# Patient Record
Sex: FEMALE | Race: WHITE | NOT HISPANIC OR LATINO | Employment: UNEMPLOYED | URBAN - METROPOLITAN AREA
[De-identification: names, ages, dates, MRNs, and addresses within clinical notes are randomized per-mention and may not be internally consistent; named-entity substitution may affect disease eponyms.]

---

## 2017-01-24 ENCOUNTER — OFFICE VISIT (OUTPATIENT)
Dept: ORTHOPEDIC SURGERY | Facility: CLINIC | Age: 42
End: 2017-01-24

## 2017-01-24 VITALS — HEIGHT: 64 IN | BODY MASS INDEX: 19.46 KG/M2 | WEIGHT: 114 LBS | TEMPERATURE: 97.6 F

## 2017-01-24 DIAGNOSIS — Z98.1 HISTORY OF LUMBAR FUSION: ICD-10-CM

## 2017-01-24 DIAGNOSIS — Z09 SURGERY FOLLOW-UP EXAMINATION: Primary | ICD-10-CM

## 2017-01-24 PROCEDURE — 99213 OFFICE O/P EST LOW 20 MIN: CPT | Performed by: ORTHOPAEDIC SURGERY

## 2017-01-24 PROCEDURE — 72100 X-RAY EXAM L-S SPINE 2/3 VWS: CPT | Performed by: ORTHOPAEDIC SURGERY

## 2017-01-24 NOTE — PROGRESS NOTES
She is 4 months out and has only occasional discomfort.  The pain is markedly improved from preoperatively.  No leg pain whatsoever no balance difficulties bowel or bladder complaints.  On exam she has good strength in lower extremities in all tested groups.  Straight leg raise is negative.  Plain film x-rays 2 views of lumbar spine demonstrate what would appear to be a solid posterolateral fusion which has shown further remodeling compared to prior films.  Well I will see her back needed

## 2017-07-21 ENCOUNTER — ANESTHESIA (OUTPATIENT)
Dept: PERIOP | Facility: HOSPITAL | Age: 42
End: 2017-07-21

## 2017-07-21 ENCOUNTER — HOSPITAL ENCOUNTER (OUTPATIENT)
Facility: HOSPITAL | Age: 42
Setting detail: OBSERVATION
Discharge: HOME | End: 2017-07-22
Attending: EMERGENCY MEDICINE | Admitting: SURGERY

## 2017-07-21 ENCOUNTER — ANESTHESIA EVENT (OUTPATIENT)
Dept: PERIOP | Facility: HOSPITAL | Age: 42
End: 2017-07-21

## 2017-07-21 ENCOUNTER — APPOINTMENT (OUTPATIENT)
Dept: CT IMAGING | Facility: HOSPITAL | Age: 42
End: 2017-07-21

## 2017-07-21 DIAGNOSIS — K35.30 ACUTE APPENDICITIS WITH LOCALIZED PERITONITIS: Primary | ICD-10-CM

## 2017-07-21 LAB
ALBUMIN SERPL-MCNC: 4.5 G/DL (ref 3.5–5.2)
ALBUMIN/GLOB SERPL: 1.4 G/DL
ALP SERPL-CCNC: 57 U/L (ref 39–117)
ALT SERPL W P-5'-P-CCNC: 11 U/L (ref 1–33)
ANION GAP SERPL CALCULATED.3IONS-SCNC: 18.7 MMOL/L
AST SERPL-CCNC: 10 U/L (ref 1–32)
BACTERIA UR QL AUTO: NORMAL /HPF
BASOPHILS # BLD AUTO: 0.02 10*3/MM3 (ref 0–0.2)
BASOPHILS NFR BLD AUTO: 0.2 % (ref 0–1.5)
BILIRUB SERPL-MCNC: 1 MG/DL (ref 0.1–1.2)
BILIRUB UR QL STRIP: NEGATIVE
BUN BLD-MCNC: 10 MG/DL (ref 6–20)
BUN/CREAT SERPL: 15.2 (ref 7–25)
CALCIUM SPEC-SCNC: 9 MG/DL (ref 8.6–10.5)
CHLORIDE SERPL-SCNC: 98 MMOL/L (ref 98–107)
CLARITY UR: CLEAR
CO2 SERPL-SCNC: 21.3 MMOL/L (ref 22–29)
COLOR UR: YELLOW
CREAT BLD-MCNC: 0.66 MG/DL (ref 0.57–1)
DEPRECATED RDW RBC AUTO: 46 FL (ref 37–54)
EOSINOPHIL # BLD AUTO: 0.02 10*3/MM3 (ref 0–0.7)
EOSINOPHIL NFR BLD AUTO: 0.2 % (ref 0.3–6.2)
ERYTHROCYTE [DISTWIDTH] IN BLOOD BY AUTOMATED COUNT: 12.8 % (ref 11.7–13)
GFR SERPL CREATININE-BSD FRML MDRD: 99 ML/MIN/1.73
GLOBULIN UR ELPH-MCNC: 3.2 GM/DL
GLUCOSE BLD-MCNC: 101 MG/DL (ref 65–99)
GLUCOSE UR STRIP-MCNC: NEGATIVE MG/DL
HCG SERPL QL: NEGATIVE
HCT VFR BLD AUTO: 45.2 % (ref 35.6–45.5)
HGB BLD-MCNC: 14.7 G/DL (ref 11.9–15.5)
HGB UR QL STRIP.AUTO: NEGATIVE
HYALINE CASTS UR QL AUTO: NORMAL /LPF
IMM GRANULOCYTES # BLD: 0.02 10*3/MM3 (ref 0–0.03)
IMM GRANULOCYTES NFR BLD: 0.2 % (ref 0–0.5)
KETONES UR QL STRIP: ABNORMAL
LEUKOCYTE ESTERASE UR QL STRIP.AUTO: ABNORMAL
LIPASE SERPL-CCNC: 28 U/L (ref 13–60)
LYMPHOCYTES # BLD AUTO: 1.5 10*3/MM3 (ref 0.9–4.8)
LYMPHOCYTES NFR BLD AUTO: 12.5 % (ref 19.6–45.3)
MCH RBC QN AUTO: 32 PG (ref 26.9–32)
MCHC RBC AUTO-ENTMCNC: 32.5 G/DL (ref 32.4–36.3)
MCV RBC AUTO: 98.5 FL (ref 80.5–98.2)
MONOCYTES # BLD AUTO: 0.88 10*3/MM3 (ref 0.2–1.2)
MONOCYTES NFR BLD AUTO: 7.3 % (ref 5–12)
NEUTROPHILS # BLD AUTO: 9.6 10*3/MM3 (ref 1.9–8.1)
NEUTROPHILS NFR BLD AUTO: 79.6 % (ref 42.7–76)
NITRITE UR QL STRIP: NEGATIVE
PH UR STRIP.AUTO: 8.5 [PH] (ref 5–8)
PLATELET # BLD AUTO: 191 10*3/MM3 (ref 140–500)
PMV BLD AUTO: 11.6 FL (ref 6–12)
POTASSIUM BLD-SCNC: 4.1 MMOL/L (ref 3.5–5.2)
PROT SERPL-MCNC: 7.7 G/DL (ref 6–8.5)
PROT UR QL STRIP: NEGATIVE
RBC # BLD AUTO: 4.59 10*6/MM3 (ref 3.9–5.2)
RBC # UR: NORMAL /HPF
REF LAB TEST METHOD: NORMAL
SODIUM BLD-SCNC: 138 MMOL/L (ref 136–145)
SP GR UR STRIP: 1.01 (ref 1–1.03)
SQUAMOUS #/AREA URNS HPF: NORMAL /HPF
UROBILINOGEN UR QL STRIP: ABNORMAL
WBC NRBC COR # BLD: 12.04 10*3/MM3 (ref 4.5–10.7)
WBC UR QL AUTO: NORMAL /HPF

## 2017-07-21 PROCEDURE — 74177 CT ABD & PELVIS W/CONTRAST: CPT

## 2017-07-21 PROCEDURE — 83690 ASSAY OF LIPASE: CPT | Performed by: EMERGENCY MEDICINE

## 2017-07-21 PROCEDURE — 25010000002 KETOROLAC TROMETHAMINE PER 15 MG: Performed by: ANESTHESIOLOGY

## 2017-07-21 PROCEDURE — G0378 HOSPITAL OBSERVATION PER HR: HCPCS

## 2017-07-21 PROCEDURE — 96365 THER/PROPH/DIAG IV INF INIT: CPT

## 2017-07-21 PROCEDURE — 84703 CHORIONIC GONADOTROPIN ASSAY: CPT | Performed by: EMERGENCY MEDICINE

## 2017-07-21 PROCEDURE — 0 IOPAMIDOL 61 % SOLUTION: Performed by: EMERGENCY MEDICINE

## 2017-07-21 PROCEDURE — 25010000002 ONDANSETRON PER 1 MG: Performed by: EMERGENCY MEDICINE

## 2017-07-21 PROCEDURE — 99283 EMERGENCY DEPT VISIT LOW MDM: CPT

## 2017-07-21 PROCEDURE — 81001 URINALYSIS AUTO W/SCOPE: CPT | Performed by: EMERGENCY MEDICINE

## 2017-07-21 PROCEDURE — 96375 TX/PRO/DX INJ NEW DRUG ADDON: CPT

## 2017-07-21 PROCEDURE — 85025 COMPLETE CBC W/AUTO DIFF WBC: CPT | Performed by: EMERGENCY MEDICINE

## 2017-07-21 PROCEDURE — 25010000002 ONDANSETRON PER 1 MG: Performed by: ANESTHESIOLOGY

## 2017-07-21 PROCEDURE — 25010000002 PROPOFOL 10 MG/ML EMULSION: Performed by: ANESTHESIOLOGY

## 2017-07-21 PROCEDURE — 99219 PR INITIAL OBSERVATION CARE/DAY 50 MINUTES: CPT | Performed by: SURGERY

## 2017-07-21 PROCEDURE — 25010000002 MIDAZOLAM PER 1 MG: Performed by: ANESTHESIOLOGY

## 2017-07-21 PROCEDURE — 80053 COMPREHEN METABOLIC PANEL: CPT | Performed by: EMERGENCY MEDICINE

## 2017-07-21 PROCEDURE — 25010000002 HYDROMORPHONE PER 4 MG: Performed by: EMERGENCY MEDICINE

## 2017-07-21 PROCEDURE — 25010000002 PROMETHAZINE PER 50 MG: Performed by: ANESTHESIOLOGY

## 2017-07-21 PROCEDURE — 88304 TISSUE EXAM BY PATHOLOGIST: CPT | Performed by: SURGERY

## 2017-07-21 PROCEDURE — 25010000002 FENTANYL CITRATE (PF) 100 MCG/2ML SOLUTION: Performed by: ANESTHESIOLOGY

## 2017-07-21 PROCEDURE — 25010000002 PIPERACILLIN SOD-TAZOBACTAM PER 1 G: Performed by: SURGERY

## 2017-07-21 PROCEDURE — 25010000002 DEXAMETHASONE PER 1 MG: Performed by: ANESTHESIOLOGY

## 2017-07-21 PROCEDURE — 44970 LAPAROSCOPY APPENDECTOMY: CPT | Performed by: PHYSICIAN ASSISTANT

## 2017-07-21 PROCEDURE — 44970 LAPAROSCOPY APPENDECTOMY: CPT | Performed by: SURGERY

## 2017-07-21 PROCEDURE — 25010000002 PIPERACILLIN SOD-TAZOBACTAM PER 1 G: Performed by: EMERGENCY MEDICINE

## 2017-07-21 RX ORDER — HYDROMORPHONE HYDROCHLORIDE 1 MG/ML
0.5 INJECTION, SOLUTION INTRAMUSCULAR; INTRAVENOUS; SUBCUTANEOUS
Status: DISCONTINUED | OUTPATIENT
Start: 2017-07-21 | End: 2017-07-21

## 2017-07-21 RX ORDER — VIT C/B6/B5/MAGNESIUM/HERB 173 50-5-6-5MG
1 CAPSULE ORAL DAILY
COMMUNITY
End: 2022-07-22

## 2017-07-21 RX ORDER — DIPHENHYDRAMINE HYDROCHLORIDE 50 MG/ML
6.25 INJECTION INTRAMUSCULAR; INTRAVENOUS
Status: DISCONTINUED | OUTPATIENT
Start: 2017-07-21 | End: 2017-07-21

## 2017-07-21 RX ORDER — PROMETHAZINE HYDROCHLORIDE 25 MG/1
25 SUPPOSITORY RECTAL ONCE AS NEEDED
Status: DISCONTINUED | OUTPATIENT
Start: 2017-07-21 | End: 2017-07-21

## 2017-07-21 RX ORDER — BUPIVACAINE HYDROCHLORIDE AND EPINEPHRINE 5; 5 MG/ML; UG/ML
INJECTION, SOLUTION EPIDURAL; INTRACAUDAL; PERINEURAL AS NEEDED
Status: DISCONTINUED | OUTPATIENT
Start: 2017-07-21 | End: 2017-07-21 | Stop reason: HOSPADM

## 2017-07-21 RX ORDER — PROMETHAZINE HYDROCHLORIDE 25 MG/1
25 TABLET ORAL ONCE AS NEEDED
Status: DISCONTINUED | OUTPATIENT
Start: 2017-07-21 | End: 2017-07-21

## 2017-07-21 RX ORDER — FENTANYL CITRATE 50 UG/ML
100 INJECTION, SOLUTION INTRAMUSCULAR; INTRAVENOUS
Status: DISCONTINUED | OUTPATIENT
Start: 2017-07-21 | End: 2017-07-21 | Stop reason: HOSPADM

## 2017-07-21 RX ORDER — NALOXONE HCL 0.4 MG/ML
0.4 VIAL (ML) INJECTION AS NEEDED
Status: DISCONTINUED | OUTPATIENT
Start: 2017-07-21 | End: 2017-07-21

## 2017-07-21 RX ORDER — HYDROMORPHONE HYDROCHLORIDE 1 MG/ML
0.5 INJECTION, SOLUTION INTRAMUSCULAR; INTRAVENOUS; SUBCUTANEOUS ONCE
Status: COMPLETED | OUTPATIENT
Start: 2017-07-21 | End: 2017-07-21

## 2017-07-21 RX ORDER — MIDAZOLAM HYDROCHLORIDE 1 MG/ML
1 INJECTION INTRAMUSCULAR; INTRAVENOUS
Status: DISCONTINUED | OUTPATIENT
Start: 2017-07-21 | End: 2017-07-21 | Stop reason: HOSPADM

## 2017-07-21 RX ORDER — MIDAZOLAM HYDROCHLORIDE 1 MG/ML
2 INJECTION INTRAMUSCULAR; INTRAVENOUS
Status: DISCONTINUED | OUTPATIENT
Start: 2017-07-21 | End: 2017-07-21 | Stop reason: HOSPADM

## 2017-07-21 RX ORDER — SODIUM CHLORIDE 0.9 % (FLUSH) 0.9 %
1-10 SYRINGE (ML) INJECTION AS NEEDED
Status: DISCONTINUED | OUTPATIENT
Start: 2017-07-21 | End: 2017-07-21 | Stop reason: HOSPADM

## 2017-07-21 RX ORDER — ACETAMINOPHEN 500 MG
500 TABLET ORAL EVERY 6 HOURS PRN
Status: DISCONTINUED | OUTPATIENT
Start: 2017-07-21 | End: 2017-07-22 | Stop reason: HOSPADM

## 2017-07-21 RX ORDER — FENTANYL CITRATE 50 UG/ML
25 INJECTION, SOLUTION INTRAMUSCULAR; INTRAVENOUS
Status: DISCONTINUED | OUTPATIENT
Start: 2017-07-21 | End: 2017-07-21

## 2017-07-21 RX ORDER — ROCURONIUM BROMIDE 10 MG/ML
INJECTION, SOLUTION INTRAVENOUS AS NEEDED
Status: DISCONTINUED | OUTPATIENT
Start: 2017-07-21 | End: 2017-07-21 | Stop reason: SURG

## 2017-07-21 RX ORDER — SODIUM CHLORIDE, SODIUM LACTATE, POTASSIUM CHLORIDE, CALCIUM CHLORIDE 600; 310; 30; 20 MG/100ML; MG/100ML; MG/100ML; MG/100ML
9 INJECTION, SOLUTION INTRAVENOUS CONTINUOUS
Status: DISCONTINUED | OUTPATIENT
Start: 2017-07-21 | End: 2017-07-21

## 2017-07-21 RX ORDER — NALOXONE HCL 0.4 MG/ML
0.4 VIAL (ML) INJECTION
Status: DISCONTINUED | OUTPATIENT
Start: 2017-07-21 | End: 2017-07-22

## 2017-07-21 RX ORDER — ONDANSETRON 2 MG/ML
INJECTION INTRAMUSCULAR; INTRAVENOUS AS NEEDED
Status: DISCONTINUED | OUTPATIENT
Start: 2017-07-21 | End: 2017-07-21 | Stop reason: SURG

## 2017-07-21 RX ORDER — ONDANSETRON 2 MG/ML
4 INJECTION INTRAMUSCULAR; INTRAVENOUS ONCE
Status: COMPLETED | OUTPATIENT
Start: 2017-07-21 | End: 2017-07-21

## 2017-07-21 RX ORDER — LEVOTHYROXINE SODIUM 0.1 MG/1
100 TABLET ORAL DAILY
Status: DISCONTINUED | OUTPATIENT
Start: 2017-07-21 | End: 2017-07-22 | Stop reason: HOSPADM

## 2017-07-21 RX ORDER — PROMETHAZINE HYDROCHLORIDE 12.5 MG/1
12.5 TABLET ORAL ONCE AS NEEDED
Status: DISCONTINUED | OUTPATIENT
Start: 2017-07-21 | End: 2017-07-21

## 2017-07-21 RX ORDER — DEXTROSE, SODIUM CHLORIDE, AND POTASSIUM CHLORIDE 5; .45; .15 G/100ML; G/100ML; G/100ML
50 INJECTION INTRAVENOUS CONTINUOUS
Status: DISCONTINUED | OUTPATIENT
Start: 2017-07-21 | End: 2017-07-22

## 2017-07-21 RX ORDER — PROMETHAZINE HYDROCHLORIDE 25 MG/ML
6.25 INJECTION, SOLUTION INTRAMUSCULAR; INTRAVENOUS ONCE AS NEEDED
Status: DISCONTINUED | OUTPATIENT
Start: 2017-07-21 | End: 2017-07-21

## 2017-07-21 RX ORDER — OXYCODONE HYDROCHLORIDE AND ACETAMINOPHEN 5; 325 MG/1; MG/1
1 TABLET ORAL ONCE AS NEEDED
Status: DISCONTINUED | OUTPATIENT
Start: 2017-07-21 | End: 2017-07-21

## 2017-07-21 RX ORDER — DEXAMETHASONE SODIUM PHOSPHATE 10 MG/ML
INJECTION INTRAMUSCULAR; INTRAVENOUS AS NEEDED
Status: DISCONTINUED | OUTPATIENT
Start: 2017-07-21 | End: 2017-07-21 | Stop reason: SURG

## 2017-07-21 RX ORDER — HYDRALAZINE HYDROCHLORIDE 20 MG/ML
5 INJECTION INTRAMUSCULAR; INTRAVENOUS
Status: DISCONTINUED | OUTPATIENT
Start: 2017-07-21 | End: 2017-07-21

## 2017-07-21 RX ORDER — LIDOCAINE HYDROCHLORIDE 10 MG/ML
0.5 INJECTION, SOLUTION EPIDURAL; INFILTRATION; INTRACAUDAL; PERINEURAL ONCE AS NEEDED
Status: DISCONTINUED | OUTPATIENT
Start: 2017-07-21 | End: 2017-07-21 | Stop reason: HOSPADM

## 2017-07-21 RX ORDER — LIDOCAINE HYDROCHLORIDE 20 MG/ML
INJECTION, SOLUTION INFILTRATION; PERINEURAL AS NEEDED
Status: DISCONTINUED | OUTPATIENT
Start: 2017-07-21 | End: 2017-07-21 | Stop reason: SURG

## 2017-07-21 RX ORDER — LABETALOL HYDROCHLORIDE 5 MG/ML
5 INJECTION, SOLUTION INTRAVENOUS
Status: DISCONTINUED | OUTPATIENT
Start: 2017-07-21 | End: 2017-07-21

## 2017-07-21 RX ORDER — MELATONIN
1000 DAILY
Status: DISCONTINUED | OUTPATIENT
Start: 2017-07-21 | End: 2017-07-22 | Stop reason: HOSPADM

## 2017-07-21 RX ORDER — HYDROMORPHONE HYDROCHLORIDE 1 MG/ML
0.5 INJECTION, SOLUTION INTRAMUSCULAR; INTRAVENOUS; SUBCUTANEOUS
Status: DISCONTINUED | OUTPATIENT
Start: 2017-07-21 | End: 2017-07-22

## 2017-07-21 RX ORDER — HYDROMORPHONE HYDROCHLORIDE 1 MG/ML
0.25 INJECTION, SOLUTION INTRAMUSCULAR; INTRAVENOUS; SUBCUTANEOUS
Status: DISCONTINUED | OUTPATIENT
Start: 2017-07-21 | End: 2017-07-21

## 2017-07-21 RX ORDER — PROMETHAZINE HYDROCHLORIDE 25 MG/ML
12.5 INJECTION, SOLUTION INTRAMUSCULAR; INTRAVENOUS ONCE AS NEEDED
Status: DISCONTINUED | OUTPATIENT
Start: 2017-07-21 | End: 2017-07-21

## 2017-07-21 RX ORDER — FAMOTIDINE 10 MG/ML
20 INJECTION, SOLUTION INTRAVENOUS ONCE
Status: COMPLETED | OUTPATIENT
Start: 2017-07-21 | End: 2017-07-21

## 2017-07-21 RX ORDER — OXYCODONE HYDROCHLORIDE AND ACETAMINOPHEN 5; 325 MG/1; MG/1
1 TABLET ORAL EVERY 4 HOURS PRN
Status: DISCONTINUED | OUTPATIENT
Start: 2017-07-21 | End: 2017-07-22 | Stop reason: HOSPADM

## 2017-07-21 RX ORDER — PROPOFOL 10 MG/ML
VIAL (ML) INTRAVENOUS AS NEEDED
Status: DISCONTINUED | OUTPATIENT
Start: 2017-07-21 | End: 2017-07-21 | Stop reason: SURG

## 2017-07-21 RX ORDER — CITALOPRAM 20 MG/1
20 TABLET ORAL EVERY MORNING
Status: DISCONTINUED | OUTPATIENT
Start: 2017-07-22 | End: 2017-07-22 | Stop reason: HOSPADM

## 2017-07-21 RX ORDER — PROMETHAZINE HYDROCHLORIDE 25 MG/ML
12.5 INJECTION, SOLUTION INTRAMUSCULAR; INTRAVENOUS EVERY 6 HOURS PRN
Status: DISCONTINUED | OUTPATIENT
Start: 2017-07-21 | End: 2017-07-22 | Stop reason: HOSPADM

## 2017-07-21 RX ORDER — PROMETHAZINE HYDROCHLORIDE 25 MG/ML
6.25 INJECTION, SOLUTION INTRAMUSCULAR; INTRAVENOUS ONCE
Status: COMPLETED | OUTPATIENT
Start: 2017-07-21 | End: 2017-07-21

## 2017-07-21 RX ORDER — EPHEDRINE SULFATE 50 MG/ML
5 INJECTION, SOLUTION INTRAVENOUS ONCE AS NEEDED
Status: DISCONTINUED | OUTPATIENT
Start: 2017-07-21 | End: 2017-07-21

## 2017-07-21 RX ORDER — CETIRIZINE HYDROCHLORIDE 10 MG/1
10 TABLET ORAL EVERY MORNING
Status: DISCONTINUED | OUTPATIENT
Start: 2017-07-22 | End: 2017-07-22 | Stop reason: HOSPADM

## 2017-07-21 RX ORDER — MAGNESIUM HYDROXIDE 1200 MG/15ML
LIQUID ORAL AS NEEDED
Status: DISCONTINUED | OUTPATIENT
Start: 2017-07-21 | End: 2017-07-21 | Stop reason: HOSPADM

## 2017-07-21 RX ORDER — ONDANSETRON 2 MG/ML
4 INJECTION INTRAMUSCULAR; INTRAVENOUS ONCE AS NEEDED
Status: DISCONTINUED | OUTPATIENT
Start: 2017-07-21 | End: 2017-07-21

## 2017-07-21 RX ORDER — ONDANSETRON 2 MG/ML
4 INJECTION INTRAMUSCULAR; INTRAVENOUS EVERY 6 HOURS PRN
Status: DISCONTINUED | OUTPATIENT
Start: 2017-07-21 | End: 2017-07-22

## 2017-07-21 RX ORDER — SCOLOPAMINE TRANSDERMAL SYSTEM 1 MG/1
1 PATCH, EXTENDED RELEASE TRANSDERMAL ONCE
Status: DISCONTINUED | OUTPATIENT
Start: 2017-07-21 | End: 2017-07-21

## 2017-07-21 RX ORDER — IBUPROFEN 200 MG
1250 CAPSULE ORAL DAILY
Status: DISCONTINUED | OUTPATIENT
Start: 2017-07-21 | End: 2017-07-22 | Stop reason: HOSPADM

## 2017-07-21 RX ORDER — FLUMAZENIL 0.1 MG/ML
0.2 INJECTION INTRAVENOUS AS NEEDED
Status: DISCONTINUED | OUTPATIENT
Start: 2017-07-21 | End: 2017-07-21

## 2017-07-21 RX ORDER — SODIUM CHLORIDE 0.9 % (FLUSH) 0.9 %
1-10 SYRINGE (ML) INJECTION AS NEEDED
Status: DISCONTINUED | OUTPATIENT
Start: 2017-07-21 | End: 2017-07-22 | Stop reason: HOSPADM

## 2017-07-21 RX ORDER — KETOROLAC TROMETHAMINE 30 MG/ML
INJECTION, SOLUTION INTRAMUSCULAR; INTRAVENOUS AS NEEDED
Status: DISCONTINUED | OUTPATIENT
Start: 2017-07-21 | End: 2017-07-21 | Stop reason: SURG

## 2017-07-21 RX ORDER — FENTANYL CITRATE 50 UG/ML
INJECTION, SOLUTION INTRAMUSCULAR; INTRAVENOUS AS NEEDED
Status: DISCONTINUED | OUTPATIENT
Start: 2017-07-21 | End: 2017-07-21 | Stop reason: SURG

## 2017-07-21 RX ADMIN — SCOPOLAMINE 1 PATCH: 1 PATCH, EXTENDED RELEASE TRANSDERMAL at 16:32

## 2017-07-21 RX ADMIN — SODIUM CHLORIDE 1000 ML: 9 INJECTION, SOLUTION INTRAVENOUS at 13:03

## 2017-07-21 RX ADMIN — ONDANSETRON 4 MG: 2 INJECTION INTRAMUSCULAR; INTRAVENOUS at 13:02

## 2017-07-21 RX ADMIN — HYDROMORPHONE HYDROCHLORIDE 0.5 MG: 1 INJECTION, SOLUTION INTRAMUSCULAR; INTRAVENOUS; SUBCUTANEOUS at 13:02

## 2017-07-21 RX ADMIN — ONDANSETRON 4 MG: 2 INJECTION INTRAMUSCULAR; INTRAVENOUS at 17:27

## 2017-07-21 RX ADMIN — PROPOFOL 140 MG: 10 INJECTION, EMULSION INTRAVENOUS at 16:43

## 2017-07-21 RX ADMIN — SODIUM CHLORIDE, POTASSIUM CHLORIDE, SODIUM LACTATE AND CALCIUM CHLORIDE 9 ML/HR: 600; 310; 30; 20 INJECTION, SOLUTION INTRAVENOUS at 16:31

## 2017-07-21 RX ADMIN — LIDOCAINE HYDROCHLORIDE 50 MG: 20 INJECTION, SOLUTION INFILTRATION; PERINEURAL at 16:43

## 2017-07-21 RX ADMIN — KETOROLAC TROMETHAMINE 30 MG: 30 INJECTION, SOLUTION INTRAMUSCULAR; INTRAVENOUS at 17:31

## 2017-07-21 RX ADMIN — FENTANYL CITRATE 25 MCG: 50 INJECTION, SOLUTION INTRAMUSCULAR; INTRAVENOUS at 17:30

## 2017-07-21 RX ADMIN — FENTANYL CITRATE 50 MCG: 50 INJECTION, SOLUTION INTRAMUSCULAR; INTRAVENOUS at 17:34

## 2017-07-21 RX ADMIN — ROCURONIUM BROMIDE 10 MG: 10 INJECTION INTRAVENOUS at 17:16

## 2017-07-21 RX ADMIN — ROCURONIUM BROMIDE 40 MG: 10 INJECTION INTRAVENOUS at 16:43

## 2017-07-21 RX ADMIN — IOPAMIDOL 85 ML: 612 INJECTION, SOLUTION INTRAVENOUS at 14:04

## 2017-07-21 RX ADMIN — TAZOBACTAM SODIUM AND PIPERACILLIN SODIUM 3.38 G: 375; 3 INJECTION, SOLUTION INTRAVENOUS at 21:36

## 2017-07-21 RX ADMIN — SODIUM CHLORIDE, POTASSIUM CHLORIDE, SODIUM LACTATE AND CALCIUM CHLORIDE 9 ML/HR: 600; 310; 30; 20 INJECTION, SOLUTION INTRAVENOUS at 18:12

## 2017-07-21 RX ADMIN — PROMETHAZINE HYDROCHLORIDE 6.25 MG: 25 INJECTION, SOLUTION INTRAMUSCULAR; INTRAVENOUS at 16:31

## 2017-07-21 RX ADMIN — TAZOBACTAM SODIUM AND PIPERACILLIN SODIUM 4.5 G: 500; 4 INJECTION, SOLUTION INTRAVENOUS at 14:51

## 2017-07-21 RX ADMIN — DEXAMETHASONE SODIUM PHOSPHATE 6 MG: 10 INJECTION INTRAMUSCULAR; INTRAVENOUS at 16:46

## 2017-07-21 RX ADMIN — SUGAMMADEX 500 MG: 100 INJECTION, SOLUTION INTRAVENOUS at 17:28

## 2017-07-21 RX ADMIN — FENTANYL CITRATE 50 MCG: 50 INJECTION, SOLUTION INTRAMUSCULAR; INTRAVENOUS at 17:26

## 2017-07-21 RX ADMIN — FENTANYL CITRATE 50 MCG: 50 INJECTION, SOLUTION INTRAMUSCULAR; INTRAVENOUS at 16:57

## 2017-07-21 RX ADMIN — FAMOTIDINE 20 MG: 10 INJECTION INTRAVENOUS at 16:31

## 2017-07-21 RX ADMIN — FENTANYL CITRATE 75 MCG: 50 INJECTION, SOLUTION INTRAMUSCULAR; INTRAVENOUS at 16:42

## 2017-07-21 RX ADMIN — MIDAZOLAM 1 MG: 1 INJECTION INTRAMUSCULAR; INTRAVENOUS at 16:32

## 2017-07-21 RX ADMIN — OXYCODONE HYDROCHLORIDE AND ACETAMINOPHEN 1 TABLET: 5; 325 TABLET ORAL at 21:38

## 2017-07-21 NOTE — PLAN OF CARE
Problem: Patient Care Overview (Adult)  Goal: Plan of Care Review  Outcome: Ongoing (interventions implemented as appropriate)    07/21/17 1610   Coping/Psychosocial Response Interventions   Plan Of Care Reviewed With patient   Patient Care Overview   Progress no change       Goal: Adult Individualization and Mutuality  Outcome: Ongoing (interventions implemented as appropriate)  Goal: Discharge Needs Assessment  Outcome: Ongoing (interventions implemented as appropriate)    Problem: Perioperative Period (Adult)  Goal: Signs and Symptoms of Listed Potential Problems Will be Absent or Manageable (Perioperative Period)  Outcome: Ongoing (interventions implemented as appropriate)

## 2017-07-21 NOTE — OP NOTE
Operative Note :  MD Yohannes Goinsede GUTHRIE Frederick  1975    Procedure Date: 07/21/17    Pre-op Diagnosis:  · Acute appendicitis    Post-op Diagnosis:  · Acute appendicitis with suppurative peritonitis    Procedure:   · Laparoscopic appendectomy    Surgeon: Leticia Elizabeth MD    Assistant: Charlie LYNCH    Anesthesia:  General (general endotracheal tube)    Estimated Blood Loss: Minimal    Specimens: Appendix    Complications: None    Indications:  · Acute appendicitis without perforation with surrounding suppurative peritonitis    Findings:   · Acutely inflamed appendix with multiple intraluminal fecaliths, purulent fluid surrounding the appendix within the pelvis with no other signs of perforation    Description of procedure:  The patient was brought to the operating room and placed on the OR table in supine position.  An endotracheal tube was inserted, and general anesthesia was induced.  A Martin catheter was placed into the urinary bladder using sterile technique.  Her left arm was tucked at her side and her abdomen was then prepped and draped in a sterile fashion.  A surgical timeout was completed.  A curvilinear infraumbilical skin incision was made after instillation of 0.5% Marcaine with epinephrine.  The umbilical stalk was grasped and elevated, allowing a longitudinal fasciotomy to be made.  A finger sweep was performed within the peritoneal cavity, ensuring that there were no underlying adherent loops of bowel.  A 10 mm Linton trocar was then inserted using 0 Vicryl stay sutures on either side of the fascial defect.  The abdomen was then insufflated.  The right lower quadrant was inspected and there was purulent fluid surrounding a grossly inflamed appendix stretching towards the lower pelvis.  Under direct visualization, 2 additional 5 mm trochars were then placed in the suprapubic and left lower quadrant spaces.  The appendix was grasped and a small window was created within the  mesentery at the appendiceal base.  The base of the appendix was then transected at the confluence of the tinea along the cecum using a single blue load of the laparoscopic stapler.  The appendix was then peeled off of the surrounding peritoneal lining using blunt dissection and electrocautery until the appendiceal mesentery was isolated.  This was transected using a single white load of the laparoscopic stapler.  The appendix was then removed from the peritoneal cavity within an Endo Catch bag and passed off to pathology in formalin.  The pelvis was then inspected and the purulent fluid mixed with some new blood was suctioned dry.  The right upper quadrant and perihepatic space was also inspected and some purulent fluid pooling here was suctioned dry.  The 2 staple lines were inspected and found to be hemostatic within the right lower quadrant.  The small bowel was bluntly swept away from the pelvis, ensuring that there was no inflammatory adhesion forming as a result of the appendicitis within the pelvic cavity.  The uterus was retroflexed anteriorly and the remaining purulent fluid within the perirectal space was suctioned dry.  The abdomen was then desufflated and all trochars removed under direct vision and the fascia was closed at the umbilicus using the previously placed 0 Vicryl stay sutures and all skin incisions were closed using 4-0 Vicryl subcuticular stitches.  Dermabond was dressed over each incision.  The Martin catheter was then removed and the patient extubated.  She was transferred to PACU in stable condition.  All counts were correct per nursing.    Leticia Elizabeth MD  General and Endoscopic Surgery  [facility]    [address], [address]  [place], [place], [address]  P: [phone]  F: [phone]

## 2017-07-21 NOTE — ANESTHESIA PROCEDURE NOTES
Airway  Urgency: elective    Airway not difficult    General Information and Staff    Patient location during procedure: OR  Anesthesiologist: CARMEN NGUYEN    Indications and Patient Condition  Indications for airway management: airway protection    Preoxygenated: yes  Mask difficulty assessment: 1 - vent by mask    Final Airway Details  Final airway type: endotracheal airway      Successful airway: ETT  Cuffed: yes   Successful intubation technique: direct laryngoscopy  Endotracheal tube insertion site: oral  Blade: Evon  Blade size: #3  ETT size: 7.0 mm  Cormack-Lehane Classification: grade I - full view of glottis  Placement verified by: chest auscultation and capnometry   Measured from: lips  ETT to lips (cm): 22  Number of attempts at approach: 1

## 2017-07-21 NOTE — ED PROVIDER NOTES
" EMERGENCY DEPARTMENT ENCOUNTER    CHIEF COMPLAINT  Chief Complaint: abdominal pain  History given by: pt  History limited by: nothing  Room Number: 22/22  PMD: Lisa Felix       HPI:  Pt is a 42 y.o. female who presents complaining of worsening generalized abdominal pain since yesterday morning. She reports her next period is set to start soon, but the symptoms are atypical. Pt admits to nausea and tingling in the legs but denies any urinary symptoms, vomiting, or diarrhea. She states she went to urgent care and was sent to the ER from there.      Duration:  One day  Onset: gradual  Timing: constant  Location: abdomen  Radiation: none  Quality: \"Pain\"  Intensity/Severity: moderate  Progression: worsening  Associated Symptoms: nausea, tingling in the legs  Aggravating Factors: unknown  Alleviating Factors: unknown  Previous Episodes: No  Treatment before arrival: unknown    PAST MEDICAL HISTORY  Active Ambulatory Problems     Diagnosis Date Noted   • Surgery follow-up examination 10/26/2016     Resolved Ambulatory Problems     Diagnosis Date Noted   • Lumbar radiculopathy 08/16/2016   • Lumbar canal stenosis 08/16/2016   • Spondylolisthesis of lumbar region 08/16/2016     Past Medical History:   Diagnosis Date   • Anxiety    • Asthma    • Back pain    • Disease of thyroid gland    • Low blood pressure    • Migraine    • Pituitary adenoma    • PONV (postoperative nausea and vomiting)    • Spinal stenosis        PAST SURGICAL HISTORY  Past Surgical History:   Procedure Laterality Date   • ABDOMINAL SURGERY      abdominal plasty   • BREAST AUGMENTATION     • DENTAL EXAMINATION UNDER ANESTHESIA     • LUMBAR DISCECTOMY FUSION INSTRUMENTATION N/A 9/26/2016    Procedure: L4-5  fusion with instrumentation;  Surgeon: Isidro Dietz MD;  Location: [facility];  Service:    • LUMBAR LAMINECTOMY DISCECTOMY DECOMPRESSION N/A 9/26/2016    Procedure: L4-L5 LUMBAR LAMINECTOMY;  Surgeon: Darrell Olvera MD;  Location: " [facility] MAIN OR;  Service:    • SCAR REVISION BREAST Bilateral 4/29/2016    Procedure: REVISION GABBY BREAST, REMOVE AND REPLACE RIGHT BREAST IMPLANT;  Surgeon: Isidro Nguyen MD;  Location: [facility] MAIN OR;  Service:    • WISDOM TOOTH EXTRACTION         FAMILY HISTORY  Family History   Problem Relation Age of Onset   • Bipolar disorder Other    • Breast cancer Other    • Heart disease Other    • Diabetes Other    • Hypothyroidism Other        SOCIAL HISTORY  Social History     Social History   • Marital status:      Spouse name: N/A   • Number of children: N/A   • Years of education: Bachelors     Occupational History   • Homemaker      Social History Main Topics   • Smoking status: Never Smoker   • Smokeless tobacco: Never Used   • Alcohol use 4.2 oz/week     7 Glasses of wine per week      Comment: occasional   • Drug use: No   • Sexual activity: Defer     Other Topics Concern   • Not on file     Social History Narrative       ALLERGIES  Review of patient's allergies indicates no known allergies.    REVIEW OF SYSTEMS  Review of Systems   Constitutional: Negative for fever.   HENT: Negative for sore throat.    Eyes: Negative.    Respiratory: Negative for cough and shortness of breath.    Cardiovascular: Negative for chest pain.   Gastrointestinal: Positive for abdominal pain (generalized) and nausea. Negative for diarrhea and vomiting.   Genitourinary: Negative for dysuria.   Musculoskeletal: Negative for neck pain.   Skin: Negative for rash.   Allergic/Immunologic: Negative.    Neurological: Positive for numbness (tingling in legs). Negative for weakness and headaches.   Hematological: Negative.    Psychiatric/Behavioral: Negative.    All other systems reviewed and are negative.      PHYSICAL EXAM  ED Triage Vitals   Temp Heart Rate Resp BP SpO2   07/21/17 1152 07/21/17 1152 07/21/17 1152 07/21/17 1157 07/21/17 1152   99.6 °F (37.6 °C) 75 16 115/66 97 %      Temp src Heart Rate Source Patient Position BP  Location FiO2 (%)   07/21/17 1152 07/21/17 1152 -- -- --   Tympanic Monitor          Physical Exam   Constitutional: She is oriented to person, place, and time and well-developed, well-nourished, and in no distress. No distress.   HENT:   Head: Normocephalic and atraumatic.   Eyes: EOM are normal. Pupils are equal, round, and reactive to light.   Neck: Normal range of motion. Neck supple.   Cardiovascular: Normal rate, regular rhythm and normal heart sounds.    Pulmonary/Chest: Effort normal and breath sounds normal. No respiratory distress.   Abdominal: Soft. There is tenderness (moderate to severe) in the right lower quadrant and left lower quadrant. There is no rebound and no guarding.   Left sided tenderness reproduces right sided pain   Musculoskeletal: Normal range of motion. She exhibits no edema.   Neurological: She is alert and oriented to person, place, and time. She has normal sensation and normal strength.   Skin: Skin is warm and dry. No rash noted.   Psychiatric: Mood and affect normal.   Nursing note and vitals reviewed.      LAB RESULTS  Lab Results (last 24 hours)     Procedure Component Value Units Date/Time    CBC & Differential [[id number]] Collected:  07/21/17 1301    Specimen:  Blood Updated:  07/21/17 1735    Narrative:       The following orders were created for panel order CBC & Differential.  Procedure                               Abnormality         Status                     ---------                               -----------         ------                     CBC Auto Differential[[id number]]        Abnormal            Final result                 Please view results for these tests on the individual orders.    Comprehensive Metabolic Panel [[id number]]  (Abnormal) Collected:  07/21/17 1301    Specimen:  Blood Updated:  07/21/17 1343     Glucose 101 (H) mg/dL      BUN 10 mg/dL      Creatinine 0.66 mg/dL      Sodium 138 mmol/L      Potassium 4.1 mmol/L      Chloride 98 mmol/L      CO2 21.3  (L) mmol/L      Calcium 9.0 mg/dL      Total Protein 7.7 g/dL      Albumin 4.50 g/dL      ALT (SGPT) 11 U/L      AST (SGOT) 10 U/L      Alkaline Phosphatase 57 U/L      Total Bilirubin 1.0 mg/dL      eGFR Non African Amer 99 mL/min/1.73      Globulin 3.2 gm/dL      A/G Ratio 1.4 g/dL      BUN/Creatinine Ratio 15.2     Anion Gap 18.7 mmol/L     hCG, Serum, Qualitative [[id number]]  (Normal) Collected:  07/21/17 1301    Specimen:  Blood Updated:  07/21/17 1345     HCG Qualitative Negative    Urinalysis With / Culture If Indicated [[id number]]  (Abnormal) Collected:  07/21/17 1301    Specimen:  Urine from Urine, Clean Catch Updated:  07/21/17 1332     Color, UA Yellow     Appearance, UA Clear     pH, UA 8.5 (H)     Specific Gravity, UA 1.012     Glucose, UA Negative     Ketones, UA 40 mg/dL (2+) (A)     Bilirubin, UA Negative     Blood, UA Negative     Protein, UA Negative     Leuk Esterase, UA Small (1+) (A)     Nitrite, UA Negative     Urobilinogen, UA 0.2 E.U./dL    Lipase [[id number]]  (Normal) Collected:  07/21/17 1301    Specimen:  Blood Updated:  07/21/17 1343     Lipase 28 U/L     CBC Auto Differential [[id number]]  (Abnormal) Collected:  07/21/17 1301    Specimen:  Blood Updated:  07/21/17 1335     WBC 12.04 (H) 10*3/mm3      RBC 4.59 10*6/mm3      Hemoglobin 14.7 g/dL      Hematocrit 45.2 %      MCV 98.5 (H) fL      MCH 32.0 pg      MCHC 32.5 g/dL      RDW 12.8 %      RDW-SD 46.0 fl      MPV 11.6 fL      Platelets 191 10*3/mm3      Neutrophil % 79.6 (H) %      Lymphocyte % 12.5 (L) %      Monocyte % 7.3 %      Eosinophil % 0.2 (L) %      Basophil % 0.2 %      Immature Grans % 0.2 %      Neutrophils, Absolute 9.60 (H) 10*3/mm3      Lymphocytes, Absolute 1.50 10*3/mm3      Monocytes, Absolute 0.88 10*3/mm3      Eosinophils, Absolute 0.02 10*3/mm3      Basophils, Absolute 0.02 10*3/mm3      Immature Grans, Absolute 0.02 10*3/mm3     Urinalysis, Microscopic Only [[id number]] Collected:  07/21/17 1301    Specimen:   Urine from Urine, Clean Catch Updated:  07/21/17 1430     RBC, UA 0-2 /HPF      WBC, UA 0-2 /HPF      Bacteria, UA None Seen /HPF      Squamous Epithelial Cells, UA 0-2 /HPF      Hyaline Casts, UA None Seen /LPF      Methodology Automated Microscopy          I ordered the above labs and reviewed the results    RADIOLOGY  CT Abdomen Pelvis With Contrast   Preliminary Result   Findings are consistent with acute appendicitis.  Mesenteric   fluid also layering within the pelvis and in the perihepatic space is   likely reactive. No extraluminal air or fluid collection is present.       These findings were discussed with Dr. Montanez by telephone at 2:40 PM   on 07/21/2017.               I ordered the above noted radiological studies. Interpreted by radiologist. Discussed with radiologist (Dr. Parikh). Reviewed by me in PACS.       PROCEDURES  Procedures      PROGRESS AND CONSULTS  ED Course   Comment By Time   3:27 PM  Patient with appendicitis.  No perforation.  Given zosyn.  Discussed with Dr. Wills who will admit. Darrell Montanez MD 07/21 1528     1233 - Ordered labs for further evaluation. Ordered Zofran for nausea and Dilaudid for pain. Ordered IVF for hydration.    1319 - Ordered CT Abd/Pelvis for further evaluation.     1442 - Ordered Zosyn. Placed call to Surgery.    1443 - Rechecked pt. Pt is resting comfortably. Notified pt of diagnosis of appendcitis. Discussed the plan to admit the pt for surgery. Pt and family agree with the plan and all questions were addressed.    1519 - Discussed pt's case with  (surgery), who agreed to admit the pt.    1523 - Notified pt of admission and surgery plan. Pt understands and agrees with plan, all questions addressed    MEDICAL DECISION MAKING  Results were reviewed/discussed with the patient and they were also made aware of online access. Pt also made aware that some labs, such as cultures, will not be resulted during ER visit and follow up with PMD is  necessary.     MDM  Number of Diagnoses or Management Options     Amount and/or Complexity of Data Reviewed  Clinical lab tests: ordered and reviewed (WBC - 12.04)  Tests in the radiology section of CPT®: ordered and reviewed (CT Abd/Pelvis - Findings are consistent with acute appendicitis.  Mesenteric fluid also layering within the pelvis and in the perihepatic space is likely reactive. No extraluminal air or fluid collection is present.)           DIAGNOSIS  Final diagnoses:   Acute appendicitis with localized peritonitis       DISPOSITION  ADMISSION    Discussed treatment plan and reason for admission with pt/family and admitting physician.  Pt/family voiced understanding of the plan for admission for further testing/treatment as needed.         Latest Documented Vital Signs:  As of 3:29 PM  BP- 108/67 HR- 77 Temp- 99.6 °F (37.6 °C) (Tympanic) O2 sat- 96%    --  Documentation assistance provided by lisbeth Gil for Dr. Montanez.  Information recorded by the scribe was done at my direction and has been verified and validated by me.     Jake Gil  07/21/17 1530       Darrell Montanez MD  07/21/17 2030

## 2017-07-21 NOTE — H&P
General Surgery H&P    CC: Abdominal pain    HPI:   The patient is a very pleasant 42 y.o. female that presented to the hospital with acute onset of abdominal pain that began yesterday morning and has progressively worsened in severity. The distribution of the abdominal pain was initially diffuse but has now localized to the right lower quadrant and is a sharp/stabbing sensation. Movement makes the pain worse. She denies any fevers, chills, nausea, or vomiting. She attempted to take Pepto-Bismol and an over-the-counter antacid yesterday but noticed no relief. She came to the [facility] [place] [facility] [place] today and was referred to the ER here at [facility] for presumed acute appendicitis. Work-up here confirmed this via CT abdomen/pelvis revealing a fecalith at the appendiceal base and multiple fecaliths within the tip.    Past Medical History:  Spinal stenosis  Post-operative nausea & vomiting  Pituitary microadenoma  Hypothyroidism  History of migraine headaches  Asthma  Seasonal allergies    Past Surgical History:  Lumbar spinal fusion L4-5 (last year, Dr. Olvera & Dr. Dietz)  Breast augmentation  Abdominoplasty  [place] tooth extraction    Medications:  Tylenol  Calcium Carbonate-Cholecalciferol  Cetirizine  Citalopram  Levothyroxine  Cyanocobalamin    Allergies: No known drug allergies    Social History: , nonsmoker, weekly alcohol use (7 glasses of wine per week on average)    Family History: No family history of gastrointestinal malignancy    Review of Systems:  A comprehensive review of systems was negative except for the following positives: abdominal pain    Physical Exam:   Vitals:    07/21/17 1413   BP: 108/67   Pulse: 77   Resp:    Temp:    SpO2: 96%     GENERAL: alert, well appearing, and in no distress and oriented to person, place, and time  HEENT: normocephalic, atraumatic, no scleral icterus, moist mucous membranes.  NECK: Supple, there is no thyromegaly or lymphadenopathy  CHEST: clear to  auscultation, no wheezes, rales or rhonchi, symmetric air entry  CARDIAC: regular rate and rhythm    ABDOMEN: soft, nondistended, focal RLQ abdominal tenderness to palpation with rebound tenderness  EXTREMITIES: no cyanosis, clubbing, or edema   NEURO: alert and oriented, normal speech, cranial nerves 2-12 grossly intact, no focal deficits   SKIN: Moist, warm, no rashes, no jaundice    Diagnostic workup:     Pertinent labs:     Results from last 7 days  Lab Units 07/21/17  1301   WBC 10*3/mm3 12.04*   HEMOGLOBIN g/dL 14.7   HEMATOCRIT % 45.2   PLATELETS 10*3/mm3 191       Results from last 7 days  Lab Units 07/21/17  1301   SODIUM mmol/L 138   POTASSIUM mmol/L 4.1   CHLORIDE mmol/L 98   CO2 mmol/L 21.3*   BUN mg/dL 10   CREATININE mg/dL 0.66   CALCIUM mg/dL 9.0   BILIRUBIN mg/dL 1.0   ALK PHOS U/L 57   ALT (SGPT) U/L 11   AST (SGOT) U/L 10   GLUCOSE mg/dL 101*       IMAGING:  CT ABDOMEN/PELVIS:  IMPRESSION:  Findings are consistent with acute appendicitis. Mesenteric fluid also layering within the pelvis and in the perihepatic space is likely reactive. No extraluminal air or fluid collection is present.    I personally have reviewed the above imaging and found the following additional findings: Elongated inflamed appendix is visualized coursing into the pelvis with multiple fecaliths in the tip and a larger fecalith just beyond the appendiceal base.      Assessment and plan:     The patient is a 42 y.o. female with acute appendicitis    I personally reviewed the patient's CT scan and it appears she has acute appendicitis with no obvious signs of perforation yet. I have counseled her on the need for urgent laparoscopic appendectomy this evening and discussed the risks of the surgery to include wound infection, possible post-operative abscess formation, and possible damage to surrounding structures such as the cecum or small bowel. We also discussed the possible need to convert to an open procedure if she has signs of  perforation with multiple inter-loop abscesses present. She and her  expressed understanding and have consented to proceed. Begin IVF and empiric antibiotics pre-operatively.    Leticia Elizabeth MD  General and Endoscopic Surgery  [facility]    [address], [address]  [place], [place], [address]  P: [phone]  F: [phone]

## 2017-07-21 NOTE — PLAN OF CARE
Problem: Patient Care Overview (Adult)  Goal: Plan of Care Review  Outcome: Ongoing (interventions implemented as appropriate)    07/21/17 4043   Coping/Psychosocial Response Interventions   Plan Of Care Reviewed With patient   Patient Care Overview   Progress no change   Outcome Evaluation   Outcome Summary/Follow up Plan arrived from PACU, vss, abdomen flat, lap sites intact, denies nausea, clear liquids given       Goal: Adult Individualization and Mutuality  Outcome: Ongoing (interventions implemented as appropriate)  Goal: Discharge Needs Assessment  Outcome: Ongoing (interventions implemented as appropriate)    Problem: Perioperative Period (Adult)  Goal: Signs and Symptoms of Listed Potential Problems Will be Absent or Manageable (Perioperative Period)  Outcome: Ongoing (interventions implemented as appropriate)    Problem: Appendicitis/Appendectomy (Adult)  Goal: Signs and Symptoms of Listed Potential Problems Will be Absent or Manageable (Appendicitis/Appendectomy)  Outcome: Ongoing (interventions implemented as appropriate)

## 2017-07-21 NOTE — ANESTHESIA PREPROCEDURE EVALUATION
Anesthesia Evaluation     Patient summary reviewed and Nursing notes reviewed   history of anesthetic complications: PONV  NPO Solid Status: > 8 hours       Airway   Mallampati: II  TM distance: >3 FB  Neck ROM: full  no difficulty expected  Dental - normal exam     Pulmonary - normal exam   (+) asthma,   Cardiovascular - negative cardio ROS and normal exam        Neuro/Psych- negative ROS  GI/Hepatic/Renal/Endo - negative ROS     Musculoskeletal (-) negative ROS    Abdominal  - normal exam   Substance History - negative use     OB/GYN negative ob/gyn ROS         Other        ROS/Med Hx Other: Pituitary adenoma                              Anesthesia Plan    ASA 3 - emergent     general     intravenous induction   Anesthetic plan and risks discussed with patient.    Plan discussed with CRNA.

## 2017-07-21 NOTE — PROGRESS NOTES
Clinical Pharmacy Services: Medication History    Kaia Mack is a 42 y.o. female presenting to [facility] Emergency Department with chief complaint of: Abdominal pain     Past Medical History:  Past Medical History:   Diagnosis Date   • Anxiety    • Asthma    • Back pain    • Disease of thyroid gland    • Low blood pressure    • Migraine     RARE   • Pituitary adenoma     benign   • PONV (postoperative nausea and vomiting)    • Spinal stenosis        No Known Allergies    Medication information was obtained from: Patients reported medication list      Pharmacy and Phone Number: [facility] [phone]    Medication notes: Patient reported adherence to all current medications. Only sparingly taking tylenol for headaches      Current Outpatient Medications:    Prior to Admission medications    Medication Sig Start Date End Date Taking? Authorizing Provider   acetaminophen (TYLENOL) 500 MG tablet Take 500 mg by mouth Every 6 (Six) Hours As Needed for mild pain (1-3).   Yes Historical Provider, MD   Calcium Carb-Cholecalciferol (CALCIUM + D3 PO) Take 1 tablet by mouth daily.   Yes Historical Provider, MD   cetirizine (ZyrTEC) 10 MG tablet Take 10 mg by mouth every morning.   Yes Historical Provider, MD   Cholecalciferol (VITAMIN D) 1000 UNITS tablet Take 1,000 Units by mouth.   Yes Historical Provider, MD   citalopram (CeleXA) 20 MG tablet Take 20 mg by mouth every morning.   Yes Historical Provider, MD   levothyroxine (SYNTHROID, LEVOTHROID) 100 MCG tablet Take 100 mcg by mouth. 3/24/16  Yes Historical Provider, MD   Turmeric 500 MG capsule Take 1 capsule by mouth Daily.   Yes Historical Provider, MD       This medication list is complete to the best of my knowledge as of 7/21/2017    Please call if questions.     Darius Escamilla, Student Pharmacist

## 2017-07-21 NOTE — PLAN OF CARE
Problem: Perioperative Period (Adult)  Goal: Signs and Symptoms of Listed Potential Problems Will be Absent or Manageable (Perioperative Period)  Outcome: Ongoing (interventions implemented as appropriate)    07/21/17 1925   Perioperative Period   Problems Assessed (Perioperative Period) all   Problems Present (Perioperative Period) none

## 2017-07-21 NOTE — ANESTHESIA POSTPROCEDURE EVALUATION
Patient: Kaia Mack    Procedure Summary     Date Anesthesia Start Anesthesia Stop Room / Location    07/21/17 [id number]  [facility] /  [facility]       Procedure Diagnosis Surgeon Provider    APPENDECTOMY LAPAROSCOPIC (N/A Abdomen) Acute appendicitis with localized peritonitis  (Acute appendicitis with localized peritonitis [K35.3]) MD Charlie Goins MD          Anesthesia Type: general  Last vitals  BP   104/68 (07/21/17 1800)    Temp   36.7 °C (98.1 °F) (07/21/17 1748)    Pulse   75 (07/21/17 1800)   Resp   16 (07/21/17 1800)    SpO2   100 % (07/21/17 1800)      Post Anesthesia Care and Evaluation    Patient location during evaluation: PACU  Patient participation: complete - patient participated  Level of consciousness: awake  Pain score: 2  Pain management: adequate  Airway patency: patent  Anesthetic complications: No anesthetic complications  PONV Status: none  Cardiovascular status: acceptable  Hydration status: acceptable

## 2017-07-22 VITALS
BODY MASS INDEX: 20.49 KG/M2 | HEART RATE: 47 BPM | RESPIRATION RATE: 16 BRPM | SYSTOLIC BLOOD PRESSURE: 82 MMHG | TEMPERATURE: 97.7 F | OXYGEN SATURATION: 100 % | HEIGHT: 64 IN | WEIGHT: 120 LBS | DIASTOLIC BLOOD PRESSURE: 40 MMHG

## 2017-07-22 LAB
ANION GAP SERPL CALCULATED.3IONS-SCNC: 12.5 MMOL/L
BASOPHILS # BLD AUTO: 0.01 10*3/MM3 (ref 0–0.2)
BASOPHILS NFR BLD AUTO: 0.1 % (ref 0–1.5)
BUN BLD-MCNC: 11 MG/DL (ref 6–20)
BUN/CREAT SERPL: 16.2 (ref 7–25)
CALCIUM SPEC-SCNC: 8.1 MG/DL (ref 8.6–10.5)
CHLORIDE SERPL-SCNC: 103 MMOL/L (ref 98–107)
CO2 SERPL-SCNC: 23.5 MMOL/L (ref 22–29)
CREAT BLD-MCNC: 0.68 MG/DL (ref 0.57–1)
DEPRECATED RDW RBC AUTO: 47.8 FL (ref 37–54)
EOSINOPHIL # BLD AUTO: 0.01 10*3/MM3 (ref 0–0.7)
EOSINOPHIL NFR BLD AUTO: 0.1 % (ref 0.3–6.2)
ERYTHROCYTE [DISTWIDTH] IN BLOOD BY AUTOMATED COUNT: 13 % (ref 11.7–13)
GFR SERPL CREATININE-BSD FRML MDRD: 96 ML/MIN/1.73
GLUCOSE BLD-MCNC: 112 MG/DL (ref 65–99)
HCT VFR BLD AUTO: 35.5 % (ref 35.6–45.5)
HGB BLD-MCNC: 11.5 G/DL (ref 11.9–15.5)
IMM GRANULOCYTES # BLD: 0.02 10*3/MM3 (ref 0–0.03)
IMM GRANULOCYTES NFR BLD: 0.2 % (ref 0–0.5)
LYMPHOCYTES # BLD AUTO: 1.25 10*3/MM3 (ref 0.9–4.8)
LYMPHOCYTES NFR BLD AUTO: 12.6 % (ref 19.6–45.3)
MCH RBC QN AUTO: 32.6 PG (ref 26.9–32)
MCHC RBC AUTO-ENTMCNC: 32.4 G/DL (ref 32.4–36.3)
MCV RBC AUTO: 100.6 FL (ref 80.5–98.2)
MONOCYTES # BLD AUTO: 0.72 10*3/MM3 (ref 0.2–1.2)
MONOCYTES NFR BLD AUTO: 7.2 % (ref 5–12)
NEUTROPHILS # BLD AUTO: 7.94 10*3/MM3 (ref 1.9–8.1)
NEUTROPHILS NFR BLD AUTO: 79.8 % (ref 42.7–76)
PLATELET # BLD AUTO: 149 10*3/MM3 (ref 140–500)
PMV BLD AUTO: 11.3 FL (ref 6–12)
POTASSIUM BLD-SCNC: 4.9 MMOL/L (ref 3.5–5.2)
RBC # BLD AUTO: 3.53 10*6/MM3 (ref 3.9–5.2)
SODIUM BLD-SCNC: 139 MMOL/L (ref 136–145)
WBC NRBC COR # BLD: 9.95 10*3/MM3 (ref 4.5–10.7)

## 2017-07-22 PROCEDURE — 99024 POSTOP FOLLOW-UP VISIT: CPT | Performed by: SURGERY

## 2017-07-22 PROCEDURE — 80048 BASIC METABOLIC PNL TOTAL CA: CPT | Performed by: SURGERY

## 2017-07-22 PROCEDURE — 94799 UNLISTED PULMONARY SVC/PX: CPT

## 2017-07-22 PROCEDURE — 25010000002 PIPERACILLIN SOD-TAZOBACTAM PER 1 G: Performed by: SURGERY

## 2017-07-22 PROCEDURE — 85025 COMPLETE CBC W/AUTO DIFF WBC: CPT | Performed by: SURGERY

## 2017-07-22 PROCEDURE — G0378 HOSPITAL OBSERVATION PER HR: HCPCS

## 2017-07-22 RX ORDER — AMOXICILLIN AND CLAVULANATE POTASSIUM 875; 125 MG/1; MG/1
1 TABLET, FILM COATED ORAL EVERY 12 HOURS SCHEDULED
Qty: 10 TABLET | Refills: 0 | Status: SHIPPED | OUTPATIENT
Start: 2017-07-22 | End: 2017-07-27

## 2017-07-22 RX ORDER — OXYCODONE HYDROCHLORIDE AND ACETAMINOPHEN 5; 325 MG/1; MG/1
1 TABLET ORAL EVERY 4 HOURS PRN
Qty: 30 TABLET | Refills: 0 | Status: SHIPPED | OUTPATIENT
Start: 2017-07-22 | End: 2017-08-01

## 2017-07-22 RX ORDER — AMOXICILLIN AND CLAVULANATE POTASSIUM 875; 125 MG/1; MG/1
1 TABLET, FILM COATED ORAL EVERY 12 HOURS SCHEDULED
Status: DISCONTINUED | OUTPATIENT
Start: 2017-07-22 | End: 2017-07-22 | Stop reason: HOSPADM

## 2017-07-22 RX ADMIN — CITALOPRAM 20 MG: 20 TABLET, FILM COATED ORAL at 07:37

## 2017-07-22 RX ADMIN — TAZOBACTAM SODIUM AND PIPERACILLIN SODIUM 3.38 G: 375; 3 INJECTION, SOLUTION INTRAVENOUS at 04:01

## 2017-07-22 RX ADMIN — OXYCODONE HYDROCHLORIDE AND ACETAMINOPHEN 1 TABLET: 5; 325 TABLET ORAL at 04:01

## 2017-07-22 RX ADMIN — VITAMIN D, TAB 1000IU (100/BT) 1000 UNITS: 25 TAB at 11:49

## 2017-07-22 RX ADMIN — CALCIUM CARBONATE 1250 MG: 1250 TABLET ORAL at 11:49

## 2017-07-22 RX ADMIN — AMOXICILLIN AND CLAVULANATE POTASSIUM 1 TABLET: 875; 125 TABLET, FILM COATED ORAL at 11:49

## 2017-07-22 RX ADMIN — CETIRIZINE HYDROCHLORIDE 10 MG: 10 TABLET, FILM COATED ORAL at 07:37

## 2017-07-22 RX ADMIN — OXYCODONE HYDROCHLORIDE AND ACETAMINOPHEN 1 TABLET: 5; 325 TABLET ORAL at 11:59

## 2017-07-22 NOTE — DISCHARGE SUMMARY
Discharge Summary    Patient name: Kaia Mack    Medical record number: [id number]    Admission date: 7/21/2017  Discharge date: 7/22/2017     Attending physician: Leticia Elizabeth MD    Primary care physician: Lisa Felix DO    Referring physician: Leticia Elizabeth MD  [address] [address]    Consulting physician(s): None    Condition on discharge: improving    Admitting diagnosis:   Patient Active Problem List   Diagnosis   • Surgery follow-up examination   • Acute appendicitis with localized peritonitis       Final diagnosis: Acute appendicitis with suppurative localized peritonitis    Procedures: Laparoscopic appendectomy performed 7/21/2017    History of present illness: The patient is a  42 y.o. female that was admitted to the hospital with Diffuse abdominal pain that localized to the right lower quadrant after some time and was diagnosed with acute appendicitis via CT scan of the abdomen and pelvis.  She was admitted for further management.    Hospital course: The patient underwent an immediate lap scopic appendectomy where she was found to have an acutely inflamed but not perforated appendix with suppurative localized peritonitis surrounding the appendix within the right lower pelvic space.  The purulent fluid surrounding the appendix and also up over the liver was suctioned dry and the appendix was removed easily.  She was kept overnight for observation and maintained on IV Zosyn for empiric antibiotic therapy.  She was transitioned over to oral Augmentin on the day of discharge.  On that H a discharge, postop day #1, she was tolerating a regular diet with no nausea or vomiting, voiding independently, and ambulating without difficulty.  She was instructed on signs and symptoms to pay attention for that may indicate a postoperative abscess formation and knows to call immediately if any of these occur.    Discharge medications:    Kaia Mack   Home Medication  Instructions KAMILAH:[id number]    Printed on:07/22/17 1037   Medication Information                      acetaminophen (TYLENOL) 500 MG tablet  Take 500 mg by mouth Every 6 (Six) Hours As Needed for mild pain (1-3).             amoxicillin-clavulanate (AUGMENTIN) 875-125 MG per tablet  Take 1 tablet by mouth Every 12 (Twelve) Hours for 5 days. Indications: Infection Within the Abdomen             Calcium Carb-Cholecalciferol (CALCIUM + D3 PO)  Take 1 tablet by mouth daily.             cetirizine (ZyrTEC) 10 MG tablet  Take 10 mg by mouth every morning.             Cholecalciferol (VITAMIN D) 1000 UNITS tablet  Take 1,000 Units by mouth Daily.             citalopram (CeleXA) 20 MG tablet  Take 20 mg by mouth every morning.             levothyroxine (SYNTHROID, LEVOTHROID) 100 MCG tablet  Take 100 mcg by mouth Daily.             oxyCODONE-acetaminophen (PERCOCET) 5-325 MG per tablet  Take 1 tablet by mouth Every 4 (Four) Hours As Needed (pain).             Turmeric 500 MG capsule  Take 1 capsule by mouth Daily.                 Discharge instructions:    - Resume regular diet as tolerated  - No heavy lifting of more than 15 lb for 2 weeks. Can start doing aerobic type exercise in 2 weeks.   - No driving while taking narcotic pain medications  - You may resume showering, no tub bathing for two weeks while your incisions heal.  - Wash your incisions with soap and water daily in the shower, pat dry, and leave open to air.    Follow-up appointment: Follow up with Leticia Elizabeth MD in the office in 2 weeks. Call for appointment at [phone].    CODE STATUS: Full code

## 2017-07-22 NOTE — PLAN OF CARE
Problem: Patient Care Overview (Adult)  Goal: Plan of Care Review  Outcome: Outcome(s) achieved Date Met:  07/22/17 07/22/17 1044   Coping/Psychosocial Response Interventions   Plan Of Care Reviewed With patient   Patient Care Overview   Progress progress toward functional goals as expected   Outcome Evaluation   Outcome Summary/Follow up Plan lap sites x 3 dry & intact with dermabond, passing flatus, no c/o pain or nausea, d/c'd home with spouse       Goal: Adult Individualization and Mutuality  Outcome: Outcome(s) achieved Date Met:  07/22/17  Goal: Discharge Needs Assessment  Outcome: Outcome(s) achieved Date Met:  07/22/17    Problem: Perioperative Period (Adult)  Goal: Signs and Symptoms of Listed Potential Problems Will be Absent or Manageable (Perioperative Period)  Outcome: Outcome(s) achieved Date Met:  07/22/17    Problem: Appendicitis/Appendectomy (Adult)  Goal: Signs and Symptoms of Listed Potential Problems Will be Absent or Manageable (Appendicitis/Appendectomy)  Outcome: Outcome(s) achieved Date Met:  07/22/17    Problem: Pain, Acute (Adult)  Goal: Identify Related Risk Factors and Signs and Symptoms  Outcome: Outcome(s) achieved Date Met:  07/22/17  Goal: Acceptable Pain Control/Comfort Level  Outcome: Outcome(s) achieved Date Met:  07/22/17

## 2017-07-22 NOTE — PLAN OF CARE
Problem: Patient Care Overview (Adult)  Goal: Plan of Care Review  Outcome: Ongoing (interventions implemented as appropriate)    07/22/17 0654   Coping/Psychosocial Response Interventions   Plan Of Care Reviewed With patient;spouse   Patient Care Overview   Progress progress toward functional goals as expected   Outcome Evaluation   Outcome Summary/Follow up Plan tolerating food, inc sites unremarkable, med for pain, walked       Goal: Adult Individualization and Mutuality  Outcome: Ongoing (interventions implemented as appropriate)  Goal: Discharge Needs Assessment  Outcome: Ongoing (interventions implemented as appropriate)    Problem: Appendicitis/Appendectomy (Adult)  Goal: Signs and Symptoms of Listed Potential Problems Will be Absent or Manageable (Appendicitis/Appendectomy)  Outcome: Ongoing (interventions implemented as appropriate)    Problem: Pain, Acute (Adult)  Goal: Identify Related Risk Factors and Signs and Symptoms  Outcome: Ongoing (interventions implemented as appropriate)  Goal: Acceptable Pain Control/Comfort Level  Outcome: Ongoing (interventions implemented as appropriate)

## 2017-07-24 LAB
CYTO UR: NORMAL
LAB AP CASE REPORT: NORMAL
Lab: NORMAL
PATH REPORT.FINAL DX SPEC: NORMAL
PATH REPORT.GROSS SPEC: NORMAL

## 2017-08-01 ENCOUNTER — OFFICE VISIT (OUTPATIENT)
Dept: SURGERY | Facility: CLINIC | Age: 42
End: 2017-08-01

## 2017-08-01 DIAGNOSIS — K35.30 ACUTE APPENDICITIS WITH LOCALIZED PERITONITIS: Primary | ICD-10-CM

## 2017-08-01 PROCEDURE — 99024 POSTOP FOLLOW-UP VISIT: CPT | Performed by: SURGERY

## 2017-08-01 NOTE — PROGRESS NOTES
CHIEF COMPLAINT:   Chief Complaint   Patient presents with   • Post-op     NPO Laparoscopic appendectomy 7/21/17       HISTORY OF PRESENT ILLNESS:  This is a 42 y.o. female  who presents for a post-operative visit after undergoing a laparoscopic appendectomy on 7/21/2017 for acute appendicitis. She was discharged home on postoperative day #1, and has been recovering well at home.  She denies any fevers or chills, and has been returning to all normal activities.  He is been tolerating regular diet with no dysuria, and her constipation is resolved now that she is no longer using the Percocet prescribed.      Pathology:   APPENDIX, APPENDECTOMY:   SEVERE ACUTE APPENDICITIS.     PHYSICAL EXAM:  Lungs: Clear  Heart: RRR  ABD: Incisions are healing well without any erythema or signs of infection.  Ext: no significant edema, calves nontender    A/P:  This is a 42 y.o. female patient who is S/P laparoscopic appendectomy for acute appendicitis    She is healing beautifully, and can follow-up with me as needed.  I have cleared her to return to all normal activities including working out and kickboxing.    Leticia Elizabeth MD  General and Endoscopic Surgery  [facility]    [address], [address]  [place], [place], [address]  P: [phone]  F: [phone]

## 2018-02-02 ENCOUNTER — OFFICE VISIT (OUTPATIENT)
Dept: INTERNAL MEDICINE | Age: 43
End: 2018-02-02

## 2018-02-02 VITALS
BODY MASS INDEX: 20.66 KG/M2 | OXYGEN SATURATION: 98 % | HEART RATE: 72 BPM | WEIGHT: 121 LBS | SYSTOLIC BLOOD PRESSURE: 102 MMHG | TEMPERATURE: 97.6 F | DIASTOLIC BLOOD PRESSURE: 72 MMHG | HEIGHT: 64 IN

## 2018-02-02 DIAGNOSIS — J30.9 CHRONIC ALLERGIC RHINITIS, UNSPECIFIED SEASONALITY, UNSPECIFIED TRIGGER: ICD-10-CM

## 2018-02-02 DIAGNOSIS — M26.609 TMJ (TEMPOROMANDIBULAR JOINT DISORDER): Chronic | ICD-10-CM

## 2018-02-02 DIAGNOSIS — E03.9 HYPOTHYROIDISM, UNSPECIFIED TYPE: Chronic | ICD-10-CM

## 2018-02-02 DIAGNOSIS — F51.05 INSOMNIA DUE TO OTHER MENTAL DISORDER: Chronic | ICD-10-CM

## 2018-02-02 DIAGNOSIS — K58.8 OTHER IRRITABLE BOWEL SYNDROME: Chronic | ICD-10-CM

## 2018-02-02 DIAGNOSIS — D35.2 PITUITARY ADENOMA (HCC): Chronic | ICD-10-CM

## 2018-02-02 DIAGNOSIS — F41.3 OTHER MIXED ANXIETY DISORDERS: Primary | ICD-10-CM

## 2018-02-02 DIAGNOSIS — F99 INSOMNIA DUE TO OTHER MENTAL DISORDER: Chronic | ICD-10-CM

## 2018-02-02 PROBLEM — F41.9 ANXIETY: Status: ACTIVE | Noted: 2018-02-02

## 2018-02-02 PROBLEM — K35.30 ACUTE APPENDICITIS WITH LOCALIZED PERITONITIS: Status: RESOLVED | Noted: 2017-07-21 | Resolved: 2018-02-02

## 2018-02-02 PROCEDURE — 99204 OFFICE O/P NEW MOD 45 MIN: CPT | Performed by: INTERNAL MEDICINE

## 2018-02-02 RX ORDER — CITALOPRAM 20 MG/1
20 TABLET ORAL EVERY MORNING
Qty: 90 TABLET | Refills: 1 | Status: SHIPPED | OUTPATIENT
Start: 2018-02-02 | End: 2018-08-21 | Stop reason: SDUPTHER

## 2018-02-02 RX ORDER — LEVOTHYROXINE SODIUM 88 UG/1
88 TABLET ORAL DAILY
COMMUNITY

## 2018-02-02 RX ORDER — FLUTICASONE PROPIONATE 50 MCG
1 SPRAY, SUSPENSION (ML) NASAL 2 TIMES DAILY
Qty: 1 BOTTLE | Refills: 5 | Status: SHIPPED | OUTPATIENT
Start: 2018-02-02

## 2018-02-02 NOTE — PROGRESS NOTES
"[facility]  JEWEL BONILLA M.D.      Kaia GUTHRIE Frederick / 42 y.o. / female  02/02/2018    VITALS:    Visit Vitals   • /72   • Pulse 72   • Temp 97.6 °F (36.4 °C)   • Ht 161.3 cm (63.5\")   • Wt 54.9 kg (121 lb)   • SpO2 98%   • BMI 21.1 kg/m2       BP Readings from Last 3 Encounters:   02/02/18 102/72   07/22/17 (!) 82/40   07/21/17 113/56     Wt Readings from Last 3 Encounters:   02/02/18 54.9 kg (121 lb)   07/21/17 54.4 kg (120 lb)   01/24/17 51.7 kg (114 lb)      Body mass index is 21.1 kg/(m^2).    CC: Main reason(s) for today's visit: Establish Care (Former Dr [facility]) and Anxiety      HPI:    Patient is a 42 y.o. female who is here to establish care. Her previous PCP went into  practice (Isidro).    H/o microadenoma of the pituitary followed by Dr. Guevara.   H/o hypothyroidism (told it was secondary) and has strong family h/o hypothyroidism and Grave's dz.   She is on levothyroxine 88 mcg.     Chronic anxiety disorder: on citalopram 20 mg daily. Recently was increased to 40 mg due to increased anxiety problem, bruxism but she went back down to 20 mg and is doing better now. Strong family h/o bipolar I disorder in mother / MGM. She denies h/o full blown depression or thai. She may have mild postpartum depression after 1st child. C/o restless sleep and bruxism and told she has TMJ. Has tried OTC biteguard which did not help. C/o left ear feeling congested and had seen ENT. Uses cetirizine daily.       Patient Care Team:  Mason Bonilla MD as PCP - General (Internal Medicine)  Nova Guevara MD as Consulting Physician (Endocrinology)  Yael Carson MD (Dermatology)  Ping Cisneros MD as Consulting Physician (Obstetrics and Gynecology)  ____________________________________________________________________    ASSESSMENT & PLAN:    Problem List Items Addressed This Visit        High    Other mixed anxiety disorders - Primary (Chronic)    Current Assessment & Plan     " Stable. Continue citalopram 20 mg qd.          Relevant Medications    citalopram (CeleXA) 20 MG tablet       Medium    Hypothyroidism (Chronic)    Overview     *[facility]  Continue current dose of levothyroxine and f/u with endo.          Relevant Medications    levothyroxine (SYNTHROID, LEVOTHROID) 88 MCG tablet    Pituitary adenoma (Chronic)    Overview     *[facility]  Continue monitoring by endo.          TMJ (temporomandibular joint disorder) (Chronic)    Current Assessment & Plan     D/w dentist re: bite guard for nighttime use.             Low    Insomnia (Chronic)    Current Assessment & Plan     Counseled on sleep hygiene.          Other irritable bowel syndrome (Chronic)    Current Assessment & Plan     Discussed dietary mods. Continue probiotic.          Chronic allergic rhinitis    Current Assessment & Plan     Use fluticasone nasal spray 1 spray BID.  Continue cetirizine daily.          Relevant Medications    fluticasone (FLONASE) 50 MCG/ACT nasal spray           Summary/Discussion:     ·       Return in about 6 months (around 8/2/2018) for Reassess today's problem(s).    Future Appointments  Date Time Provider Department Center   8/21/2018 10:00 AM Mason Bonilla MD [facility] None       ____________________________________________________________________        REVIEW OF SYSTEMS    Review of Systems   Constitutional: Negative.    HENT: Negative.         Left ear congestions, TMJ   Eyes: Negative.    Respiratory: Negative.    Cardiovascular: Negative.    Gastrointestinal: Positive for abdominal distention and constipation.   Endocrine: Negative.  Negative for cold intolerance.   Genitourinary: Negative.    Musculoskeletal: Negative.  Back pain: s/p back spinal fusion.   Skin: Negative.    Allergic/Immunologic: Negative.    Neurological: Negative.    Hematological: Negative.    Psychiatric/Behavioral: Positive for sleep disturbance. Negative for dysphoric mood, self-injury and suicidal ideas. The patient  is nervous/anxious.          PHYSICAL EXAMINATION    Physical Exam   Constitutional: She is oriented to person, place, and time. She appears well-developed and well-nourished. No distress.   HENT:   Head: Normocephalic and atraumatic.   Right Ear: Tympanic membrane and external ear normal.   Left Ear: Tympanic membrane and external ear normal.   Mouth/Throat: Oropharynx is clear and moist and mucous membranes are normal. No oral lesions.   Eyes: Conjunctivae and EOM are normal. Pupils are equal, round, and reactive to light. No scleral icterus.   Neck: Neck supple. No tracheal deviation present. No thyroid mass and no thyromegaly present.   Cardiovascular: Normal rate, regular rhythm, normal heart sounds and intact distal pulses.    Pulmonary/Chest: Effort normal and breath sounds normal.   Abdominal: Soft. Bowel sounds are normal. She exhibits no distension and no mass. There is no tenderness. No hernia.   Musculoskeletal: Normal range of motion. She exhibits no edema.   Lymphadenopathy:     She has no cervical adenopathy.        Right: No supraclavicular adenopathy present.        Left: No supraclavicular adenopathy present.   Neurological: She is alert and oriented to person, place, and time. She has normal reflexes. No cranial nerve deficit. She exhibits normal muscle tone. Coordination normal.   Skin: Skin is warm. No rash noted. No pallor.   Psychiatric: She has a normal mood and affect. Her behavior is normal. Judgment and thought content normal.       REVIEWED DATA:    Labs:   Lab Results   Component Value Date     07/22/2017    K 4.9 07/22/2017    AST 10 07/21/2017    ALT 11 07/21/2017    BUN 11 07/22/2017    CREATININE 0.68 07/22/2017    CREATININE 0.66 07/21/2017    CREATININE 0.71 09/16/2016    EGFRIFNONA 96 07/22/2017    EGFRIFAFRI  09/16/2016      Comment:      <15 Indicative of kidney failure.       No results found for: GLU, HGBA1C, MICROALBUR    No results found for: LDL, HDL, TRIG,  CHOLHDLRATIO    No results found for: TSH, FREET4       Lab Results   Component Value Date    WBC 9.95 07/22/2017    HGB 11.5 (L) 07/22/2017    HGB 14.7 07/21/2017    HGB 10.1 (L) 09/29/2016     07/22/2017         Imaging:        Medical Tests:        Summary of old records / correspondence / consultant report:        Request outside records:    From Endocrine ([facility])    ______________________________________________________________________    ALLERGIES  No Known Allergies     MEDICATIONS  Current Outpatient Prescriptions   Medication Sig Dispense Refill   • acetaminophen (TYLENOL) 500 MG tablet Take 500 mg by mouth Every 6 (Six) Hours As Needed for mild pain (1-3).     • Calcium Carb-Cholecalciferol (CALCIUM + D3 PO) Take 1 tablet by mouth daily.     • cetirizine (ZyrTEC) 10 MG tablet Take 10 mg by mouth every morning.     • Cholecalciferol (VITAMIN D) 1000 UNITS tablet Take 1,000 Units by mouth Daily.     • citalopram (CeleXA) 20 MG tablet Take 1 tablet by mouth Every Morning. 90 tablet 1   • levothyroxine (SYNTHROID, LEVOTHROID) 88 MCG tablet Take 88 mcg by mouth Daily.     • Probiotic Product (PROBIOTIC DAILY PO) Take  by mouth.     • Turmeric 500 MG capsule Take 1 capsule by mouth Daily.         PFSH:     The following portions of the patient's history were reviewed and updated as appropriate: Allergies / Current Medications / Past Medical History / Surgical History / Social History / Family History    PROBLEM LIST   Patient Active Problem List   Diagnosis   • Insomnia   • Iron deficiency anemia   • Vitamin D deficiency   • Hypothyroidism   • Pituitary adenoma   • Other mixed anxiety disorders   • Chronic allergic rhinitis   • TMJ (temporomandibular joint disorder)   • Other irritable bowel syndrome       PAST MEDICAL HISTORY  Past Medical History:   Diagnosis Date   • Acute appendicitis with localized peritonitis 7/21/2017   • Anxiety    • Asthma    • Hypothyroidism    • Lumbar canal stenosis  8/16/2016   • Migraine     RARE   • Pituitary adenoma     benign   • Spinal stenosis    • Spondylolisthesis of lumbar region 8/16/2016       SURGICAL HISTORY  Past Surgical History:   Procedure Laterality Date   • ABDOMINOPLASTY N/A 02/12/2016    Dr. Isidro Nguyen   • APPENDECTOMY N/A 7/21/2017    Procedure: APPENDECTOMY LAPAROSCOPIC;  Surgeon: Leticia Elizabeth MD;  Location: [facility];  Service:    • BREAST AUGMENTATION Bilateral 02/12/2016    Dr. Isidro Nguyen   • DENTAL EXAMINATION UNDER ANESTHESIA     • LUMBAR DISCECTOMY FUSION INSTRUMENTATION N/A 9/26/2016    Procedure: L4-5  fusion with instrumentation;  Surgeon: Isidro Dietz MD;  Location: [facility] OR;  Service:    • LUMBAR LAMINECTOMY DISCECTOMY DECOMPRESSION N/A 9/26/2016    Procedure: L4-L5 LUMBAR LAMINECTOMY;  Surgeon: Darrell Olvera MD;  Location: [facility] OR;  Service:    • SCAR REVISION BREAST Bilateral 4/29/2016    Procedure: REVISION GABBY BREAST, REMOVE AND REPLACE RIGHT BREAST IMPLANT;  Surgeon: Isidro Nguyen MD;  Location: [facility] OR;  Service:    • WISDOM TOOTH EXTRACTION Bilateral        SOCIAL HISTORY  Social History     Social History   • Marital status:      Spouse name: Fidel   • Number of children: 3   • Years of education: Bachelors     Occupational History   • Homemaker      Social History Main Topics   • Smoking status: Never Smoker   • Smokeless tobacco: Never Used   • Alcohol use 1.8 - 3.0 oz/week     3 - 5 Glasses of wine per week   • Drug use: No   • Sexual activity: Yes     Partners: Male      Comment:  had vasectomy     Other Topics Concern   • None     Social History Narrative       FAMILY HISTORY  Family History   Problem Relation Age of Onset   • Lymphoma Father      CLL   • Hypothyroidism Father    • Colon polyps Father    • Breast cancer Mother 59   • Bipolar disorder Mother      type 1   • Diabetes Mother      type 2   • Skin cancer Sister    • Melanoma Sister    • Coronary artery disease  Brother 53   • Drug abuse Brother    • Heart failure Maternal Grandmother    • Coronary artery disease Maternal Grandmother    • Bipolar disorder Maternal Grandmother    • Coronary artery disease Maternal Grandfather    • Diabetes Paternal Grandmother    • Prostate cancer Paternal Grandfather    • Graves' disease Paternal Aunt    • Abnormal EKG Paternal Aunt    • Alcohol abuse Maternal Uncle    • Drug abuse Maternal Uncle    • Colon cancer Neg Hx          **Kellieon Disclaimer:   Much of this encounter note is an electronic transcription/translation of spoken language to printed text. The electronic translation of spoken language may permit erroneous, or at times, nonsensical words or phrases to be inadvertently transcribed. Although I have reviewed the note for such errors, some may still exist.

## 2018-08-21 ENCOUNTER — OFFICE VISIT (OUTPATIENT)
Dept: INTERNAL MEDICINE | Age: 43
End: 2018-08-21

## 2018-08-21 VITALS
HEART RATE: 58 BPM | WEIGHT: 120 LBS | OXYGEN SATURATION: 97 % | SYSTOLIC BLOOD PRESSURE: 106 MMHG | HEIGHT: 64 IN | DIASTOLIC BLOOD PRESSURE: 70 MMHG | TEMPERATURE: 98.8 F | BODY MASS INDEX: 20.49 KG/M2

## 2018-08-21 DIAGNOSIS — D35.2 PITUITARY ADENOMA (HCC): Chronic | ICD-10-CM

## 2018-08-21 DIAGNOSIS — E03.9 HYPOTHYROIDISM, UNSPECIFIED TYPE: Chronic | ICD-10-CM

## 2018-08-21 DIAGNOSIS — F41.3 OTHER MIXED ANXIETY DISORDERS: Primary | Chronic | ICD-10-CM

## 2018-08-21 DIAGNOSIS — M26.609 TMJ (TEMPOROMANDIBULAR JOINT DISORDER): Chronic | ICD-10-CM

## 2018-08-21 PROCEDURE — 99214 OFFICE O/P EST MOD 30 MIN: CPT | Performed by: INTERNAL MEDICINE

## 2018-08-21 RX ORDER — CITALOPRAM 10 MG/1
TABLET ORAL
Qty: 14 TABLET | Refills: 0 | Status: SHIPPED | OUTPATIENT
Start: 2018-08-21 | End: 2018-10-08 | Stop reason: SDUPTHER

## 2018-08-21 NOTE — ASSESSMENT & PLAN NOTE
Has been off citalopram x 2 weeks and is interested in trying off medication. Titrate off citalopram 10 mg over 1 month (Rx sent to pharmacy). She was instructed to call if she feels she needs to restart medication later on. She expressed understanding and agreed to follow above instructions.

## 2018-08-21 NOTE — PROGRESS NOTES
[facility] – [place], [place] INTERNAL MEDICINE  JEWEL WAYNE M.D.      Kaia Mack / 43 y.o. / female  08/21/2018      ASSESSMENT & PLAN:    Problem List Items Addressed This Visit        High    Other mixed anxiety disorders - Primary (Chronic)    Current Assessment & Plan     Has been off citalopram x 2 weeks and is interested in trying off medication. Titrate off citalopram 10 mg over 1 month (Rx sent to pharmacy). She was instructed to call if she feels she needs to restart medication later on. She expressed understanding and agreed to follow above instructions.          Relevant Medications    citalopram (CeleXA) 10 MG tablet       Medium    TMJ (temporomandibular joint disorder) (Chronic)    Current Assessment & Plan     Recommended discuss with dentist for fitted bite guard for nights.             Low    Hypothyroidism (Chronic)    Overview     *Veeneman  Continue current dose of levothyroxine and f/u with endo.          Relevant Medications    levothyroxine (SYNTHROID, LEVOTHROID) 88 MCG tablet    Pituitary adenoma (CMS/HCC) (Chronic)    Overview     *Veenamen  Continue monitoring by endo.              No orders of the defined types were placed in this encounter.    New Medications Ordered This Visit   Medications   • citalopram (CeleXA) 10 MG tablet     Sig: Take 1 tab every other day x 2 weeks, then 1 tab every 3 days for 2 weeks then discontinue medication.     Dispense:  14 tablet     Refill:  0       Summary/Discussion:      Return for No routine follow-up needed.    ____________________________________________________________________    MEDICATIONS  Current Outpatient Prescriptions   Medication Sig Dispense Refill   • acetaminophen (TYLENOL) 500 MG tablet Take 500 mg by mouth Every 6 (Six) Hours As Needed for mild pain (1-3).     • Calcium Carb-Cholecalciferol (CALCIUM + D3 PO) Take 1 tablet by mouth daily.     • cetirizine (ZyrTEC) 10 MG tablet Take 10 mg by mouth every morning.     • Cholecalciferol (VITAMIN D) 1000 UNITS  "tablet Take 1,000 Units by mouth Daily.     • fluticasone (FLONASE) 50 MCG/ACT nasal spray 1 spray into each nostril 2 (Two) Times a Day. 1 bottle 5   • levothyroxine (SYNTHROID, LEVOTHROID) 88 MCG tablet Take 88 mcg by mouth Daily.     • Probiotic Product (PROBIOTIC DAILY PO) Take  by mouth.     • Turmeric 500 MG capsule Take 1 capsule by mouth Daily.           VITALS    Visit Vitals  /70   Pulse 58   Temp 98.8 °F (37.1 °C)   Ht 161.3 cm (63.5\")   Wt 54.4 kg (120 lb)   SpO2 97%   BMI 20.92 kg/m²       BP Readings from Last 3 Encounters:   08/21/18 106/70   02/02/18 102/72   07/22/17 (!) 82/40     Wt Readings from Last 3 Encounters:   08/21/18 54.4 kg (120 lb)   02/02/18 54.9 kg (121 lb)   07/21/17 54.4 kg (120 lb)      Body mass index is 20.92 kg/m².    CC:  Main reason(s) for today's visit: Anxiety      HPI:     Anxiety disorder: she ran out of citalopram 1.5 weeks ago and is doing okay except mild \"dizziness\" sensation. She has taken SSRI for many years for anxiety. She is interested in trying off medication. Denies depression symptoms.     Hypothyroidism is managed by Dr. Guevara. Denies changes in weight, energy and bowel symptoms.   Pituitary adenoma followed by endocrinologist as well.     TMJ: uses home molded bite guard at nights. Still has some left sided ear discomfort.     Patient Care Team:  Mason Bonilla MD as PCP - General (Internal Medicine)  Nova Guevara MD as Consulting Physician (Endocrinology)  Yael Carson MD (Dermatology)  Ping Cisneros MD as Consulting Physician (Obstetrics and Gynecology)  ____________________________________________________________________    REVIEW OF SYSTEMS    Review of Systems  Constitutional neg  Resp neg  CV neg   GI neg change  Neuro mild dizziness  Psych neg for depression    PHYSICAL EXAMINATION    Physical Exam  Psych: Alert, normal thought, mood, affect, and judgment     REVIEWED DATA:    Labs:     Lab Results   Component Value Date    NA " 139 07/22/2017    K 4.9 07/22/2017    AST 10 07/21/2017    ALT 11 07/21/2017    BUN 11 07/22/2017    CREATININE 0.68 07/22/2017    CREATININE 0.66 07/21/2017    CREATININE 0.71 09/16/2016    EGFRIFNONA 96 07/22/2017    EGFRIFAFRI  09/16/2016      Comment:      <15 Indicative of kidney failure.       Lab Results   Component Value Date    GLUCOSE 112 (H) 07/22/2017    GLUCOSE 101 (H) 07/21/2017    GLUCOSE 81 09/16/2016       No results found for: LDL, HDL, TRIG, CHOLHDLRATIO    No results found for: TSH, FREET4    Lab Results   Component Value Date    WBC 9.95 07/22/2017    HGB 11.5 (L) 07/22/2017    HGB 14.7 07/21/2017    HGB 10.1 (L) 09/29/2016     07/22/2017        Imaging:         Medical Tests:         Summary of old records / correspondence / consultant report:         Request outside records:   ** Outside records requested and obtained through [facility] Everywhere. I reviewed  the the following information from [facility] : lab results : Free T4 was fine, vitamin D 46         ALLERGIES  No Known Allergies     PFSH:     The following portions of the patient's history were reviewed and updated as appropriate: Allergies / Current Medications / Past Medical History / Surgical History / Social History / Family History    PROBLEM LIST   Patient Active Problem List   Diagnosis   • Insomnia   • Iron deficiency anemia   • Vitamin D deficiency   • Hypothyroidism   • Pituitary adenoma (CMS/HCC)   • Other mixed anxiety disorders   • Chronic allergic rhinitis   • TMJ (temporomandibular joint disorder)   • Other irritable bowel syndrome       PAST MEDICAL HISTORY  Past Medical History:   Diagnosis Date   • Acute appendicitis with localized peritonitis 7/21/2017   • Anxiety    • Asthma    • Hypothyroidism    • Lumbar canal stenosis 8/16/2016   • Migraine     RARE   • Pituitary adenoma (CMS/HCC)     benign   • Spinal stenosis    • Spondylolisthesis of lumbar region 8/16/2016       SURGICAL HISTORY  Past Surgical History:    Procedure Laterality Date   • ABDOMINOPLASTY N/A 02/12/2016    Dr. Isidro Nguyen   • APPENDECTOMY N/A 7/21/2017    Procedure: APPENDECTOMY LAPAROSCOPIC;  Surgeon: Leticia Elizabeth MD;  Location: [facility] MAIN OR;  Service:    • BREAST AUGMENTATION Bilateral 02/12/2016    Dr. Isidro Nguyen   • DENTAL EXAMINATION UNDER ANESTHESIA     • LUMBAR DISCECTOMY FUSION INSTRUMENTATION N/A 9/26/2016    Procedure: L4-5  fusion with instrumentation;  Surgeon: Isidro Dietz MD;  Location: [facility] MAIN OR;  Service:    • LUMBAR LAMINECTOMY DISCECTOMY DECOMPRESSION N/A 9/26/2016    Procedure: L4-L5 LUMBAR LAMINECTOMY;  Surgeon: Darrell Olvera MD;  Location: [facility] MAIN OR;  Service:    • SCAR REVISION BREAST Bilateral 4/29/2016    Procedure: REVISION GABBY BREAST, REMOVE AND REPLACE RIGHT BREAST IMPLANT;  Surgeon: Isidro Nguyen MD;  Location: [facility] MAIN OR;  Service:    • WISDOM TOOTH EXTRACTION Bilateral        SOCIAL HISTORY  Social History     Social History   • Marital status:      Spouse name: Fidel   • Number of children: 3   • Years of education: Bachelors     Occupational History   • Homemaker      Social History Main Topics   • Smoking status: Never Smoker   • Smokeless tobacco: Never Used   • Alcohol use 1.8 - 3.0 oz/week     3 - 5 Glasses of wine per week   • Drug use: No   • Sexual activity: Yes     Partners: Male      Comment:  had vasectomy     Other Topics Concern   • Not on file       FAMILY HISTORY  Family History   Problem Relation Age of Onset   • Lymphoma Father         CLL   • Hypothyroidism Father    • Colon polyps Father    • Breast cancer Mother 59   • Bipolar disorder Mother         type 1   • Diabetes Mother         type 2   • Skin cancer Sister    • Melanoma Sister    • Coronary artery disease Brother 53   • Drug abuse Brother    • Heart failure Maternal Grandmother    • Coronary artery disease Maternal Grandmother    • Bipolar disorder Maternal Grandmother    • Coronary artery  disease Maternal Grandfather    • Diabetes Paternal Grandmother    • Prostate cancer Paternal Grandfather    • Graves' disease Paternal Aunt    • Abnormal EKG Paternal Aunt    • Alcohol abuse Maternal Uncle    • Drug abuse Maternal Uncle    • Colon cancer Neg Hx            **Kellieon Disclaimer:   Much of this encounter note is an electronic transcription/translation of spoken language to printed text. The electronic translation of spoken language may permit erroneous, or at times, nonsensical words or phrases to be inadvertently transcribed. Although I have reviewed the note for such errors, some may still exist.

## 2018-09-15 DIAGNOSIS — F41.3 OTHER MIXED ANXIETY DISORDERS: Chronic | ICD-10-CM

## 2018-09-17 RX ORDER — CITALOPRAM 10 MG/1
TABLET ORAL
Qty: 14 TABLET | Refills: 0 | OUTPATIENT
Start: 2018-09-17

## 2018-10-08 ENCOUNTER — TELEPHONE (OUTPATIENT)
Dept: INTERNAL MEDICINE | Age: 43
End: 2018-10-08

## 2018-10-08 DIAGNOSIS — F41.3 OTHER MIXED ANXIETY DISORDERS: Chronic | ICD-10-CM

## 2018-10-08 RX ORDER — CITALOPRAM 10 MG/1
TABLET ORAL
Qty: 30 TABLET | Refills: 1 | Status: SHIPPED | OUTPATIENT
Start: 2018-10-08 | End: 2018-10-15 | Stop reason: SDUPTHER

## 2018-10-08 NOTE — TELEPHONE ENCOUNTER
On DOS 8/21/18, you instructed pt to titrate off Citalopram 10mg; however pt is requesting to restart the medication due to highly stressed & a lot of anxiety in her life right now.    Pls advise.    [facility] [employer] # [phone].    Pt can be reached #[phone].

## 2018-10-08 NOTE — TELEPHONE ENCOUNTER
She may resume medication if needed but she needs to see me within 1-2 months of restarting medication. Give her enough refill for then.     (REMINDER: Update med list, maintain dx association, and update change on CURRENT ASSESSMENT/PLAN)

## 2018-10-13 ENCOUNTER — PATIENT MESSAGE (OUTPATIENT)
Dept: INTERNAL MEDICINE | Age: 43
End: 2018-10-13

## 2018-10-15 ENCOUNTER — TELEPHONE (OUTPATIENT)
Dept: INTERNAL MEDICINE | Age: 43
End: 2018-10-15

## 2018-10-15 DIAGNOSIS — F41.3 OTHER MIXED ANXIETY DISORDERS: Chronic | ICD-10-CM

## 2018-10-15 RX ORDER — CITALOPRAM 10 MG/1
TABLET ORAL
Qty: 30 TABLET | Refills: 3 | Status: SHIPPED | OUTPATIENT
Start: 2018-10-15 | End: 2018-12-12 | Stop reason: SDUPTHER

## 2018-10-15 NOTE — TELEPHONE ENCOUNTER
----- Message from Contreras Gonzalez MA sent at 10/13/2018  4:32 PM EDT -----  Regarding: FW: Complaint  Contact: [phone]      ----- Message -----  From: Kaia Mack  Sent: 10/13/2018   4:29 PM  To: [facility]  Subject: Complaint                                        I called Monday, Oct. 8 to request to be put back on a medication I had previously requested to be weaned off of, as I had been told by Dr. Bonilla that I could do if I wasn't doing well. I was disappointed in how my request was handled by the staff person who spoke with me on that call. She was rather short with me and lacked compassion; I felt like I needed to explain that the medication I was requesting was not a narcotic, but an anti-anxiety pill. The call was frustrating and upsetting in an already stressful time for me.    Another staff member left me a much kinder message to let me know my prescription had been sent to my pharmacy, and I appreciated that. Staff on the front end of service should show more compassion.    Kaia Mack

## 2018-10-15 NOTE — TELEPHONE ENCOUNTER
Call or send her REPP message and do the following:    Since we are restarting the mediation, starting at 10 mg is the best option.     (Make sure she has enough refills until she returns to see me on Dec 11)

## 2018-10-15 NOTE — TELEPHONE ENCOUNTER
----- Message from Contreras Gonzalez MA sent at 10/13/2018  4:31 PM EDT -----  Regarding: FW: Prescription Question  Contact: [phone]      ----- Message -----  From: Kaia Mack  Sent: 10/13/2018   4:19 PM  To: [facility]  Subject: Prescription Question                            Dr. Bonilla,    Thank you for renewing my prescription to citalopram this past week. Weaning off it didn't go as well as I had hoped. I did want to ask a couple of questions, though. First, the prescription filled was for 10 mg, which was the half dose I used when weaning off it - my previous strength was 20 mg. Did you want to see whether I could manage long term on the half dose first, or was that in error?    Second, you requested that I see you in two months in order to reassess before writing further prescriptions. The only appointment I could get was just beyond that two-month window - Dec. 11. There will be a gap of a few days between my last pill and my appointment. Will that cause withdrawal?    Kaia Mack

## 2018-12-12 ENCOUNTER — OFFICE VISIT (OUTPATIENT)
Dept: INTERNAL MEDICINE | Age: 43
End: 2018-12-12

## 2018-12-12 VITALS
HEIGHT: 64 IN | HEART RATE: 60 BPM | DIASTOLIC BLOOD PRESSURE: 60 MMHG | SYSTOLIC BLOOD PRESSURE: 110 MMHG | OXYGEN SATURATION: 98 % | TEMPERATURE: 98.2 F | WEIGHT: 117 LBS | BODY MASS INDEX: 19.97 KG/M2

## 2018-12-12 DIAGNOSIS — E03.9 HYPOTHYROIDISM, UNSPECIFIED TYPE: Chronic | ICD-10-CM

## 2018-12-12 DIAGNOSIS — F41.3 OTHER MIXED ANXIETY DISORDERS: Primary | Chronic | ICD-10-CM

## 2018-12-12 PROCEDURE — 99214 OFFICE O/P EST MOD 30 MIN: CPT | Performed by: INTERNAL MEDICINE

## 2018-12-12 RX ORDER — CITALOPRAM 20 MG/1
TABLET ORAL
Qty: 90 TABLET | Refills: 1 | Status: SHIPPED | OUTPATIENT
Start: 2018-12-12 | End: 2019-06-06 | Stop reason: SDUPTHER

## 2018-12-12 NOTE — PROGRESS NOTES
[facility] INTERNAL MEDICINE  JEWEL WAYNE M.D.      Kaia Mack / 43 y.o. / female  2018      ASSESSMENT & PLAN:    Problem List Items Addressed This Visit        High    Other mixed anxiety disorders - Primary (Chronic)    Current Assessment & Plan     Increase citalopram to 20 mg qd. Recommended reducing caffeine and avoiding decongestants due to anxiety.     Follow-up in 4 months.          Relevant Medications    citalopram (CeleXA) 20 MG tablet       Low    Hypothyroidism (Chronic)    Overview     *Veeneman  Continue current dose of levothyroxine and f/u with endo.          Relevant Medications    levothyroxine (SYNTHROID, LEVOTHROID) 88 MCG tablet        No orders of the defined types were placed in this encounter.    New Medications Ordered This Visit   Medications   • citalopram (CeleXA) 20 MG tablet     Si tab daily     Dispense:  90 tablet     Refill:  1       Summary/Discussion:  ·     Return in about 4 months (around 2019) for Depression, Anxiety.    ____________________________________________________________________    MEDICATIONS  Current Outpatient Medications on File Prior to Visit   Medication Sig   • acetaminophen (TYLENOL) 500 MG tablet Take 500 mg by mouth Every 6 (Six) Hours As Needed for mild pain (1-3).   • Calcium Carb-Cholecalciferol (CALCIUM + D3 PO) Take 1 tablet by mouth daily.   • cetirizine (ZyrTEC) 10 MG tablet Take 10 mg by mouth every morning.   • Cholecalciferol (VITAMIN D) 1000 UNITS tablet Take 1,000 Units by mouth Daily.   • fluticasone (FLONASE) 50 MCG/ACT nasal spray 1 spray into each nostril 2 (Two) Times a Day.   • levothyroxine (SYNTHROID, LEVOTHROID) 88 MCG tablet Take 88 mcg by mouth Daily.   • Probiotic Product (PROBIOTIC DAILY PO) Take  by mouth.   • Turmeric 500 MG capsule Take 1 capsule by mouth Daily.   • [DISCONTINUED] citalopram (CeleXA) 10 MG tablet 1 tab daily     No current facility-administered medications on file prior to visit.      "      VITALS:    Visit Vitals  /60   Pulse 60   Temp 98.2 °F (36.8 °C)   Ht 161.3 cm (63.5\")   Wt 53.1 kg (117 lb)   SpO2 98%   BMI 20.40 kg/m²       BP Readings from Last 3 Encounters:   12/12/18 110/60   08/21/18 106/70   02/02/18 102/72     Wt Readings from Last 3 Encounters:   12/12/18 53.1 kg (117 lb)   08/21/18 54.4 kg (120 lb)   02/02/18 54.9 kg (121 lb)      Body mass index is 20.4 kg/m².    CC:  Main reason(s) for today's visit: Anxiety      HPI:     Complains of feeling inner tension, anxeity. On citalopram 10 mg which was resumed in October and which has helped. Denies depression symptoms. She is concerned about her family history of bipolar disorder but she has never experience thai or major depression. 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 She drinks about 3 caffeine beverages a day.     Hypothyroidism is managed by her endocrinologist.     Patient Care Team:  Mason Bonilla MD as PCP - General (Internal Medicine)  Nova Guevara MD as Consulting Physician (Endocrinology)  Yael Carson MD (Dermatology)  Ping Cisneros MD as Consulting Physician (Obstetrics and Gynecology)    ____________________________________________________________________    REVIEW OF SYSTEMS    Review of Systems  Constitutional neg  Resp neg  CV neg  Psych neg for depression or sleeping problems.      PHYSICAL EXAMINATION    Physical Exam  Constitutional  No distress  Psychiatric  Alert. Judgment and thought content normal. Mood mildly anxious. Not depressed.     REVIEWED DATA:    Labs:     Lab Results   Component Value Date     07/22/2017    K 4.9 07/22/2017    AST 10 07/21/2017    ALT 11 07/21/2017    BUN 11 07/22/2017    CREATININE 0.68 07/22/2017    CREATININE 0.66 07/21/2017    CREATININE 0.71 09/16/2016    EGFRIFNONA 96 07/22/2017    EGFRIFAFRI  09/16/2016      Comment:      <15 Indicative of kidney failure.       Lab Results   Component Value Date "    GLUCOSE 112 (H) 07/22/2017    GLUCOSE 101 (H) 07/21/2017    GLUCOSE 81 09/16/2016       No results found for: LDL, HDL, TRIG, CHOLHDLRATIO    No results found for: TSH, FREET4    Lab Results   Component Value Date    WBC 9.95 07/22/2017    HGB 11.5 (L) 07/22/2017    HGB 14.7 07/21/2017    HGB 10.1 (L) 09/29/2016     07/22/2017       Lab Results   Component Value Date    GLUCOSEU Negative 07/21/2017    BLOODU Negative 07/21/2017    NITRITEU Negative 07/21/2017    LEUKOCYTESUR Small (1+) (A) 07/21/2017       Imaging:         Medical Tests:         Summary of old records / correspondence / consultant report:         Request outside records:         ALLERGIES  No Known Allergies     PFSH:     The following portions of the patient's history were reviewed and updated as appropriate: Allergies / Current Medications / Past Medical History / Surgical History / Social History / Family History    PROBLEM LIST   Patient Active Problem List   Diagnosis   • Insomnia   • Iron deficiency anemia   • Vitamin D deficiency   • Hypothyroidism   • Pituitary adenoma (CMS/HCC)   • Other mixed anxiety disorders   • Chronic allergic rhinitis   • TMJ (temporomandibular joint disorder)   • Other irritable bowel syndrome       PAST MEDICAL HISTORY  Past Medical History:   Diagnosis Date   • Acute appendicitis with localized peritonitis 7/21/2017   • Anxiety    • Asthma    • Hypothyroidism    • Lumbar canal stenosis 8/16/2016   • Migraine     RARE   • Pituitary adenoma (CMS/HCC)     benign   • Spinal stenosis    • Spondylolisthesis of lumbar region 8/16/2016       SURGICAL HISTORY  Past Surgical History:   Procedure Laterality Date   • ABDOMINOPLASTY N/A 02/12/2016    Dr. Isidro Nguyen   • BREAST AUGMENTATION Bilateral 02/12/2016    Dr. Isidro Nguyen   • DENTAL EXAMINATION UNDER ANESTHESIA     • WISDOM TOOTH EXTRACTION Bilateral        SOCIAL HISTORY  Social History     Socioeconomic History   • Marital status:      Spouse name:  Fidel   • Number of children: 3   • Years of education: Bachelors   • Highest education level: Not on file   Occupational History   • Occupation: Homemaker   Tobacco Use   • Smoking status: Never Smoker   • Smokeless tobacco: Never Used   Substance and Sexual Activity   • Alcohol use: Yes     Alcohol/week: 1.8 - 3.0 oz     Types: 3 - 5 Glasses of wine per week   • Drug use: No   • Sexual activity: Yes     Partners: Male     Comment:  had vasectomy       FAMILY HISTORY  Family History   Problem Relation Age of Onset   • Lymphoma Father         CLL   • Hypothyroidism Father    • Colon polyps Father    • Breast cancer Mother 59   • Bipolar disorder Mother         type 1   • Diabetes Mother         type 2   • Skin cancer Sister    • Melanoma Sister    • Coronary artery disease Brother 53   • Drug abuse Brother    • Heart failure Maternal Grandmother    • Coronary artery disease Maternal Grandmother    • Bipolar disorder Maternal Grandmother    • Coronary artery disease Maternal Grandfather    • Diabetes Paternal Grandmother    • Prostate cancer Paternal Grandfather    • Graves' disease Paternal Aunt    • Abnormal EKG Paternal Aunt    • Alcohol abuse Maternal Uncle    • Drug abuse Maternal Uncle    • Colon cancer Neg Hx          **Dragon Disclaimer:   Much of this encounter note is an electronic transcription/translation of spoken language to printed text. The electronic translation of spoken language may permit erroneous, or at times, nonsensical words or phrases to be inadvertently transcribed. Although I have reviewed the note for such errors, some may still exist.

## 2018-12-12 NOTE — ASSESSMENT & PLAN NOTE
Increase citalopram to 20 mg qd. Recommended reducing caffeine and avoiding decongestants due to anxiety.     Follow-up in 4 months.

## 2019-03-23 ENCOUNTER — TELEPHONE (OUTPATIENT)
Dept: INTERNAL MEDICINE | Age: 44
End: 2019-03-23

## 2019-03-25 NOTE — TELEPHONE ENCOUNTER
"Patient called answering service with c/o \"feeling like passed kidney stone\", urinary frequency, \"tissue in urine\". Recommended patient needed to be evaluated at urgent care over the weekend for possible need for antibiotics.   "

## 2019-04-12 ENCOUNTER — OFFICE VISIT (OUTPATIENT)
Dept: INTERNAL MEDICINE | Age: 44
End: 2019-04-12

## 2019-04-12 VITALS
SYSTOLIC BLOOD PRESSURE: 108 MMHG | BODY MASS INDEX: 19.46 KG/M2 | DIASTOLIC BLOOD PRESSURE: 62 MMHG | HEART RATE: 66 BPM | OXYGEN SATURATION: 98 % | WEIGHT: 114 LBS | TEMPERATURE: 98.5 F | HEIGHT: 64 IN

## 2019-04-12 DIAGNOSIS — F41.3 OTHER MIXED ANXIETY DISORDERS: Primary | Chronic | ICD-10-CM

## 2019-04-12 DIAGNOSIS — E03.9 HYPOTHYROIDISM, UNSPECIFIED TYPE: Chronic | ICD-10-CM

## 2019-04-12 DIAGNOSIS — D35.2 PITUITARY ADENOMA (HCC): Chronic | ICD-10-CM

## 2019-04-12 PROCEDURE — 99213 OFFICE O/P EST LOW 20 MIN: CPT | Performed by: INTERNAL MEDICINE

## 2019-04-12 NOTE — PROGRESS NOTES
"[facility] [facility]  JEWEL WAYNE M.D.      Kaia Mack / 43 y.o. / female  04/12/2019      ASSESSMENT & PLAN:    Problem List Items Addressed This Visit        High    Other mixed anxiety disorders - Primary (Chronic)    Overview     Stable. Continue citalopram 20 mg daily.          Relevant Medications    citalopram (CeleXA) 20 MG tablet       Low    Hypothyroidism (Chronic)    Overview     *Veeneman  Continue current dose of levothyroxine and f/u with endo.          Relevant Medications    levothyroxine (SYNTHROID, LEVOTHROID) 88 MCG tablet    Pituitary adenoma (CMS/HCC) (Chronic)    Overview     *Veenamen  Continue monitoring by endo.              No orders of the defined types were placed in this encounter.    No orders of the defined types were placed in this encounter.      Summary/Discussion:  ·     Return in about 6 months (around 10/12/2019) for Depression.    ____________________________________________________________________    MEDICATIONS  Current Outpatient Medications   Medication Sig Dispense Refill   • Calcium Carb-Cholecalciferol (CALCIUM + D3 PO) Take 1 tablet by mouth daily.     • cetirizine (ZyrTEC) 10 MG tablet Take 10 mg by mouth every morning.     • Cholecalciferol (VITAMIN D) 1000 UNITS tablet Take 1,000 Units by mouth Daily.     • citalopram (CeleXA) 20 MG tablet 1 tab daily 90 tablet 1   • levothyroxine (SYNTHROID, LEVOTHROID) 88 MCG tablet Take 88 mcg by mouth Daily.     • Probiotic Product (PROBIOTIC DAILY PO) Take  by mouth.     • Turmeric 500 MG capsule Take 1 capsule by mouth Daily.     • fluticasone (FLONASE) 50 MCG/ACT nasal spray 1 spray into each nostril 2 (Two) Times a Day. 1 bottle 5     No current facility-administered medications for this visit.           VITALS:    Visit Vitals  /62   Pulse 66   Temp 98.5 °F (36.9 °C)   Ht 161.3 cm (63.5\")   Wt 51.7 kg (114 lb)   SpO2 98%   BMI 19.87 kg/m²       BP Readings from Last 3 Encounters:   04/12/19 108/62   12/12/18 " "110/60   08/21/18 106/70     Wt Readings from Last 3 Encounters:   04/12/19 51.7 kg (114 lb)   12/12/18 53.1 kg (117 lb)   08/21/18 54.4 kg (120 lb)      Body mass index is 19.87 kg/m².    CC:  Main reason(s) for today's visit: Depression and Anxiety      HPI:     Anxiety disorder: doing better with increased dose of citalopram 20 mg without significant side effects. Not depressed.     Menstrual cycles have been irregular. She is followed by endocrinologist for hypothyroidism (pituitary adenoma) and sees gynecologist.     Had a \"UTI\" for the first time recently and was treated at a clinic with Macrobid. Denies ongoing symptoms.     Patient Care Team:  Mason Bonilla MD as PCP - General (Internal Medicine)  Nova Guevara MD as Consulting Physician (Endocrinology)  Yael Carson MD (Dermatology)  Ping Cisneros MD as Consulting Physician (Obstetrics and Gynecology)    ____________________________________________________________________    REVIEW OF SYSTEMS    Review of Systems  Constitutional neg  GI stable IBS   no dysuria  Endo irregular menses   Psych improved anxiety, not depressed     PHYSICAL EXAMINATION    Physical Exam  Constitutional  No distress  Psychiatric  Alert. Judgment and thought content normal. Mood normal    REVIEWED DATA:    Labs:     Lab Results   Component Value Date     07/22/2017    K 4.9 07/22/2017    CALCIUM 8.1 (L) 07/22/2017    AST 10 07/21/2017    ALT 11 07/21/2017    BUN 11 07/22/2017    CREATININE 0.68 07/22/2017    CREATININE 0.66 07/21/2017    CREATININE 0.71 09/16/2016    EGFRIFNONA 96 07/22/2017    EGFRIFAFRI  09/16/2016      Comment:      <15 Indicative of kidney failure.       Lab Results   Component Value Date    GLUCOSE 112 (H) 07/22/2017    GLUCOSE 101 (H) 07/21/2017    GLUCOSE 81 09/16/2016       No results found for: LDL, HDL, TRIG, CHOLHDLRATIO    No results found for: TSH, FREET4    Lab Results   Component Value Date    WBC 9.95 07/22/2017    HGB 11.5 (L) " 07/22/2017    HGB 14.7 07/21/2017    HGB 10.1 (L) 09/29/2016     07/22/2017       Lab Results   Component Value Date    GLUCOSEU Negative 07/21/2017    BLOODU Negative 07/21/2017    NITRITEU Negative 07/21/2017    LEUKOCYTESUR Small (1+) (A) 07/21/2017       Imaging:         Medical Tests:         Summary of old records / correspondence / consultant report:         Request outside records:         ALLERGIES  No Known Allergies     PFSH:     The following portions of the patient's history were reviewed and updated as appropriate: Allergies / Current Medications / Past Medical History / Surgical History / Social History / Family History    PROBLEM LIST   Patient Active Problem List   Diagnosis   • Insomnia   • Iron deficiency anemia   • Vitamin D deficiency   • Hypothyroidism   • Pituitary adenoma (CMS/HCC)   • Other mixed anxiety disorders   • Chronic allergic rhinitis   • TMJ (temporomandibular joint disorder)   • Other irritable bowel syndrome       PAST MEDICAL HISTORY  Past Medical History:   Diagnosis Date   • Acute appendicitis with localized peritonitis 7/21/2017   • Anxiety    • Asthma    • Depression    • Hypothyroidism    • Lumbar canal stenosis 8/16/2016   • Migraine     RARE   • Pituitary adenoma (CMS/HCC)     benign   • Spinal stenosis    • Spondylolisthesis of lumbar region 8/16/2016       SURGICAL HISTORY  Past Surgical History:   Procedure Laterality Date   • ABDOMINOPLASTY N/A 02/12/2016    Dr. Isidro Nguyen   • APPENDECTOMY N/A 7/21/2017    Procedure: APPENDECTOMY LAPAROSCOPIC;  Surgeon: Leticia Elizabeth MD;  Location: [facility];  Service:    • BREAST AUGMENTATION Bilateral 02/12/2016    Dr. Isidro Nguyen   • DENTAL EXAMINATION UNDER ANESTHESIA     • LUMBAR DISCECTOMY FUSION INSTRUMENTATION N/A 9/26/2016    Procedure: L4-5  fusion with instrumentation;  Surgeon: Isidro Dietz MD;  Location: [facility] OR;  Service:    • LUMBAR LAMINECTOMY DISCECTOMY DECOMPRESSION N/A 9/26/2016     Procedure: L4-L5 LUMBAR LAMINECTOMY;  Surgeon: Darrell Olvera MD;  Location: [facility] OR;  Service:    • SCAR REVISION BREAST Bilateral 4/29/2016    Procedure: REVISION GABBY BREAST, REMOVE AND REPLACE RIGHT BREAST IMPLANT;  Surgeon: Isidro Nguyen MD;  Location: [facility];  Service:    • WISDOM TOOTH EXTRACTION Bilateral        SOCIAL HISTORY  Social History     Socioeconomic History   • Marital status:      Spouse name: Fidel   • Number of children: 3   • Years of education: Bachelors   • Highest education level: Not on file   Occupational History   • Occupation: Homemaker   Tobacco Use   • Smoking status: Never Smoker   • Smokeless tobacco: Never Used   Substance and Sexual Activity   • Alcohol use: Yes     Alcohol/week: 1.8 - 3.0 oz     Types: 3 - 5 Glasses of wine per week   • Drug use: No   • Sexual activity: Yes     Partners: Male     Comment:  had vasectomy       FAMILY HISTORY  Family History   Problem Relation Age of Onset   • Lymphoma Father         CLL   • Hypothyroidism Father    • Colon polyps Father    • Breast cancer Mother 59   • Bipolar disorder Mother         type 1   • Diabetes Mother         type 2   • Skin cancer Sister    • Melanoma Sister    • Coronary artery disease Brother 53   • Drug abuse Brother    • Heart failure Maternal Grandmother    • Coronary artery disease Maternal Grandmother    • Bipolar disorder Maternal Grandmother    • Coronary artery disease Maternal Grandfather    • Diabetes Paternal Grandmother    • Prostate cancer Paternal Grandfather    • Graves' disease Paternal Aunt    • Abnormal EKG Paternal Aunt    • Alcohol abuse Maternal Uncle    • Drug abuse Maternal Uncle    • Colon cancer Neg Hx          **Dragon Disclaimer:   Much of this encounter note is an electronic transcription/translation of spoken language to printed text. The electronic translation of spoken language may permit erroneous, or at times, nonsensical words or phrases to be  inadvertently transcribed. Although I have reviewed the note for such errors, some may still exist.       Template created by Sukhi Bonilla MD

## 2019-06-06 DIAGNOSIS — F41.3 OTHER MIXED ANXIETY DISORDERS: Chronic | ICD-10-CM

## 2019-06-06 RX ORDER — CITALOPRAM 20 MG/1
TABLET ORAL
Qty: 90 TABLET | Refills: 1 | Status: SHIPPED | OUTPATIENT
Start: 2019-06-06 | End: 2019-12-05 | Stop reason: SDUPTHER

## 2019-08-16 ENCOUNTER — OFFICE VISIT (OUTPATIENT)
Dept: INTERNAL MEDICINE | Age: 44
End: 2019-08-16

## 2019-08-16 VITALS
WEIGHT: 116 LBS | OXYGEN SATURATION: 100 % | TEMPERATURE: 97 F | BODY MASS INDEX: 19.81 KG/M2 | HEIGHT: 64 IN | HEART RATE: 67 BPM

## 2019-08-16 DIAGNOSIS — N30.00 ACUTE CYSTITIS WITHOUT HEMATURIA: Primary | ICD-10-CM

## 2019-08-16 DIAGNOSIS — R33.9 UNABLE TO EMPTY BLADDER: ICD-10-CM

## 2019-08-16 LAB
BILIRUB BLD-MCNC: NEGATIVE MG/DL
CLARITY, POC: CLEAR
COLOR UR: YELLOW
GLUCOSE UR STRIP-MCNC: NEGATIVE MG/DL
KETONES UR QL: NEGATIVE
LEUKOCYTE EST, POC: ABNORMAL
NITRITE UR-MCNC: NEGATIVE MG/ML
PH UR: 7 [PH] (ref 5–8)
PROT UR STRIP-MCNC: NEGATIVE MG/DL
RBC # UR STRIP: NEGATIVE /UL
SP GR UR: 1.01 (ref 1–1.03)
UROBILINOGEN UR QL: NORMAL

## 2019-08-16 PROCEDURE — 99214 OFFICE O/P EST MOD 30 MIN: CPT | Performed by: NURSE PRACTITIONER

## 2019-08-16 PROCEDURE — 81003 URINALYSIS AUTO W/O SCOPE: CPT | Performed by: NURSE PRACTITIONER

## 2019-08-16 RX ORDER — NITROFURANTOIN 25; 75 MG/1; MG/1
100 CAPSULE ORAL 2 TIMES DAILY
Qty: 10 CAPSULE | Refills: 0 | Status: SHIPPED | OUTPATIENT
Start: 2019-08-16 | End: 2019-08-21

## 2019-08-16 RX ORDER — NITROFURANTOIN 25; 75 MG/1; MG/1
100 CAPSULE ORAL 2 TIMES DAILY
Qty: 10 CAPSULE | Refills: 0 | Status: SHIPPED | OUTPATIENT
Start: 2019-08-16 | End: 2019-08-16

## 2019-08-16 NOTE — PROGRESS NOTES
"Kaia Mack / 44 y.o. / female  Encounter Date: 08/16/2019    ASSESSMENT & PLAN:    Problem List Items Addressed This Visit     None      Visit Diagnoses     Acute cystitis without hematuria    -  Primary    Relevant Medications    nitrofurantoin, macrocrystal-monohydrate, (MACROBID) 100 MG capsule    Unable to empty bladder        Relevant Orders    POC Urinalysis Dipstick, Automated (Completed)    Urine Culture - Urine, Urine, Clean Catch        Orders Placed This Encounter   Procedures   • Urine Culture - Urine, Urine, Clean Catch   • POC Urinalysis Dipstick, Automated     New Medications Ordered This Visit   Medications   • nitrofurantoin, macrocrystal-monohydrate, (MACROBID) 100 MG capsule     Sig: Take 1 capsule by mouth 2 (Two) Times a Day for 5 days.     Dispense:  10 capsule     Refill:  0       Summary/Discussion:  1. Treatment per orders - also push fluids, may use Pyridium OTC prn. Call or return to clinic prn if these symptoms worsen or fail to improve as anticipated.    Return if symptoms worsen or fail to improve, for Next scheduled follow up.  ________________________________________________________________    VITALS:    Visit Vitals  Pulse 67   Temp 97 °F (36.1 °C) (Temporal)   Ht 161.3 cm (63.5\")   Wt 52.6 kg (116 lb)   SpO2 100%   BMI 20.22 kg/m²       BP Readings from Last 3 Encounters:   04/12/19 108/62   12/12/18 110/60   08/21/18 106/70     Wt Readings from Last 3 Encounters:   08/16/19 52.6 kg (116 lb)   04/12/19 51.7 kg (114 lb)   12/12/18 53.1 kg (117 lb)      Body mass index is 20.22 kg/m².    CC: Main reason(s) for today's visit: Bladder Problem      HPI    Patient is a 44 y.o. female who is here for urinary complaints.  She is a new patient to me.    Concerns today include: Malodorous urine and dysuria x3 weeks.  She has a history of nonobstructive kidney stone, with suspected passing of kidney stone 4 to 5 months ago when she had bladder discomfort and hematuria x1 event, with " relief afterwards.  Today she denies flank pain, fever, chills, abnormal vaginal discharge or bleeding.  She appears well, NAD, vital signs normal, no CVA tenderness, urine dipstick shows: Leukocytes.    Patient Care Team:  Mason Bonilla MD as PCP - General (Internal Medicine)  Nova Guevara MD as Consulting Physician (Endocrinology)  Yael Carson MD (Dermatology)  Ping Cisneros MD as Consulting Physician (Obstetrics and Gynecology)  ____________________________________________________________________    REVIEW OF SYSTEMS  PER HPI NOTE  Review of Systems   Constitutional: Negative.    Respiratory: Negative.    Cardiovascular: Negative.    Musculoskeletal: Negative.    Neurological: Negative.        PHYSICAL EXAMINATION    Physical Exam   Constitutional: She is oriented to person, place, and time. She appears well-developed and well-nourished.   Pulmonary/Chest: Effort normal.   Genitourinary: No vaginal discharge found.   Neurological: She is alert and oriented to person, place, and time.   Skin: Skin is warm and dry.   Psychiatric: She has a normal mood and affect.   Vitals reviewed.    REVIEWED DATA:    Labs:   Lab Results   Component Value Date     07/22/2017    K 4.9 07/22/2017    AST 10 07/21/2017    ALT 11 07/21/2017    BUN 11 07/22/2017    CREATININE 0.68 07/22/2017    CREATININE 0.66 07/21/2017    CREATININE 0.71 09/16/2016    EGFRIFNONA 96 07/22/2017    EGFRIFAFRI  09/16/2016      Comment:      <15 Indicative of kidney failure.       Lab Results   Component Value Date    GLUCOSE 112 (H) 07/22/2017    GLUCOSE 101 (H) 07/21/2017    GLUCOSE 81 09/16/2016       No results found for: LDL, HDL, TRIG, CHOLHDLRATIO    Lab Results   Component Value Date    TSH 0.129 (L) 04/16/2018    FREET4 1.54 04/17/2019          Lab Results   Component Value Date    WBC 9.95 07/22/2017    HGB 11.5 (L) 07/22/2017    HGB 14.7 07/21/2017    HGB 10.1 (L) 09/29/2016     07/22/2017         Imaging:       Medical Tests:      Summary of old records / correspondence / consultant report:      Request outside records:   ______________________________________________________________________    ALLERGIES  No Known Allergies     MEDICATIONS  Current Outpatient Medications on File Prior to Visit   Medication Sig   • Calcium Carb-Cholecalciferol (CALCIUM + D3 PO) Take 1 tablet by mouth daily.   • cetirizine (ZyrTEC) 10 MG tablet Take 10 mg by mouth every morning.   • Cholecalciferol (VITAMIN D) 1000 UNITS tablet Take 1,000 Units by mouth Daily.   • citalopram (CeleXA) 20 MG tablet TAKE 1 TABLET BY MOUTH EVERY DAY   • fluticasone (FLONASE) 50 MCG/ACT nasal spray 1 spray into each nostril 2 (Two) Times a Day.   • levothyroxine (SYNTHROID, LEVOTHROID) 88 MCG tablet Take 88 mcg by mouth Daily.   • Probiotic Product (PROBIOTIC DAILY PO) Take  by mouth.   • Turmeric 500 MG capsule Take 1 capsule by mouth Daily.     No current facility-administered medications on file prior to visit.        PFSH:     The following portions of the patient's history were reviewed and updated as appropriate: Allergies / Current Medications / Past Medical History / Surgical History / Social History / Family History    PROBLEM LIST   Patient Active Problem List   Diagnosis   • Insomnia   • Iron deficiency anemia   • Vitamin D deficiency   • Hypothyroidism   • Pituitary adenoma (CMS/HCC)   • Other mixed anxiety disorders   • Chronic allergic rhinitis   • TMJ (temporomandibular joint disorder)   • Other irritable bowel syndrome       PAST MEDICAL HISTORY  Past Medical History:   Diagnosis Date   • Acute appendicitis with localized peritonitis 7/21/2017   • Anxiety    • Asthma    • Depression    • Hypothyroidism    • Lumbar canal stenosis 8/16/2016   • Migraine     RARE   • Pituitary adenoma (CMS/HCC)     benign   • Spinal stenosis    • Spondylolisthesis of lumbar region 8/16/2016       SURGICAL HISTORY  Past Surgical History:   Procedure Laterality  Date   • ABDOMINOPLASTY N/A 02/12/2016    Dr. Isidro Nguyen   • APPENDECTOMY N/A 7/21/2017    Procedure: APPENDECTOMY LAPAROSCOPIC;  Surgeon: Leticia Elizabeth MD;  Location: [facility] OR;  Service:    • BREAST AUGMENTATION Bilateral 02/12/2016    Dr. Isidro Nguyen   • DENTAL EXAMINATION UNDER ANESTHESIA     • LUMBAR DISCECTOMY FUSION INSTRUMENTATION N/A 9/26/2016    Procedure: L4-5  fusion with instrumentation;  Surgeon: Isidro Dietz MD;  Location: [facility] OR;  Service:    • LUMBAR LAMINECTOMY DISCECTOMY DECOMPRESSION N/A 9/26/2016    Procedure: L4-L5 LUMBAR LAMINECTOMY;  Surgeon: Darrell Olvera MD;  Location: [facility] OR;  Service:    • SCAR REVISION BREAST Bilateral 4/29/2016    Procedure: REVISION GABBY BREAST, REMOVE AND REPLACE RIGHT BREAST IMPLANT;  Surgeon: Isidro Nguyen MD;  Location: [facility] OR;  Service:    • WISDOM TOOTH EXTRACTION Bilateral        SOCIAL HISTORY  Social History     Socioeconomic History   • Marital status:      Spouse name: Fidel   • Number of children: 3   • Years of education: Bachelors   • Highest education level: Not on file   Occupational History   • Occupation: Homemaker   Tobacco Use   • Smoking status: Never Smoker   • Smokeless tobacco: Never Used   Substance and Sexual Activity   • Alcohol use: Yes     Alcohol/week: 1.8 - 3.0 oz     Types: 3 - 5 Glasses of wine per week   • Drug use: No   • Sexual activity: Yes     Partners: Male     Comment:  had vasectomy       FAMILY HISTORY  Family History   Problem Relation Age of Onset   • Lymphoma Father         CLL   • Hypothyroidism Father    • Colon polyps Father    • Breast cancer Mother 59   • Bipolar disorder Mother         type 1   • Diabetes Mother         type 2   • Skin cancer Sister    • Melanoma Sister    • Coronary artery disease Brother 53   • Drug abuse Brother    • Heart failure Maternal Grandmother    • Coronary artery disease Maternal Grandmother    • Bipolar disorder Maternal  Grandmother    • Coronary artery disease Maternal Grandfather    • Diabetes Paternal Grandmother    • Prostate cancer Paternal Grandfather    • Graves' disease Paternal Aunt    • Abnormal EKG Paternal Aunt    • Alcohol abuse Maternal Uncle    • Drug abuse Maternal Uncle    • Colon cancer Neg Hx

## 2019-08-18 LAB
BACTERIA UR CULT: NORMAL
BACTERIA UR CULT: NORMAL

## 2019-09-20 ENCOUNTER — OFFICE VISIT (OUTPATIENT)
Dept: ORTHOPEDIC SURGERY | Facility: CLINIC | Age: 44
End: 2019-09-20

## 2019-09-20 DIAGNOSIS — M54.31 SCIATICA OF RIGHT SIDE: ICD-10-CM

## 2019-09-20 DIAGNOSIS — Z98.1 S/P LUMBAR FUSION: Primary | ICD-10-CM

## 2019-09-20 PROCEDURE — 72100 X-RAY EXAM L-S SPINE 2/3 VWS: CPT | Performed by: ORTHOPAEDIC SURGERY

## 2019-09-20 PROCEDURE — 99213 OFFICE O/P EST LOW 20 MIN: CPT | Performed by: ORTHOPAEDIC SURGERY

## 2019-09-20 NOTE — PROGRESS NOTES
She complains of lumbar back pain but mostly right buttock pain ongoing several months usually annoying but sometimes moderate to severe.  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  She is had a couple of urinary tract infections this year and has a history of irritable bowel but no new bowel symptoms.  No balance difficulties.  Her surgery was September 2016 at which time L4-5 laminectomy and fusion with instrumentation was performed.  On exam excellent strength in lower extremities reflexes and sensation are intact.  Straight leg raise test is negative.  Plain film x-rays suggest a solid L4-5 fusion some slight kyphosis at L5-S1, improved scoliosis since the surgery, and some slight deterioration of the L3-4 disc space posteriorly above compared to prior films.  For now she is going to do physical therapy consider chiropractic and if she fails to improve we will get an MRI scan of the lumbar spine with gadolinium.

## 2019-10-04 ENCOUNTER — TREATMENT (OUTPATIENT)
Dept: PHYSICAL THERAPY | Facility: CLINIC | Age: 44
End: 2019-10-04

## 2019-10-04 DIAGNOSIS — Z98.1 STATUS POST LUMBAR SPINAL FUSION: Primary | ICD-10-CM

## 2019-10-04 DIAGNOSIS — M54.31 RIGHT SIDED SCIATICA: ICD-10-CM

## 2019-10-04 PROCEDURE — DRYNDL PR CUSTOM DRY NEEDLING SELF PAY: Performed by: PHYSICAL THERAPIST

## 2019-10-04 NOTE — PROGRESS NOTES
"Physical Therapy Daily Note      Visit # 1      Subjective   C/o LBP and R buttock pain. \"Sharp sciatic pain\" began a few months ago. Pain increases with activity. 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 Pain landing on R foot and twisting. R posterior hip throbs in car, in bed. Pain walking up stairs.  Hx L4-5 laminectomy and fusion 2016. Exercises 5-6x/week.   Rates pain 3-4/10 currently.     Objective   Soft tissue was assessed at Lumbar paraspinals and R posterior hip. PT noted point tenderness as well as palpable trigger points within the muscle tissue. On this date patient stated that they would like to undergo a dry needling procedure for the soft tissue dysfunction.   Patient was educated on the procedure for dry needling and consent waver was signed. Patient was informed of the risks, possible adverse effects, along with the benefits of TDN.      See Exercise, Manual, and Modality Logs for complete treatment.       Assessment/Plan  Increased tenderness of L4/5 paraspinals and R superior glutes and piriformis. Excellent tolerance to dry needling w/ desired twitch response in R gluteus medius. Plan to follow up 1-2x/week as needed.         Manual Therapy:    0     mins  06330;  Therapeutic Exercise:    0     mins  28630;     Neuromuscular Scott:    0    mins  13736;    Therapeutic Activity:     0     mins  99433;     Gait Trainin     mins  35695;     Ultrasound:     0     mins  67505;    Work Hardening           0      mins 20718  Iontophoresis               0   mins 45062    Timed Treatment:   0   mins   Total Treatment:     45   mins    Karolyn Aden, PT  Physical Therapist  "

## 2019-10-11 ENCOUNTER — TREATMENT (OUTPATIENT)
Dept: PHYSICAL THERAPY | Facility: CLINIC | Age: 44
End: 2019-10-11

## 2019-10-11 DIAGNOSIS — Z98.1 STATUS POST LUMBAR SPINAL FUSION: Primary | ICD-10-CM

## 2019-10-11 DIAGNOSIS — M54.31 RIGHT SIDED SCIATICA: ICD-10-CM

## 2019-10-11 PROCEDURE — DRYNDL PR CUSTOM DRY NEEDLING SELF PAY: Performed by: PHYSICAL THERAPIST

## 2019-10-11 NOTE — PROGRESS NOTES
Physical Therapy Daily Progress Note      Visit # 2      Subjective   Pt reports she felt pretty good for one day after dry needling, but once she returned to exercise, she had pain in R posterior hip. Reports some pain radiating to knee. She'd like to dry needle again today.    Objective   See Exercise, Manual, and Modality Logs for complete treatment.       Assessment/Plan  Continues to have increased tenderness of bilateral lumbar paraspinals and R posterior hip. Good tolerance to dry needling with application of functional electrical stimulation. Discussed stability exercises that pt is familiar with to incorporate into warm-up routine. Progress per POC.                   Manual Therapy:    0     mins  30518;  Therapeutic Exercise:    0     mins  37546;     Neuromuscular Scott:    0    mins  18460;    Therapeutic Activity:     0     mins  14819;     Gait Trainin     mins  13764;     Ultrasound:     0     mins  35770;    Work Hardening           0      mins 65999  Iontophoresis               0   mins 59139    Timed Treatment:   0   mins   Total Treatment:     30   mins    Karolyn Aden, PT  Physical Therapist

## 2019-10-18 ENCOUNTER — TREATMENT (OUTPATIENT)
Dept: PHYSICAL THERAPY | Facility: CLINIC | Age: 44
End: 2019-10-18

## 2019-10-18 ENCOUNTER — OFFICE VISIT (OUTPATIENT)
Dept: INTERNAL MEDICINE | Age: 44
End: 2019-10-18

## 2019-10-18 VITALS
DIASTOLIC BLOOD PRESSURE: 58 MMHG | HEART RATE: 60 BPM | TEMPERATURE: 97.9 F | OXYGEN SATURATION: 99 % | HEIGHT: 64 IN | BODY MASS INDEX: 19.97 KG/M2 | SYSTOLIC BLOOD PRESSURE: 100 MMHG | WEIGHT: 117 LBS

## 2019-10-18 DIAGNOSIS — Z98.1 STATUS POST LUMBAR SPINAL FUSION: Primary | ICD-10-CM

## 2019-10-18 DIAGNOSIS — M54.31 RIGHT SIDED SCIATICA: ICD-10-CM

## 2019-10-18 DIAGNOSIS — F41.3 OTHER MIXED ANXIETY DISORDERS: Primary | Chronic | ICD-10-CM

## 2019-10-18 DIAGNOSIS — R39.9 URINARY SYMPTOM OR SIGN: ICD-10-CM

## 2019-10-18 LAB
BILIRUB BLD-MCNC: NEGATIVE MG/DL
CLARITY, POC: ABNORMAL
COLOR UR: YELLOW
GLUCOSE UR STRIP-MCNC: NEGATIVE MG/DL
KETONES UR QL: NEGATIVE
LEUKOCYTE EST, POC: ABNORMAL
NITRITE UR-MCNC: NEGATIVE MG/ML
PH UR: 7 [PH] (ref 5–8)
PROT UR STRIP-MCNC: NEGATIVE MG/DL
RBC # UR STRIP: NEGATIVE /UL
SP GR UR: 1.01 (ref 1–1.03)
UROBILINOGEN UR QL: NORMAL

## 2019-10-18 PROCEDURE — 81003 URINALYSIS AUTO W/O SCOPE: CPT | Performed by: INTERNAL MEDICINE

## 2019-10-18 PROCEDURE — 99213 OFFICE O/P EST LOW 20 MIN: CPT | Performed by: INTERNAL MEDICINE

## 2019-10-18 PROCEDURE — PTSPVT PR CUSTOM PT TREATMENT OF SELF PAY PATIENT: Performed by: PHYSICAL THERAPIST

## 2019-10-18 NOTE — PROGRESS NOTES
Physical Therapy Daily Progress Note      Visit # 3      Subjective   Pt reports no low back or hip pain this week. Denies pain with exercise. Reports pain around L shoulder blade.    Objective   See Exercise, Manual, and Modality Logs for complete treatment.       Assessment/Plan  Upon assessment of L scapular region, lumbar region, and R hip, dry needling was not indicated. Subjective reports of back and hip pain much improved. Discussed exercise progressions for lumbar stability. Follow up as needed.               Manual Therapy:    0     mins  06057;  Therapeutic Exercise:    0     mins  14654;     Neuromuscular Scott:    0    mins  94926;    Therapeutic Activity:     15     mins  49148;     Gait Trainin     mins  99407;     Ultrasound:     0     mins  43290;    Work Hardening           0      mins 84580  Iontophoresis               0   mins 36029    Timed Treatment:   15   mins   Total Treatment:     15   mins    Karolyn Aden, PT  Physical Therapist

## 2019-10-18 NOTE — PROGRESS NOTES
"[facility] INTERNAL MEDICINE  JEWEL WAYNE M.D.      Kaia GUTHRIE Frederick / 44 y.o. / female  10/18/2019      CHIEF COMPLAINT     Depression (6 month f/u)      ASSESSMENT & PLAN     Problem List Items Addressed This Visit        High    Other mixed anxiety disorders - Primary (Chronic)    Overview     Stable. Continue citalopram 20 mg daily.          Relevant Medications    citalopram (CeleXA) 20 MG tablet      Other Visit Diagnoses     Urinary symptom or sign        Relevant Orders    POC Urinalysis Dipstick, Automated        Orders Placed This Encounter   Procedures   • POC Urinalysis Dipstick, Automated     No orders of the defined types were placed in this encounter.      Summary/Discussion:  ·       Next Appointment with me: Visit date not found    Return in about 6 months (around 4/18/2020) for Anxiety.      MEDICATIONS     Current Outpatient Medications   Medication Sig Dispense Refill   • BIOTIN PO Take  by mouth.     • Calcium Carb-Cholecalciferol (CALCIUM + D3 PO) Take 1 tablet by mouth daily.     • cetirizine (ZyrTEC) 10 MG tablet Take 10 mg by mouth every morning.     • Cholecalciferol (VITAMIN D) 1000 UNITS tablet Take 1,000 Units by mouth Daily.     • citalopram (CeleXA) 20 MG tablet TAKE 1 TABLET BY MOUTH EVERY DAY 90 tablet 1   • fluticasone (FLONASE) 50 MCG/ACT nasal spray 1 spray into each nostril 2 (Two) Times a Day. 1 bottle 5   • levothyroxine (SYNTHROID, LEVOTHROID) 88 MCG tablet Take 88 mcg by mouth Daily.     • Probiotic Product (PROBIOTIC DAILY PO) Take  by mouth.     • Turmeric 500 MG capsule Take 1 capsule by mouth Daily.       No current facility-administered medications for this visit.           VITAL SIGNS     Visit Vitals  /58   Pulse 60   Temp 97.9 °F (36.6 °C)   Ht 161.3 cm (63.5\")   Wt 53.1 kg (117 lb)   LMP 10/07/2019 (Exact Date)   SpO2 99%   BMI 20.40 kg/m²       BP Readings from Last 3 Encounters:   10/18/19 100/58   04/12/19 108/62   12/12/18 110/60     Wt Readings from Last 3 " Encounters:   10/18/19 53.1 kg (117 lb)   08/16/19 52.6 kg (116 lb)   04/12/19 51.7 kg (114 lb)      Body mass index is 20.4 kg/m².      HISTORY OF PRESENT ILLNESS     Reviewed chief complaint and details of complaint as documented by staff.     Complains of urine odor/color change. No dysuria, urgency or frequency.     Anxiety d/o is stable on citalopram.     Patient Care Team:  Mason Bonilla MD as PCP - General (Internal Medicine)  Nova Guevara MD as Consulting Physician (Endocrinology)  Yael Carson MD (Dermatology)  Ping Cisneros MD as Consulting Physician (Obstetrics and Gynecology)      REVIEW OF SYSTEMS     Review of Systems  Constitutional neg  GI neg  Psych stable anxiety; neg depression       PHYSICAL EXAMINATION     Physical Exam  Constitutional  No distress  Psychiatric  Alert. Judgment intact. Thought content normal. Mood normal      REVIEWED DATA     Labs:     Lab Results   Component Value Date     07/22/2017    K 4.9 07/22/2017    CALCIUM 8.1 (L) 07/22/2017    AST 10 07/21/2017    ALT 11 07/21/2017    BUN 11 07/22/2017    CREATININE 0.68 07/22/2017    CREATININE 0.66 07/21/2017    CREATININE 0.71 09/16/2016    EGFRIFNONA 96 07/22/2017    EGFRIFAFRI  09/16/2016      Comment:      <15 Indicative of kidney failure.       No results found for: HGBA1C, GLU, MICROALBUR    No results found for: LDL, HDL, TRIG, CHOLHDLRATIO    Lab Results   Component Value Date    TSH 0.129 (L) 04/16/2018    FREET4 1.54 04/17/2019       Lab Results   Component Value Date    WBC 9.95 07/22/2017    HGB 11.5 (L) 07/22/2017    HGB 14.7 07/21/2017    HGB 10.1 (L) 09/29/2016     07/22/2017       Lab Results   Component Value Date    GLUCOSEU Negative 07/21/2017    BLOODU Negative 07/21/2017    NITRITEU Negative 07/21/2017    LEUKOCYTESUR Trace (A) 08/16/2019       Imaging:         Medical Tests:         Summary of old records / correspondence / consultant report:         Request outside records:          ALLERGIES  No Known Allergies     PFSH:     The following portions of the patient's history were reviewed and updated as appropriate: Allergies / Current Medications / Past Medical History / Surgical History / Social History / Family History    PROBLEM LIST   Patient Active Problem List   Diagnosis   • Insomnia   • Iron deficiency anemia   • Vitamin D deficiency   • Hypothyroidism   • Pituitary adenoma (CMS/HCC)   • Other mixed anxiety disorders   • Chronic allergic rhinitis   • TMJ (temporomandibular joint disorder)   • Other irritable bowel syndrome       PAST MEDICAL HISTORY  Past Medical History:   Diagnosis Date   • Acute appendicitis with localized peritonitis 7/21/2017   • Anxiety    • Asthma    • Depression    • Hypothyroidism    • Lumbar canal stenosis 8/16/2016   • Migraine     RARE   • Pituitary adenoma (CMS/HCC)     benign   • Spinal stenosis    • Spondylolisthesis of lumbar region 8/16/2016       SURGICAL HISTORY  Past Surgical History:   Procedure Laterality Date   • ABDOMINOPLASTY N/A 02/12/2016    Dr. Isidro Nguyen   • APPENDECTOMY N/A 7/21/2017    Procedure: APPENDECTOMY LAPAROSCOPIC;  Surgeon: Leticia Elizabeth MD;  Location: [facility] OR;  Service:    • BREAST AUGMENTATION Bilateral 02/12/2016    Dr. Isidro Nguyen   • DENTAL EXAMINATION UNDER ANESTHESIA     • LUMBAR DISCECTOMY FUSION INSTRUMENTATION N/A 9/26/2016    Procedure: L4-5  fusion with instrumentation;  Surgeon: Isidro Dietz MD;  Location: [facility] OR;  Service:    • LUMBAR LAMINECTOMY DISCECTOMY DECOMPRESSION N/A 9/26/2016    Procedure: L4-L5 LUMBAR LAMINECTOMY;  Surgeon: Darrell Olvera MD;  Location: [facility] OR;  Service:    • SCAR REVISION BREAST Bilateral 4/29/2016    Procedure: REVISION GABBY BREAST, REMOVE AND REPLACE RIGHT BREAST IMPLANT;  Surgeon: Isidro Nguyen MD;  Location: [facility] OR;  Service:    • WISDOM TOOTH EXTRACTION Bilateral        SOCIAL HISTORY  Social History     Socioeconomic History   •  Marital status:      Spouse name: Fidel   • Number of children: 3   • Years of education: Bachelors   • Highest education level: Not on file   Occupational History   • Occupation: Homemaker   Tobacco Use   • Smoking status: Never Smoker   • Smokeless tobacco: Never Used   Substance and Sexual Activity   • Alcohol use: Yes     Alcohol/week: 1.8 - 3.0 oz     Types: 3 - 5 Glasses of wine per week   • Drug use: No   • Sexual activity: Yes     Partners: Male     Comment:  had vasectomy       FAMILY HISTORY  Family History   Problem Relation Age of Onset   • Lymphoma Father         CLL   • Hypothyroidism Father    • Colon polyps Father    • Breast cancer Mother 59   • Bipolar disorder Mother         type 1   • Diabetes Mother         type 2   • Skin cancer Sister    • Melanoma Sister    • Coronary artery disease Brother 53   • Drug abuse Brother    • Heart failure Maternal Grandmother    • Coronary artery disease Maternal Grandmother    • Bipolar disorder Maternal Grandmother    • Coronary artery disease Maternal Grandfather    • Diabetes Paternal Grandmother    • Prostate cancer Paternal Grandfather    • Graves' disease Paternal Aunt    • Abnormal EKG Paternal Aunt    • Alcohol abuse Maternal Uncle    • Drug abuse Maternal Uncle    • Colon cancer Neg Hx          **Dragon Disclaimer:   Much of this encounter note is an electronic transcription/translation of spoken language to printed text. The electronic translation of spoken language may permit erroneous, or at times, nonsensical words or phrases to be inadvertently transcribed. Although I have reviewed the note for such errors, some may still exist.       Template created by Sukhi Bonilla MD

## 2019-10-22 ENCOUNTER — TELEPHONE (OUTPATIENT)
Dept: INTERNAL MEDICINE | Age: 44
End: 2019-10-22

## 2019-10-22 DIAGNOSIS — N30.00 ACUTE CYSTITIS WITHOUT HEMATURIA: Primary | ICD-10-CM

## 2019-10-22 LAB
BACTERIA UR CULT: ABNORMAL
BACTERIA UR CULT: ABNORMAL
OTHER ANTIBIOTIC SUSC ISLT: ABNORMAL

## 2019-10-22 RX ORDER — CEPHALEXIN 250 MG/1
250 CAPSULE ORAL 2 TIMES DAILY
Qty: 6 CAPSULE | Refills: 0 | Status: SHIPPED | OUTPATIENT
Start: 2019-10-22 | End: 2019-10-25

## 2019-12-05 DIAGNOSIS — F41.3 OTHER MIXED ANXIETY DISORDERS: Chronic | ICD-10-CM

## 2019-12-05 RX ORDER — CITALOPRAM 20 MG/1
TABLET ORAL
Qty: 90 TABLET | Refills: 1 | Status: SHIPPED | OUTPATIENT
Start: 2019-12-05 | End: 2020-05-29 | Stop reason: SDUPTHER

## 2019-12-26 ENCOUNTER — OFFICE VISIT (OUTPATIENT)
Dept: INTERNAL MEDICINE | Age: 44
End: 2019-12-26

## 2019-12-26 ENCOUNTER — TELEPHONE (OUTPATIENT)
Dept: INTERNAL MEDICINE | Age: 44
End: 2019-12-26

## 2019-12-26 VITALS
HEART RATE: 97 BPM | TEMPERATURE: 98.2 F | WEIGHT: 118.4 LBS | BODY MASS INDEX: 20.22 KG/M2 | DIASTOLIC BLOOD PRESSURE: 68 MMHG | OXYGEN SATURATION: 99 % | RESPIRATION RATE: 14 BRPM | HEIGHT: 64 IN | SYSTOLIC BLOOD PRESSURE: 100 MMHG

## 2019-12-26 DIAGNOSIS — R82.90 ABNORMAL URINE ODOR: ICD-10-CM

## 2019-12-26 DIAGNOSIS — J40 BRONCHITIS: Primary | ICD-10-CM

## 2019-12-26 LAB
BILIRUB BLD-MCNC: NEGATIVE MG/DL
CLARITY, POC: ABNORMAL
COLOR UR: YELLOW
GLUCOSE UR STRIP-MCNC: NEGATIVE MG/DL
KETONES UR QL: NEGATIVE
LEUKOCYTE EST, POC: ABNORMAL
NITRITE UR-MCNC: NEGATIVE MG/ML
PH UR: 6 [PH] (ref 5–8)
PROT UR STRIP-MCNC: NEGATIVE MG/DL
RBC # UR STRIP: NEGATIVE /UL
SP GR UR: 1.01 (ref 1–1.03)
UROBILINOGEN UR QL: NORMAL

## 2019-12-26 PROCEDURE — 81003 URINALYSIS AUTO W/O SCOPE: CPT | Performed by: NURSE PRACTITIONER

## 2019-12-26 PROCEDURE — 99213 OFFICE O/P EST LOW 20 MIN: CPT | Performed by: NURSE PRACTITIONER

## 2019-12-26 RX ORDER — BROMPHENIRAMINE MALEATE, PSEUDOEPHEDRINE HYDROCHLORIDE, AND DEXTROMETHORPHAN HYDROBROMIDE 2; 30; 10 MG/5ML; MG/5ML; MG/5ML
5 SYRUP ORAL 4 TIMES DAILY PRN
Qty: 118 ML | Refills: 1 | Status: SHIPPED | OUTPATIENT
Start: 2019-12-26 | End: 2019-12-26 | Stop reason: SINTOL

## 2019-12-26 NOTE — TELEPHONE ENCOUNTER
Left message that ua was normal if questions please call office      ----- Message from BALBINA Cox sent at 12/26/2019 12:37 PM EST -----  Please call Mrs. Mack and let her know that her urine test was negative for infection.   Thank you,   BALBINA Cox

## 2019-12-26 NOTE — PROGRESS NOTES
"[facility] [place]  BALBINA Cox Frederick / 44 y.o. / female  12/26/2019      ASSESSMENT & PLAN:    Problem List Items Addressed This Visit     None      Visit Diagnoses     Bronchitis    -  Primary    Abnormal urine odor        Relevant Orders    POCT urinalysis dipstick, automated (Completed)        Orders Placed This Encounter   Procedures   • POCT urinalysis dipstick, automated     No orders of the defined types were placed in this encounter.      Summary/Discussion:  Trial Mucinex Dm OTC for cough suppression, considering intolerance to pseudoephedrine and cannot take anything that will cause drowsiness. Combine with frequent hydration and cough drops.     Urine sample: negative for UTI. Encouraged her to increase fluids daily, considering frequency of exercise may need more than current intake.      Return if symptoms worsen or fail to improve, for Next scheduled follow up.  ____________________________________________________________________    MEDICATIONS  Current Outpatient Medications   Medication Sig Dispense Refill   • BIOTIN PO Take  by mouth.     • Calcium Carb-Cholecalciferol (CALCIUM + D3 PO) Take 1 tablet by mouth daily.     • cetirizine (ZyrTEC) 10 MG tablet Take 10 mg by mouth every morning.     • Cholecalciferol (VITAMIN D) 1000 UNITS tablet Take 1,000 Units by mouth Daily.     • citalopram (CeleXA) 20 MG tablet TAKE 1 TABLET BY MOUTH EVERY DAY 90 tablet 1   • fluticasone (FLONASE) 50 MCG/ACT nasal spray 1 spray into each nostril 2 (Two) Times a Day. 1 bottle 5   • levothyroxine (SYNTHROID, LEVOTHROID) 88 MCG tablet Take 88 mcg by mouth Daily.     • Probiotic Product (PROBIOTIC DAILY PO) Take  by mouth.     • Turmeric 500 MG capsule Take 1 capsule by mouth Daily.       No current facility-administered medications for this visit.        VITALS    Visit Vitals  /68   Pulse 97   Temp 98.2 °F (36.8 °C)   Resp 14   Ht 161.3 cm (63.5\")   Wt 53.7 kg (118 lb 6.4 oz)   SpO2 99% "   BMI 20.64 kg/m²       BP Readings from Last 3 Encounters:   12/26/19 100/68   10/18/19 100/58   04/12/19 108/62     Wt Readings from Last 3 Encounters:   12/26/19 53.7 kg (118 lb 6.4 oz)   10/18/19 53.1 kg (117 lb)   08/16/19 52.6 kg (116 lb)      Body mass index is 20.64 kg/m².    CC:  Main reason(s) for today's visit: cough  HPI:     The patient is here for dry, persistent cough for 2 days, history of 2 weeks of 'cold' that was better then got worse these past few days. Associated sx: headache, sore throat, body aches (mild) x 2 days. Cough keeping her up at night. Denies subjective fever, nasal congestion, sneezing, sinus pressure/pain, hoarseness, enlarged tonsils, tonsillar exudate, wheezing, shortness of breath, swollen cervical lymph nodes, diarrhea and vomiting.  Pt has been around sick contacts: Yes, her two daughters have the flu this past 1 week  OTC medications tried: dayquil and nyquil without relief.     Of note, she was here a few months ago for UTI sx and treated with antibiotics, without improvement in urine odor, color. Denies frequency, cystitis, abdominal or low back pain, dysuria, fever or hematuria.    Patient Care Team:  Mason Bonilla MD as PCP - General (Internal Medicine)  Nova Guevara MD as Consulting Physician (Endocrinology)  Yael Carson MD (Dermatology)  Ping Cisneros MD as Consulting Physician (Obstetrics and Gynecology)  ____________________________________________________________________      REVIEW OF SYSTEMS    Review of Systems  As noted per HPI  Eyes: Negative.    Cardiovascular: Negative.  Negative for chest pain, palpitations and leg swelling.   Musculoskeletal: Negative.    Neurological: Negative.      PHYSICAL EXAMINATION    Physical Exam   Constitutional: She is oriented to person, place, and time. She appears well-developed and well-nourished. She appears distressed (mild).   HENT:   Head: Normocephalic and atraumatic.   Right Ear: External ear normal.    Left Ear: External ear normal.   Nose: Nose normal.   Oropharynx moderate PND, mild erythema, no edema or exudates   Eyes: Pupils are equal, round, and reactive to light. EOM are normal.   Neck: Normal range of motion. Neck supple.   Cardiovascular: Normal rate, regular rhythm and normal heart sounds.   Pulmonary/Chest: Effort normal. No respiratory distress. She has no wheezes.   Lymphadenopathy:     She has no cervical adenopathy.   Neurological: She is alert and oriented to person, place, and time.   Skin: Skin is warm and dry. She is not diaphoretic.   Psychiatric: She has a normal mood and affect.   Vitals reviewed.      Brief Urine Lab Results  (Last result in the past 365 days)      Color   Clarity   Blood   Leuk Est   Nitrite   Protein   CREAT   Urine HCG        12/26/19 1113 Yellow Hazy Negative Trace Negative Negative               REVIEWED DATA:    Labs:   Lab Results   Component Value Date     07/22/2017    K 4.9 07/22/2017    AST 10 07/21/2017    ALT 11 07/21/2017    BUN 11 07/22/2017    CREATININE 0.68 07/22/2017    CREATININE 0.66 07/21/2017    CREATININE 0.71 09/16/2016    EGFRIFNONA 96 07/22/2017    EGFRIFAFRI  09/16/2016      Comment:      <15 Indicative of kidney failure.       Lab Results   Component Value Date    GLUCOSE 112 (H) 07/22/2017    GLUCOSE 101 (H) 07/21/2017    GLUCOSE 81 09/16/2016       No results found for: LDL, HDL, TRIG, CHOLHDLRATIO    Lab Results   Component Value Date    TSH 0.129 (L) 04/16/2018    FREET4 1.54 04/17/2019          Lab Results   Component Value Date    WBC 9.95 07/22/2017    HGB 11.5 (L) 07/22/2017    HGB 14.7 07/21/2017    HGB 10.1 (L) 09/29/2016     07/22/2017       Lab Results   Component Value Date    GLUCOSEU Negative 07/21/2017    BLOODU Negative 07/21/2017    NITRITEU Negative 07/21/2017    LEUKOCYTESUR Trace (A) 12/26/2019       Imaging:        Medical Tests:        Summary of old records / correspondence / consultant report:         Request outside records:        ALLERGIES  No Known Allergies     PFSH:     The following portions of the patient's history were reviewed and updated as appropriate: Allergies / Current Medications / Past Medical History / Surgical History / Social History / Family History    PROBLEM LIST   Patient Active Problem List   Diagnosis   • Insomnia   • Iron deficiency anemia   • Vitamin D deficiency   • Hypothyroidism   • Pituitary adenoma (CMS/HCC)   • Other mixed anxiety disorders   • Chronic allergic rhinitis   • TMJ (temporomandibular joint disorder)   • Other irritable bowel syndrome       PAST MEDICAL HISTORY  Past Medical History:   Diagnosis Date   • Acute appendicitis with localized peritonitis 7/21/2017   • Anxiety    • Asthma    • Depression    • Hypothyroidism    • Lumbar canal stenosis 8/16/2016   • Migraine     RARE   • Pituitary adenoma (CMS/HCC)     benign   • Spinal stenosis    • Spondylolisthesis of lumbar region 8/16/2016       SURGICAL HISTORY  Past Surgical History:   Procedure Laterality Date   • ABDOMINOPLASTY N/A 02/12/2016    Dr. Isidro Nguyen   • APPENDECTOMY N/A 7/21/2017    Procedure: APPENDECTOMY LAPAROSCOPIC;  Surgeon: Leticia Elizabeth MD;  Location: [facility];  Service:    • BREAST AUGMENTATION Bilateral 02/12/2016    Dr. Isidro Nguyen   • DENTAL EXAMINATION UNDER ANESTHESIA     • LUMBAR DISCECTOMY FUSION INSTRUMENTATION N/A 9/26/2016    Procedure: L4-5  fusion with instrumentation;  Surgeon: Isidro Dietz MD;  Location: [facility] OR;  Service:    • LUMBAR LAMINECTOMY DISCECTOMY DECOMPRESSION N/A 9/26/2016    Procedure: L4-L5 LUMBAR LAMINECTOMY;  Surgeon: Darrell Olvera MD;  Location: [facility] OR;  Service:    • SCAR REVISION BREAST Bilateral 4/29/2016    Procedure: REVISION GABBY BREAST, REMOVE AND REPLACE RIGHT BREAST IMPLANT;  Surgeon: Isidro Nguyen MD;  Location: [facility] OR;  Service:    • WISDOM TOOTH EXTRACTION Bilateral        SOCIAL HISTORY  Social  History     Socioeconomic History   • Marital status:      Spouse name: Fidel   • Number of children: 3   • Years of education: Bachelors   • Highest education level: Not on file   Occupational History   • Occupation: Homemaker   Tobacco Use   • Smoking status: Never Smoker   • Smokeless tobacco: Never Used   Substance and Sexual Activity   • Alcohol use: Yes     Alcohol/week: 3.0 - 5.0 standard drinks     Types: 3 - 5 Glasses of wine per week   • Drug use: No   • Sexual activity: Yes     Partners: Male     Comment:  had vasectomy       FAMILY HISTORY  Family History   Problem Relation Age of Onset   • Lymphoma Father         CLL   • Hypothyroidism Father    • Colon polyps Father    • Breast cancer Mother 59   • Bipolar disorder Mother         type 1   • Diabetes Mother         type 2   • Skin cancer Sister    • Melanoma Sister    • Coronary artery disease Brother 53   • Drug abuse Brother    • Heart failure Maternal Grandmother    • Coronary artery disease Maternal Grandmother    • Bipolar disorder Maternal Grandmother    • Coronary artery disease Maternal Grandfather    • Diabetes Paternal Grandmother    • Prostate cancer Paternal Grandfather    • Graves' disease Paternal Aunt    • Abnormal EKG Paternal Aunt    • Alcohol abuse Maternal Uncle    • Drug abuse Maternal Uncle    • Colon cancer Neg Hx

## 2019-12-26 NOTE — PATIENT INSTRUCTIONS
Robitussin DM also known as   MUCINEX DM (guaifenesin dextromethorphan)    Guaifenesin is an expectorant. It helps loosen congestion in your chest and throat, making it easier to cough out through your mouth.  Dextromethorphan is a cough suppressant. It affects the signals in the brain that trigger cough reflex.  Dextromethorphan and guaifenesin is a combination medicine used to treat cough and chest congestion caused by the common cold, infections, or allergies.  Dextromethorphan will not treat a cough that is caused by smoking, asthma, or emphysema.

## 2020-04-24 ENCOUNTER — TELEMEDICINE (OUTPATIENT)
Dept: INTERNAL MEDICINE | Age: 45
End: 2020-04-24

## 2020-04-24 DIAGNOSIS — F41.3 OTHER MIXED ANXIETY DISORDERS: Primary | ICD-10-CM

## 2020-04-24 DIAGNOSIS — E03.9 HYPOTHYROIDISM, UNSPECIFIED TYPE: Chronic | ICD-10-CM

## 2020-04-24 PROCEDURE — 99213 OFFICE O/P EST LOW 20 MIN: CPT | Performed by: INTERNAL MEDICINE

## 2020-04-24 NOTE — PROGRESS NOTES
[facility] [place] MEDICINE  JEWEL BONILLA M.D.      Kaia GUTHRIE Frederick / 45 y.o. / female  04/24/2020      CC:  Main reason(s) for today's visit: TELEHEALTH ENCOUNTER: Anxiety      HPI:     THIS WAS A MYCHART TELEHEALTH ENCOUNTER NECESSITATED BY CURRENT COVID-19 CRISIS.      You have chosen to receive care through a telehealth visit.  Do you consent to use a video/audio connection for your medical care today? Yes      Anxiety disorder: doing fine over COVID-19 crisis. Taking citalopram 20 mg without worsening mood symptoms. Denies problem with medications.     Hypothyroidism: sees endocrinologist. Last labs 4/2019.       Patient Care Team:  Mason Bonilla MD as PCP - General (Internal Medicine)  Nova Guevara MD as Consulting Physician (Endocrinology)  Yael Carson MD (Dermatology)  Ping Cisneros MD as Consulting Physician (Obstetrics and Gynecology)    ASSESSMENT & PLAN:    Problem List Items Addressed This Visit        High    Other mixed anxiety disorders - Primary (Chronic)    Current Assessment & Plan     Telehealth  Stable overall. Continue citalopram 20 mg qd.          Relevant Medications    citalopram (CeleXA) 20 MG tablet       Low    Hypothyroidism (Chronic)    Overview     *[place]  Continue current dose of levothyroxine and f/u with endo.          Relevant Medications    levothyroxine (SYNTHROID, LEVOTHROID) 88 MCG tablet           Summary/Discussion:  • Advised to have thyroid lab checked with endocrinologist.       Time spent: 10 minutes    Next Appointment with me: Visit date not found    Return in about 8 months (around 12/24/2020) for Anxiety.    ____________________________________________________________________    MEDICATIONS  Current Outpatient Medications   Medication Sig Dispense Refill   • citalopram (CeleXA) 20 MG tablet TAKE 1 TABLET BY MOUTH EVERY DAY 90 tablet 1   • levothyroxine (SYNTHROID, LEVOTHROID) 88 MCG tablet Take 88 mcg by mouth Daily.     • BIOTIN PO Take  by mouth.     •  Calcium Carb-Cholecalciferol (CALCIUM + D3 PO) Take 1 tablet by mouth daily.     • cetirizine (ZyrTEC) 10 MG tablet Take 10 mg by mouth every morning.     • Cholecalciferol (VITAMIN D) 1000 UNITS tablet Take 1,000 Units by mouth Daily.     • fluticasone (FLONASE) 50 MCG/ACT nasal spray 1 spray into each nostril 2 (Two) Times a Day. 1 bottle 5   • Probiotic Product (PROBIOTIC DAILY PO) Take  by mouth.     • Turmeric 500 MG capsule Take 1 capsule by mouth Daily.       No current facility-administered medications for this visit.           ____________________________________________________________________      REVIEW OF SYSTEMS    Review of Systems  Constitutional neg  Resp neg  CV neg   Psych stable mood     PHYSICAL EXAMINATION  There were no vitals taken for this visit.   No acute distress.  Alert with normal thought and judgment.       REVIEWED DATA:    Labs:   Lab Results   Component Value Date    TSH 0.129 (L) 04/16/2018    FREET4 1.54 04/17/2019    FREET4 1.34 04/16/2018          Imaging:         Medical Tests:         Summary of old records / correspondence / consultant report:          Request outside records:         ALLERGIES  No Known Allergies     PFSH:     The following portions of the patient's history were reviewed and updated as appropriate: Allergies / Current Medications / Past Medical History / Surgical History / Social History / Family History    PROBLEM LIST   Patient Active Problem List   Diagnosis   • Insomnia   • Iron deficiency anemia   • Vitamin D deficiency   • Hypothyroidism   • Pituitary adenoma (CMS/HCC)   • Other mixed anxiety disorders   • Chronic allergic rhinitis   • TMJ (temporomandibular joint disorder)   • Other irritable bowel syndrome       PAST MEDICAL HISTORY  Past Medical History:   Diagnosis Date   • Acute appendicitis with localized peritonitis 7/21/2017   • Anxiety    • Asthma    • Depression    • Hypothyroidism    • Lumbar canal stenosis 8/16/2016   • Migraine     RARE    • Pituitary adenoma (CMS/HCC)     benign   • Spinal stenosis    • Spondylolisthesis of lumbar region 8/16/2016       SURGICAL HISTORY  Past Surgical History:   Procedure Laterality Date   • ABDOMINOPLASTY N/A 02/12/2016    Dr. Isidro Nguyen   • APPENDECTOMY N/A 7/21/2017    Procedure: APPENDECTOMY LAPAROSCOPIC;  Surgeon: Leticia Elizabeth MD;  Location: [facility];  Service:    • BREAST AUGMENTATION Bilateral 02/12/2016    Dr. Isidro Nguyen   • DENTAL EXAMINATION UNDER ANESTHESIA     • LUMBAR DISCECTOMY FUSION INSTRUMENTATION N/A 9/26/2016    Procedure: L4-5  fusion with instrumentation;  Surgeon: Isidro Dietz MD;  Location: [facility] OR;  Service:    • LUMBAR LAMINECTOMY DISCECTOMY DECOMPRESSION N/A 9/26/2016    Procedure: L4-L5 LUMBAR LAMINECTOMY;  Surgeon: Darrell Olvera MD;  Location: [facility] OR;  Service:    • SCAR REVISION BREAST Bilateral 4/29/2016    Procedure: REVISION GABBY BREAST, REMOVE AND REPLACE RIGHT BREAST IMPLANT;  Surgeon: Isidro Nguyen MD;  Location: [facility];  Service:    • WISDOM TOOTH EXTRACTION Bilateral        SOCIAL HISTORY  Social History     Socioeconomic History   • Marital status:      Spouse name: Fidel   • Number of children: 3   • Years of education: Bachelors   • Highest education level: Not on file   Occupational History   • Occupation: Homemaker   Tobacco Use   • Smoking status: Never Smoker   • Smokeless tobacco: Never Used   Substance and Sexual Activity   • Alcohol use: Yes     Alcohol/week: 3.0 - 5.0 standard drinks     Types: 3 - 5 Glasses of wine per week   • Drug use: No   • Sexual activity: Yes     Partners: Male     Comment:  had vasectomy           **Dragon Disclaimer:   Much of this encounter note is an electronic transcription/translation of spoken language to printed text. The electronic translation of spoken language may permit erroneous, or at times, nonsensical words or phrases to be inadvertently transcribed. Although I have  reviewed the note for such errors, some may still exist.     Template created by Sukhi Bonilla MD

## 2020-05-29 DIAGNOSIS — F41.3 OTHER MIXED ANXIETY DISORDERS: Chronic | ICD-10-CM

## 2020-05-29 RX ORDER — CITALOPRAM 20 MG/1
TABLET ORAL
Qty: 90 TABLET | Refills: 1 | Status: SHIPPED | OUTPATIENT
Start: 2020-05-29 | End: 2020-12-14 | Stop reason: SDUPTHER

## 2020-12-14 DIAGNOSIS — F41.3 OTHER MIXED ANXIETY DISORDERS: Chronic | ICD-10-CM

## 2020-12-14 RX ORDER — CITALOPRAM 20 MG/1
20 TABLET ORAL DAILY
Qty: 90 TABLET | Refills: 1 | Status: SHIPPED | OUTPATIENT
Start: 2020-12-14 | End: 2021-06-01

## 2020-12-18 ENCOUNTER — OFFICE VISIT (OUTPATIENT)
Dept: INTERNAL MEDICINE | Age: 45
End: 2020-12-18

## 2020-12-18 VITALS
TEMPERATURE: 98 F | WEIGHT: 120 LBS | BODY MASS INDEX: 21.26 KG/M2 | OXYGEN SATURATION: 98 % | SYSTOLIC BLOOD PRESSURE: 104 MMHG | HEART RATE: 58 BPM | HEIGHT: 63 IN | DIASTOLIC BLOOD PRESSURE: 62 MMHG

## 2020-12-18 DIAGNOSIS — E03.9 HYPOTHYROIDISM, UNSPECIFIED TYPE: Chronic | ICD-10-CM

## 2020-12-18 DIAGNOSIS — F41.3 OTHER MIXED ANXIETY DISORDERS: Primary | Chronic | ICD-10-CM

## 2020-12-18 DIAGNOSIS — D35.2 PITUITARY ADENOMA (HCC): Chronic | ICD-10-CM

## 2020-12-18 PROCEDURE — 99213 OFFICE O/P EST LOW 20 MIN: CPT | Performed by: INTERNAL MEDICINE

## 2020-12-18 RX ORDER — LANOLIN ALCOHOL/MO/W.PET/CERES
1000 CREAM (GRAM) TOPICAL DAILY
COMMUNITY

## 2020-12-18 NOTE — PROGRESS NOTES
"[facility] [place] [facility]NAL MEDICINE  JEWEL WAYNE M.D.      Kaia Mack / 45 y.o. / female  12/18/2020    CHIEF COMPLAINT     Anxiety      ASSESSMENT & PLAN     Problem List Items Addressed This Visit        High    Other mixed anxiety disorders - Primary (Chronic)    Current Assessment & Plan     Stable. Continue citalopram 20 mg qd.          Relevant Medications    citalopram (CeleXA) 20 MG tablet       Medium    Hypothyroidism (Chronic)    Overview     *Veeneman  Continue current dose of levothyroxine and f/u with endo.          Relevant Medications    levothyroxine (SYNTHROID, LEVOTHROID) 88 MCG tablet       Low    Pituitary adenoma (CMS/HCC) (Chronic)    Overview     *Veenamen  Continue monitoring by endo.              No orders of the defined types were placed in this encounter.    No orders of the defined types were placed in this encounter.      Summary/Discussion:  •     Next Appointment with me: Visit date not found    Return in about 6 months (around 6/18/2021) for ANNUAL PHYSICAL.    _____________________________________________________________________________________    VITAL SIGNS     Visit Vitals  /62   Pulse 58   Temp 98 °F (36.7 °C)   Ht 160 cm (63\")   Wt 54.4 kg (120 lb)   LMP 12/09/2020 (Approximate)   SpO2 98%   BMI 21.26 kg/m²       BP Readings from Last 3 Encounters:   12/18/20 104/62   11/07/20 109/64   07/02/20 114/73     Wt Readings from Last 3 Encounters:   12/18/20 54.4 kg (120 lb)   11/07/20 53.5 kg (118 lb)   07/02/20 54.4 kg (120 lb)     Body mass index is 21.26 kg/m².      MEDICATIONS     Current Outpatient Medications   Medication Sig Dispense Refill   • BIOTIN PO Take  by mouth.     • Calcium Carb-Cholecalciferol (CALCIUM + D3 PO) Take 1 tablet by mouth daily.     • cetirizine (ZyrTEC) 10 MG tablet Take 10 mg by mouth every morning.     • Cholecalciferol (VITAMIN D) 1000 UNITS tablet Take 1,000 Units by mouth Daily.     • citalopram (CeleXA) 20 MG tablet Take 1 tablet by mouth Daily. 90 " tablet 1   • fluticasone (FLONASE) 50 MCG/ACT nasal spray 1 spray into each nostril 2 (Two) Times a Day. 1 bottle 5   • levothyroxine (SYNTHROID, LEVOTHROID) 88 MCG tablet Take 88 mcg by mouth Daily.     • vitamin B-12 (CYANOCOBALAMIN) 1000 MCG tablet Take 1,000 mcg by mouth Daily.     • Probiotic Product (PROBIOTIC DAILY PO) Take  by mouth.     • Turmeric 500 MG capsule Take 1 capsule by mouth Daily.       No current facility-administered medications for this visit.        _____________________________________________________________________________________    HISTORY OF PRESENT ILLNESS     Anxiety disorder: stable overall on citalopram 20 mg without significant side effects. No depression. No significant sexual problems.   Hypothyroidism managed by endocrine. Has history of pituitary adenoma. No headaches or vision change.       Patient Care Team:  Mason Bonilla MD as PCP - General (Internal Medicine)  Nova Guevara MD as Consulting Physician (Endocrinology)  Yael Carson MD (Dermatology)  Ping Cisneros MD as Consulting Physician (Obstetrics and Gynecology)      REVIEW OF SYSTEMS     Review of Systems  Constitutional neg except per HPI   Resp neg  CV neg   No significant change in mood or cognition       PHYSICAL EXAMINATION     Physical Exam  Psych: normal thought and judgment, normal mood/affect   Cardiovascular: Normal rate, regular rhythm.     REVIEWED DATA     Labs:     Lab Results   Component Value Date     07/22/2017    K 4.9 07/22/2017    CALCIUM 8.1 (L) 07/22/2017    AST 10 07/21/2017    ALT 11 07/21/2017    BUN 11 07/22/2017    CREATININE 0.68 07/22/2017    CREATININE 0.66 07/21/2017    CREATININE 0.71 09/16/2016    EGFRIFNONA 96 07/22/2017    EGFRIFAFRI  09/16/2016      Comment:      <15 Indicative of kidney failure.       No results found for: HGBA1C, GLU, MICROALBUR    No results found for: LDL, HDL, TRIG, CHOLHDLRATIO    Lab Results   Component Value Date    TSH 0.129 (L)  04/16/2018    FREET4 1.69 09/28/2020       Lab Results   Component Value Date    WBC 9.95 07/22/2017    HGB 11.5 (L) 07/22/2017    HGB 14.7 07/21/2017    HGB 10.1 (L) 09/29/2016     07/22/2017       Lab Results   Component Value Date    GLUCOSEU Negative 07/21/2017    BLOODU Negative 07/21/2017    NITRITEU Negative 07/21/2017    LEUKOCYTESUR Trace (A) 12/26/2019       Imaging:           Medical Tests:           Summary of old records / correspondence / consultant report:           Request outside records:           ALLERGIES  No Known Allergies     PFSH:     The following portions of the patient's history were reviewed and updated as appropriate: Allergies / Current Medications / Past Medical History / Surgical History / Social History / Family History    PROBLEM LIST   Patient Active Problem List   Diagnosis   • Insomnia   • Iron deficiency anemia   • Vitamin D deficiency   • Hypothyroidism   • Pituitary adenoma (CMS/HCC)   • Other mixed anxiety disorders   • Chronic allergic rhinitis   • TMJ (temporomandibular joint disorder)   • Other irritable bowel syndrome       PAST MEDICAL HISTORY  Past Medical History:   Diagnosis Date   • Acute appendicitis with localized peritonitis 7/21/2017   • Anxiety    • Asthma    • Depression    • Hypothyroidism    • Lumbar canal stenosis 8/16/2016   • Migraine     RARE   • Pituitary adenoma (CMS/HCC)     benign   • Spinal stenosis    • Spondylolisthesis of lumbar region 8/16/2016       SURGICAL HISTORY  Past Surgical History:   Procedure Laterality Date   • ABDOMINOPLASTY N/A 02/12/2016    Dr. Isidro Nguyen   • APPENDECTOMY N/A 7/21/2017    Procedure: APPENDECTOMY LAPAROSCOPIC;  Surgeon: Leticia Elizabeth MD;  Location: [facility];  Service:    • BREAST AUGMENTATION Bilateral 02/12/2016    Dr. Isidro Nguyen   • DENTAL EXAMINATION UNDER ANESTHESIA     • LUMBAR DISCECTOMY FUSION INSTRUMENTATION N/A 9/26/2016    Procedure: L4-5  fusion with instrumentation;  Surgeon: Isidro  PENNY Dietz MD;  Location: [facility] OR;  Service:    • LUMBAR LAMINECTOMY DISCECTOMY DECOMPRESSION N/A 9/26/2016    Procedure: L4-L5 LUMBAR LAMINECTOMY;  Surgeon: Darrell Olvera MD;  Location: [facility] OR;  Service:    • SCAR REVISION BREAST Bilateral 4/29/2016    Procedure: REVISION GABBY BREAST, REMOVE AND REPLACE RIGHT BREAST IMPLANT;  Surgeon: Isidro Nguyen MD;  Location: [facility] MAIN OR;  Service:    • WISDOM TOOTH EXTRACTION Bilateral        SOCIAL HISTORY  Social History     Socioeconomic History   • Marital status:      Spouse name: Fidel   • Number of children: 3   • Years of education: Bachelors   • Highest education level: Not on file   Occupational History   • Occupation: Homemaker   Tobacco Use   • Smoking status: Never Smoker   • Smokeless tobacco: Never Used   Substance and Sexual Activity   • Alcohol use: Yes     Alcohol/week: 3.0 - 5.0 standard drinks     Types: 3 - 5 Glasses of wine per week   • Drug use: No   • Sexual activity: Yes     Partners: Male     Comment:  had vasectomy       FAMILY HISTORY  Family History   Problem Relation Age of Onset   • Lymphoma Father         CLL   • Hypothyroidism Father    • Colon polyps Father    • Breast cancer Mother 59   • Bipolar disorder Mother         type 1   • Stroke Mother 74   • Diabetes type II Mother    • Skin cancer Sister    • Melanoma Sister    • Coronary artery disease Brother 53   • Drug abuse Brother    • Heart failure Maternal Grandmother    • Coronary artery disease Maternal Grandmother    • Bipolar disorder Maternal Grandmother    • Coronary artery disease Maternal Grandfather    • Diabetes Paternal Grandmother    • Prostate cancer Paternal Grandfather    • Graves' disease Paternal Aunt    • Abnormal EKG Paternal Aunt    • Alcohol abuse Maternal Uncle    • Drug abuse Maternal Uncle    • Colon cancer Neg Hx          Examiner was wearing KN95 mask, face shield and exam gloves during the entire duration of the visit. Patient  was masked the entire time.   Minimum social distance of 6 ft maintained entire visit except if physical contact was necessary as documented.     **Dragon Disclaimer:   Much of this encounter note is an electronic transcription/translation of spoken language to printed text. The electronic translation of spoken language may permit erroneous, or at times, nonsensical words or phrases to be inadvertently transcribed. Although I have reviewed the note for such errors, some may still exist.     Template created by Sukhi Bonilla MD

## 2021-04-06 ENCOUNTER — BULK ORDERING (OUTPATIENT)
Dept: CASE MANAGEMENT | Facility: OTHER | Age: 46
End: 2021-04-06

## 2021-04-06 DIAGNOSIS — Z23 IMMUNIZATION DUE: ICD-10-CM

## 2021-05-30 DIAGNOSIS — F41.3 OTHER MIXED ANXIETY DISORDERS: Chronic | ICD-10-CM

## 2021-06-01 RX ORDER — CITALOPRAM 20 MG/1
TABLET ORAL
Qty: 30 TABLET | Refills: 2 | Status: SHIPPED | OUTPATIENT
Start: 2021-06-01 | End: 2021-08-30

## 2021-06-10 DIAGNOSIS — Z00.00 PREVENTATIVE HEALTH CARE: Primary | ICD-10-CM

## 2021-06-23 ENCOUNTER — OFFICE VISIT (OUTPATIENT)
Dept: INTERNAL MEDICINE | Age: 46
End: 2021-06-23

## 2021-06-23 VITALS
OXYGEN SATURATION: 99 % | BODY MASS INDEX: 20.91 KG/M2 | WEIGHT: 118 LBS | SYSTOLIC BLOOD PRESSURE: 92 MMHG | HEART RATE: 62 BPM | TEMPERATURE: 97.8 F | HEIGHT: 63 IN | DIASTOLIC BLOOD PRESSURE: 62 MMHG

## 2021-06-23 DIAGNOSIS — E03.9 HYPOTHYROIDISM, UNSPECIFIED TYPE: Chronic | ICD-10-CM

## 2021-06-23 DIAGNOSIS — D35.2 PITUITARY ADENOMA (HCC): Chronic | ICD-10-CM

## 2021-06-23 DIAGNOSIS — F41.3 OTHER MIXED ANXIETY DISORDERS: Chronic | ICD-10-CM

## 2021-06-23 DIAGNOSIS — Z00.00 ENCOUNTER FOR ANNUAL HEALTH EXAMINATION: Primary | ICD-10-CM

## 2021-06-23 PROCEDURE — 99213 OFFICE O/P EST LOW 20 MIN: CPT | Performed by: INTERNAL MEDICINE

## 2021-06-23 PROCEDURE — 99396 PREV VISIT EST AGE 40-64: CPT | Performed by: INTERNAL MEDICINE

## 2021-06-23 NOTE — ASSESSMENT & PLAN NOTE
Secondary hypothyroidism.   Continue levothyroxine 88 mcg qd.   To establish with new endocrinologist.     Lab Results   Component Value Date    TSH 0.396 06/16/2021    TSH 0.129 (L) 04/16/2018    FREET4 1.50 06/16/2021    FREET4 1.69 09/28/2020    FREET4 1.54 04/17/2019

## 2021-06-23 NOTE — PROGRESS NOTES
"    I N T E R N A L  M E D I C I N E  J U N O H  K I M,  M D      ENCOUNTER DATE:  2021    Kaia Frederick / 46 y.o. / female      CHIEF COMPLAINT     Annual Exam (7/15/20)      VITALS     Visit Vitals  BP 92/62 (BP Location: Right arm)   Pulse 62   Temp 97.8 °F (36.6 °C)   Ht 160 cm (63\")   Wt 53.5 kg (118 lb)   LMP 2021 (Approximate)   SpO2 99%   BMI 20.90 kg/m²       BP Readings from Last 3 Encounters:   21 92/62   20 104/62   20 109/64     Wt Readings from Last 3 Encounters:   21 53.5 kg (118 lb)   20 54.4 kg (120 lb)   20 53.5 kg (118 lb)      Body mass index is 20.9 kg/m².    MEDICATIONS     Current Outpatient Medications on File Prior to Visit   Medication Sig Dispense Refill   • Calcium Carb-Cholecalciferol (CALCIUM + D3 PO) Take 1 tablet by mouth daily.     • cetirizine (ZyrTEC) 10 MG tablet Take 10 mg by mouth every morning.     • Cholecalciferol (VITAMIN D) 1000 UNITS tablet Take 1,000 Units by mouth Daily.     • citalopram (CeleXA) 20 MG tablet TAKE 1 TABLET BY MOUTH EVERY DAY 30 tablet 2   • COLLAGEN PO Take  by mouth Daily.     • fluticasone (FLONASE) 50 MCG/ACT nasal spray 1 spray into each nostril 2 (Two) Times a Day. 1 bottle 5   • levothyroxine (SYNTHROID, LEVOTHROID) 88 MCG tablet Take 88 mcg by mouth Daily.     • MAGNESIUM PO Take  by mouth Daily.     • vitamin B-12 (CYANOCOBALAMIN) 1000 MCG tablet Take 1,000 mcg by mouth Daily.     • BIOTIN PO Take  by mouth.     • Probiotic Product (PROBIOTIC DAILY PO) Take  by mouth.     • Turmeric 500 MG capsule Take 1 capsule by mouth Daily.       No current facility-administered medications on file prior to visit.         HISTORY OF PRESENT ILLNESS     Kaia presents for annual health maintenance visit.  She has chronic anxiety disorder and is taking citalopram 20 mg qd.   Her mother  in January from a stroke.   Emotionally doing okay at this time.   She is establishing with a different endocrinologist. On " levothyroxine 88 mcg for secondary hypothyroidism from pituitary adenoma.   She reports change/irregularlity with menstrual cycle.     · Last health maintenance visit: approximately 1 year ago  · General health: some medical problems  · Lifestyle:  · Attempting to lose weight?: No   · Diet: eats a well balanced, healthy diet  · Exercise: exercises 5 days/week  · Tobacco: Never used   · Alcohol: regular (moderate)  · Work: Not working and currently not looking  · Reproductive health:  · Sexually active?: Yes   · Sexual problems?: No problems  · Concern for STD?: No    · Sees Gynecologist?: Yes   · Carmina/Postmenopausal?: No   · Domestic abuse concerns: No   · Depression Screening:      PHQ-2/PHQ-9 Depression Screening 6/23/2021   Little interest or pleasure in doing things 0   Feeling down, depressed, or hopeless 0   Total Score 0         PHQ-2: 0 (Not depressed)   PHQ-9: 0 (Negative screening for depression)    Patient Care Team:  Mason Bonilla MD as PCP - General (Internal Medicine)  Nova Guevara MD as Consulting Physician (Endocrinology)  Yael Carson MD (Dermatology)  Ping Cisneros MD as Consulting Physician (Obstetrics and Gynecology)      ALLERGIES  No Known Allergies     PFSH:     The following portions of the patient's history were reviewed and updated as appropriate: Allergies / Current Medications / Past Medical History / Surgical History / Social History / Family History    PROBLEM LIST   Patient Active Problem List   Diagnosis   • Insomnia   • Iron deficiency anemia   • Vitamin D deficiency   • Hypothyroidism   • Pituitary adenoma (CMS/HCC)   • Other mixed anxiety disorders   • Chronic allergic rhinitis   • TMJ (temporomandibular joint disorder)   • Other irritable bowel syndrome       PAST MEDICAL HISTORY  Past Medical History:   Diagnosis Date   • Acute appendicitis with localized peritonitis 7/21/2017   • Anxiety    • Asthma    • Depression    • Hypothyroidism    • Lumbar canal stenosis  8/16/2016   • Migraine     RARE   • Pituitary adenoma (CMS/HCC)     benign   • Spinal stenosis    • Spondylolisthesis of lumbar region 8/16/2016       SURGICAL HISTORY  Past Surgical History:   Procedure Laterality Date   • ABDOMINOPLASTY N/A 02/12/2016    Dr. Isidro Nguyen   • APPENDECTOMY N/A 7/21/2017    Procedure: APPENDECTOMY LAPAROSCOPIC;  Surgeon: Leticia Elizabeth MD;  Location: [facility] MAIN OR;  Service:    • BREAST AUGMENTATION Bilateral 02/12/2016    Dr. Isidro Nguyen   • DENTAL EXAMINATION UNDER ANESTHESIA     • LUMBAR DISCECTOMY FUSION INSTRUMENTATION N/A 9/26/2016    Procedure: L4-5  fusion with instrumentation;  Surgeon: Isidro Dietz MD;  Location: [facility] MAIN OR;  Service:    • LUMBAR LAMINECTOMY DISCECTOMY DECOMPRESSION N/A 9/26/2016    Procedure: L4-L5 LUMBAR LAMINECTOMY;  Surgeon: Darrell Olvera MD;  Location: [facility] MAIN OR;  Service:    • SCAR REVISION BREAST Bilateral 4/29/2016    Procedure: REVISION GABBY BREAST, REMOVE AND REPLACE RIGHT BREAST IMPLANT;  Surgeon: Isidro Nguyen MD;  Location: [facility] MAIN OR;  Service:    • WISDOM TOOTH EXTRACTION Bilateral        SOCIAL HISTORY  Social History     Socioeconomic History   • Marital status:      Spouse name: Fidel   • Number of children: 3   • Years of education: Bachelors   • Highest education level: Not on file   Tobacco Use   • Smoking status: Never Smoker   • Smokeless tobacco: Never Used   Vaping Use   • Vaping Use: Never used   Substance and Sexual Activity   • Alcohol use: Yes     Alcohol/week: 3.0 - 5.0 standard drinks     Types: 3 - 5 Glasses of wine per week   • Drug use: No   • Sexual activity: Yes     Partners: Male     Comment:  had vasectomy       FAMILY HISTORY  Family History   Problem Relation Age of Onset   • Lymphoma Father         CLL   • Hypothyroidism Father    • Colon polyps Father    • Breast cancer Mother 59   • Bipolar disorder Mother         type 1   • Stroke Mother 74   • Diabetes type II Mother     • Skin cancer Sister    • Melanoma Sister    • Coronary artery disease Brother 53   • Drug abuse Brother    • Heart failure Maternal Grandmother    • Coronary artery disease Maternal Grandmother    • Bipolar disorder Maternal Grandmother    • Coronary artery disease Maternal Grandfather    • Diabetes Paternal Grandmother    • Prostate cancer Paternal Grandfather    • Graves' disease Paternal Aunt    • Abnormal EKG Paternal Aunt    • Alcohol abuse Maternal Uncle    • Drug abuse Maternal Uncle    • Thyroid cancer Other    • Colon cancer Neg Hx        IMMUNIZATION HISTORY  Immunization History   Administered Date(s) Administered   • COVID-19 (MODERNA) 04/03/2021, 05/01/2021   • Flu Vaccine Quad PF 6-35MO 10/16/2017, 10/01/2018, 10/17/2019   • Flulaval/Fluarix/Fluzone Quad 10/09/2019, 10/15/2019, 10/16/2019, 09/28/2020   • Hepatitis A 03/21/2018, 04/27/2018, 10/31/2018         REVIEW OF SYSTEMS     Review of Systems   Constitutional: Negative.    HENT: Negative.    Eyes: Negative.    Respiratory: Negative.    Cardiovascular: Negative.    Gastrointestinal: Negative.    Endocrine: Negative.    Genitourinary: Negative.  Menstrual problem: change in regularity.   Musculoskeletal: Negative.    Skin: Negative.    Allergic/Immunologic: Negative.    Neurological: Negative.    Hematological: Negative.    Psychiatric/Behavioral: The patient is nervous/anxious.        PHYSICAL EXAMINATION     Physical Exam  Constitutional:       General: She is not in acute distress.     Appearance: She is well-developed.   HENT:      Head: Normocephalic and atraumatic.      Right Ear: Tympanic membrane, ear canal and external ear normal.      Left Ear: Tympanic membrane, ear canal and external ear normal.   Eyes:      General: No scleral icterus.     Conjunctiva/sclera: Conjunctivae normal.      Pupils: Pupils are equal, round, and reactive to light.   Neck:      Thyroid: No thyroid mass or thyromegaly.      Trachea: No tracheal deviation.    Cardiovascular:      Rate and Rhythm: Normal rate and regular rhythm.      Pulses: Normal pulses.      Heart sounds: Normal heart sounds.   Pulmonary:      Effort: Pulmonary effort is normal.      Breath sounds: Normal breath sounds.   Abdominal:      General: There is no distension.      Palpations: Abdomen is soft. There is no mass.      Tenderness: There is no abdominal tenderness.      Hernia: No hernia is present.   Genitourinary:     Comments: Deferred to gyne/by patient unless specified otherwise.   Musculoskeletal:         General: No deformity.      Cervical back: Normal range of motion and neck supple.      Right lower leg: No edema.      Left lower leg: No edema.   Skin:     General: Skin is warm.      Coloration: Skin is not pale.      Findings: No rash.   Neurological:      General: No focal deficit present.      Mental Status: She is alert and oriented to person, place, and time.      Cranial Nerves: No cranial nerve deficit.      Motor: No abnormal muscle tone.      Coordination: Coordination normal.      Deep Tendon Reflexes: Reflexes are normal and symmetric.   Psychiatric:         Mood and Affect: Mood normal.         Behavior: Behavior normal.         Thought Content: Thought content normal.         Judgment: Judgment normal.         REVIEWED DATA      Labs:    Lab Results   Component Value Date     06/16/2021    K 4.5 06/16/2021    CALCIUM 9.2 06/16/2021    AST 13 06/16/2021    ALT 11 06/16/2021    BUN 16 06/16/2021    CREATININE 0.85 06/16/2021    CREATININE 0.68 07/22/2017    CREATININE 0.66 07/21/2017    EGFRIFNONA 73 06/16/2021    EGFRIFAFRI 88 06/16/2021       Lab Results   Component Value Date    GLU 89 06/16/2021    HGBA1C 4.80 06/16/2021    TSH 0.396 06/16/2021    FREET4 1.50 06/16/2021       Lab Results   Component Value Date    LDL 64 06/16/2021    HDL 80 (H) 06/16/2021    TRIG 108 06/16/2021    CHOLHDLRATIO 2.04 06/16/2021       Lab Results   Component Value Date    KXCB07PP 58  09/28/2020        Lab Results   Component Value Date    WBC 7.54 06/16/2021    HGB 13.3 06/16/2021    MCV 95.5 06/16/2021     06/16/2021       Lab Results   Component Value Date    PROTEIN Negative 06/16/2021    GLUCOSEU Negative 06/16/2021    BLOODU Negative 06/16/2021    NITRITEU Negative 06/16/2021    LEUKOCYTESUR See below: (A) 06/16/2021          Lab Results   Component Value Date    HEPCVIRUSABY <0.1 06/16/2021       Imaging:        Medical Tests:        ASSESSMENT & PLAN     ANNUAL WELLNESS EXAM / PHYSICAL     Other medical problems addressed today:  Problem List Items Addressed This Visit        High    Other mixed anxiety disorders (Chronic)    Current Assessment & Plan     Mother passed away from stroke in January.   Doing okay emotionally.   Continue citalopram 20 mg qd.          Relevant Medications    citalopram (CeleXA) 20 MG tablet       Medium    Hypothyroidism (Chronic)    Overview     *Endocrine         Current Assessment & Plan     Secondary hypothyroidism.   Continue levothyroxine 88 mcg qd.   To establish with new endocrinologist.     Lab Results   Component Value Date    TSH 0.396 06/16/2021    TSH 0.129 (L) 04/16/2018    FREET4 1.50 06/16/2021    FREET4 1.69 09/28/2020    FREET4 1.54 04/17/2019             Relevant Medications    levothyroxine (SYNTHROID, LEVOTHROID) 88 MCG tablet       Low    Pituitary adenoma (CMS/HCC) (Chronic)    Overview     *Sees endocrinologist         Current Assessment & Plan     Suspect prolactinoma.   To establish with a new endocrinologist.            Other Visit Diagnoses     Encounter for annual health examination    -  Primary          Summary/Discussion:     • Primary reason for today's visit was for annual health examination. However above active medical issues also needed to be addressed today.      Next Appointment with me: Visit date not found    Return in about 6 months (around 12/23/2021) for Reassess chronic medical problems.      HEALTHCARE  MAINTENANCE ISSUES     Cancer Screening:  · Colon: Initial/Next screening at age: 47  · Repeat colon cancer screening: every 5 years  · Breast: Recommended monthly self exams; annual professional exam  · Mammogram: every 1 year  · Cervical: 2 years or 3 years  · Skin: Monthly self skin examination, annual exam by health professional  · Lung: Does not meet criteria for lung cancer screening.   · Other:    Screening Labs & Tests:  · Lab results reviewed & discussed with the patient or test orders placed today.  · EKG:  · CV Screening: Lipid panel  · DEXA (65+ or postmenopausal with risk factors):   · HEP C (If born [birth year]0635, or risk factors): Negative screen  · Other:     Immunization/Vaccinations (to be given today unless deferred by patient)  · Influenza: Recommended annual influenza vaccine  · Hepatitis A: Up to date  · Hepatitis B: Not needed at this time  · Tetanus/Pertussis: Recommended here or at pharmacy and Verify immunization records  · Pneumovax: Not needed at this time  · Shingles: Not needed at this time  · COVID: FELECIA RAPP COVID: Completed the vaccine series.     Lifestyle Counseling:  · Lifestyle Modifications: Continue good lifestyle choices/modifications  · Safety Issues: Always wear seatbelt, Avoid texting while driving   · Use sunscreen, regular skin examination  · Recommended annual dental/vision examination.  · Emotional/Stress/Sleep: Reviewed and  given when appropriate      Health Maintenance   Topic Date Due   • TDAP/TD VACCINES (1 - Tdap) Never done   • MAMMOGRAM  07/15/2021   • ANNUAL PHYSICAL  07/16/2021   • INFLUENZA VACCINE  08/01/2021   • PAP SMEAR  07/17/2022   • HEPATITIS C SCREENING  Completed   • COVID-19 Vaccine  Completed   • Pneumococcal Vaccine 0-64  Aged Out         *Examiner was wearing KN95 mask, face shield and exam gloves during the entire duration of the visit. Patient was masked the entire time.   Minimum social distance of 6 ft maintained entire visit except if  physical contact was necessary as documented.      **Nikolai Disclaimer:   Much of this encounter note is an electronic transcription/translation of spoken language to printed text. The electronic translation of spoken language may permit erroneous, or at times, nonsensical words or phrases to be inadvertently transcribed. Although I have reviewed the note for such errors, some may still exist.    Template created by Sukhi Bonilla MD

## 2021-08-28 DIAGNOSIS — F41.3 OTHER MIXED ANXIETY DISORDERS: Chronic | ICD-10-CM

## 2021-08-30 RX ORDER — CITALOPRAM 20 MG/1
TABLET ORAL
Qty: 30 TABLET | Refills: 5 | Status: SHIPPED | OUTPATIENT
Start: 2021-08-30 | End: 2022-01-24 | Stop reason: SDUPTHER

## 2022-01-14 ENCOUNTER — OFFICE VISIT (OUTPATIENT)
Dept: INTERNAL MEDICINE | Age: 47
End: 2022-01-14

## 2022-01-14 VITALS
HEIGHT: 63 IN | BODY MASS INDEX: 20.91 KG/M2 | HEART RATE: 64 BPM | OXYGEN SATURATION: 98 % | TEMPERATURE: 97.8 F | DIASTOLIC BLOOD PRESSURE: 60 MMHG | SYSTOLIC BLOOD PRESSURE: 92 MMHG | WEIGHT: 118 LBS

## 2022-01-14 DIAGNOSIS — E03.9 HYPOTHYROIDISM, UNSPECIFIED TYPE: Primary | Chronic | ICD-10-CM

## 2022-01-14 DIAGNOSIS — F41.3 OTHER MIXED ANXIETY DISORDERS: Chronic | ICD-10-CM

## 2022-01-14 PROCEDURE — 90471 IMMUNIZATION ADMIN: CPT | Performed by: INTERNAL MEDICINE

## 2022-01-14 PROCEDURE — 90715 TDAP VACCINE 7 YRS/> IM: CPT | Performed by: INTERNAL MEDICINE

## 2022-01-14 PROCEDURE — 99213 OFFICE O/P EST LOW 20 MIN: CPT | Performed by: INTERNAL MEDICINE

## 2022-01-14 NOTE — PROGRESS NOTES
"    I N T E R N A L  M E D I C I N E  J U N O H  K I M,  M D      ENCOUNTER DATE:  01/14/2022    Kaia Mack / 46 y.o. / female      CHIEF COMPLAINT / REASON FOR OFFICE VISIT     Other mixed anxiety disorders, Hypothyroidism, and Pituitary Adenoma      ASSESSMENT & PLAN     Problem List Items Addressed This Visit        High    Other mixed anxiety disorders (Chronic)    Current Assessment & Plan     Overall stable. Continue citalopram 20 mg qd.          Relevant Medications    citalopram (CeleXA) 20 MG tablet       Medium    Hypothyroidism - Primary (Chronic)    Overview     *Endocrine         Relevant Medications    levothyroxine (SYNTHROID, LEVOTHROID) 88 MCG tablet        Orders Placed This Encounter   Procedures   • Tdap Vaccine Greater Than or Equal To 6yo IM     No orders of the defined types were placed in this encounter.      SUMMARY/DISCUSSION  •       Next Appointment with me: Visit date not found    Return in about 6 months (around 7/14/2022) for Reassess chronic medical problems.      VITAL SIGNS     Visit Vitals  BP 92/60 (BP Location: Left arm)   Pulse 64   Temp 97.8 °F (36.6 °C)   Ht 160 cm (63\")   Wt 53.5 kg (118 lb)   LMP 01/06/2022 (Exact Date)   SpO2 98%   BMI 20.90 kg/m²       BP Readings from Last 3 Encounters:   01/14/22 92/60   06/23/21 92/62   12/18/20 104/62     Wt Readings from Last 3 Encounters:   01/14/22 53.5 kg (118 lb)   06/23/21 53.5 kg (118 lb)   12/18/20 54.4 kg (120 lb)     Body mass index is 20.9 kg/m².      MEDICATIONS AT THE TIME OF OFFICE VISIT     Current Outpatient Medications on File Prior to Visit   Medication Sig   • Calcium Carb-Cholecalciferol (CALCIUM + D3 PO) Take 1 tablet by mouth daily.   • cetirizine (ZyrTEC) 10 MG tablet Take 10 mg by mouth every morning.   • Cholecalciferol (VITAMIN D) 1000 UNITS tablet Take 1,000 Units by mouth Daily.   • citalopram (CeleXA) 20 MG tablet TAKE 1 TABLET BY MOUTH EVERY DAY   • COLLAGEN PO Take  by mouth Daily.   • ELDERBERRY PO " Take  by mouth.   • fluticasone (FLONASE) 50 MCG/ACT nasal spray 1 spray into each nostril 2 (Two) Times a Day.   • levothyroxine (SYNTHROID, LEVOTHROID) 88 MCG tablet Take 88 mcg by mouth Daily.   • MAGNESIUM PO Take  by mouth Daily.   • Multiple Vitamins-Minerals (ZINC PO) Take  by mouth.   • vitamin B-12 (CYANOCOBALAMIN) 1000 MCG tablet Take 1,000 mcg by mouth Daily.   • BIOTIN PO Take  by mouth.   • Probiotic Product (PROBIOTIC DAILY PO) Take  by mouth.   • Turmeric 500 MG capsule Take 1 capsule by mouth Daily.     No current facility-administered medications on file prior to visit.          HISTORY OF PRESENT ILLNESS     Depression overall stable on citalopram. Had some emotional stressor with daughter (depression).   Sees endocrinologist for hypothyroidism and pituitary adenoma.       Patient Care Team:  Mason Bonilla MD as PCP - General (Internal Medicine)  Nova Guevara MD as Consulting Physician (Endocrinology)  Yael Carson MD (Dermatology)  Ping Cisneros MD as Consulting Physician (Obstetrics and Gynecology)    REVIEW OF SYSTEMS     Review of Systems   Constitutional neg except per HPI   No significant change in mood or cognition     PHYSICAL EXAMINATION     Physical Exam  Psych: Normal mood and affect. Normal thought and judgment.       REVIEWED DATA     Labs:     Lab Results   Component Value Date     06/16/2021    K 4.5 06/16/2021    CALCIUM 9.2 06/16/2021    AST 13 06/16/2021    ALT 11 06/16/2021    BUN 16 06/16/2021    CREATININE 0.85 06/16/2021    CREATININE 0.68 07/22/2017    CREATININE 0.66 07/21/2017    EGFRIFNONA 73 06/16/2021    EGFRIFAFRI 88 06/16/2021       Lab Results   Component Value Date    HGBA1C 4.80 06/16/2021       Lab Results   Component Value Date    LDL 64 06/16/2021    HDL 80 (H) 06/16/2021    TRIG 108 06/16/2021       Lab Results   Component Value Date    TSH 0.032 (L) 09/07/2021    TSH 0.396 06/16/2021    TSH 0.129 (L) 04/16/2018    FREET4 1.29 09/07/2021     FREET4 1.50 06/16/2021    FREET4 1.69 09/28/2020       Lab Results   Component Value Date    WBC 4.76 08/12/2021    HGB 13.8 08/12/2021     08/12/2021       No results found for: MICROALBUR        Imaging:           Medical Tests:           Summary of old records / correspondence / consultant report:           Request outside records:             *Examiner was wearing KN95 mask and eye protection during the entire duration of the visit. Patient was masked the entire time. Minimum social distance of 6 ft maintained entire visit except if physical contact was necessary as documented.     **Dragon Disclaimer: Much of this encounter note is an electronic transcription/translation of spoken language to printed text. The electronic translation of spoken language may permit erroneous, or at times, nonsensical words or phrases to be inadvertently transcribed. Although I have reviewed the note for such errors, some may still exist.       Template created by Sukhi Bonilla MD

## 2022-01-24 DIAGNOSIS — F41.3 OTHER MIXED ANXIETY DISORDERS: Chronic | ICD-10-CM

## 2022-01-24 RX ORDER — CITALOPRAM 20 MG/1
20 TABLET ORAL DAILY
Qty: 90 TABLET | Refills: 1 | Status: SHIPPED | OUTPATIENT
Start: 2022-01-24 | End: 2022-03-14

## 2022-03-14 DIAGNOSIS — F41.3 OTHER MIXED ANXIETY DISORDERS: Chronic | ICD-10-CM

## 2022-03-14 RX ORDER — CITALOPRAM 20 MG/1
20 TABLET ORAL DAILY
Qty: 30 TABLET | Refills: 5 | Status: SHIPPED | OUTPATIENT
Start: 2022-03-14 | End: 2022-09-13 | Stop reason: SDUPTHER

## 2022-03-14 RX ORDER — CITALOPRAM 20 MG/1
TABLET ORAL
Qty: 30 TABLET | Refills: 5 | Status: SHIPPED | OUTPATIENT
Start: 2022-03-14 | End: 2022-03-14 | Stop reason: SDUPTHER

## 2022-03-23 ENCOUNTER — OFFICE VISIT (OUTPATIENT)
Dept: INTERNAL MEDICINE | Age: 47
End: 2022-03-23

## 2022-03-23 VITALS
WEIGHT: 118 LBS | TEMPERATURE: 98 F | HEIGHT: 63 IN | OXYGEN SATURATION: 98 % | SYSTOLIC BLOOD PRESSURE: 100 MMHG | DIASTOLIC BLOOD PRESSURE: 36 MMHG | BODY MASS INDEX: 20.91 KG/M2 | HEART RATE: 69 BPM

## 2022-03-23 DIAGNOSIS — S20.211A CONTUSION OF RIB ON RIGHT SIDE, INITIAL ENCOUNTER: ICD-10-CM

## 2022-03-23 DIAGNOSIS — J30.1 HAYFEVER: Primary | ICD-10-CM

## 2022-03-23 PROCEDURE — 99213 OFFICE O/P EST LOW 20 MIN: CPT | Performed by: INTERNAL MEDICINE

## 2022-03-23 NOTE — PROGRESS NOTES
"    I N T E R N A L  M E D I C I N E  J U N O H  K I M,  M D      ENCOUNTER DATE:  03/23/2022    Kaia Mack / 46 y.o. / female      CHIEF COMPLAINT / REASON FOR OFFICE VISIT     Cough (1 week , nasal drainage . Covid home test Friday was negative / ) and Rib Pain (Right side . Done workout )      ASSESSMENT & PLAN     1. Hayfever    2. Contusion of rib on right side, initial encounter      No orders of the defined types were placed in this encounter.    No orders of the defined types were placed in this encounter.      SUMMARY/DISCUSSION  • She likely has hayfever which started about a week ago.  Continue cetirizine 10 mg daily but start using Flonase daily.  Use simply saline sinus irrigation twice daily.  Call for worsening symptoms.  May also take Mucinex DM as needed for chest congestion and cough.  • Likely rib contusion/sprain of the right side.  Avoid aggravating physical activities.  Use ThermaCare heating pads as needed.  Take Advil or Aleve as needed.  Call for any worsening symptoms.  Consider x-ray of the ribs if worsening or not improving.      Next Appointment with me: 7/22/2022    No follow-ups on file.        VITAL SIGNS     Visit Vitals  BP (!) 100/36 (BP Location: Left arm)   Pulse 69   Temp 98 °F (36.7 °C)   Ht 160 cm (63\")   Wt 53.5 kg (118 lb)   LMP 03/19/2022 (Exact Date)   SpO2 98%   BMI 20.90 kg/m²       BP Readings from Last 3 Encounters:   03/23/22 (!) 100/36   01/14/22 92/60   06/23/21 92/62     Wt Readings from Last 3 Encounters:   03/23/22 53.5 kg (118 lb)   01/14/22 53.5 kg (118 lb)   06/23/21 53.5 kg (118 lb)     Body mass index is 20.9 kg/m².    Blood pressure readings recorded on patient flowsheet:  No flowsheet data found.     MEDICATIONS AT THE TIME OF OFFICE VISIT     Current Outpatient Medications on File Prior to Visit   Medication Sig   • Calcium Carb-Cholecalciferol (CALCIUM + D3 PO) Take 1 tablet by mouth daily.   • cetirizine (ZyrTEC) 10 MG tablet Take 10 mg by mouth " every morning.   • Cholecalciferol (VITAMIN D) 1000 UNITS tablet Take 1,000 Units by mouth Daily.   • citalopram (CeleXA) 20 MG tablet Take 1 tablet by mouth Daily.   • COLLAGEN PO Take  by mouth Daily.   • ELDERBERRY PO Take  by mouth.   • levothyroxine (SYNTHROID, LEVOTHROID) 88 MCG tablet Take 88 mcg by mouth Daily.   • MAGNESIUM PO Take  by mouth Daily.   • Multiple Vitamins-Minerals (ZINC PO) Take  by mouth.   • Rhubarb (ESTROVEN COMPLETE PO) Take  by mouth Daily.   • vitamin B-12 (CYANOCOBALAMIN) 1000 MCG tablet Take 1,000 mcg by mouth Daily.   • BIOTIN PO Take  by mouth.   • fluticasone (FLONASE) 50 MCG/ACT nasal spray 1 spray into each nostril 2 (Two) Times a Day.   • Probiotic Product (PROBIOTIC DAILY PO) Take  by mouth.   • Turmeric 500 MG capsule Take 1 capsule by mouth Daily.     No current facility-administered medications on file prior to visit.          HISTORY OF PRESENT ILLNESS     About 7 to 10 days of sinus congestion, postnasal drainage and cough with mild phlegm production.  No fever or chills or shortness of breath.  Tested negative for Covid at home.  She reports history of seasonal allergies and currently takes Zyrtec but not using Flonase.    Last week she was doing some type of bungee activity in a harness and injured her right rib/chest area.  Denies shortness of breath or severe pain but has persistent discomfort.  She went to exercise today and with twisting activity the pain seems to be somewhat worse.        REVIEW OF SYSTEMS     No fever, chills, sinus pressure/pain, or shortness of breath/wheezing  Right rib/chest/mid back area pain         PHYSICAL EXAMINATION     Physical Exam  Constitutional:       General: She is not in acute distress.     Appearance: She is not ill-appearing.   Eyes:      Conjunctiva/sclera: Conjunctivae normal.   Pulmonary:      Effort: Pulmonary effort is normal.      Breath sounds: Normal breath sounds. No decreased breath sounds, wheezing, rhonchi or rales.    Musculoskeletal:        Back:    Neurological:      Mental Status: She is alert.             REVIEWED DATA     Labs:     Lab Results   Component Value Date     06/16/2021    K 4.5 06/16/2021    CALCIUM 9.2 06/16/2021    AST 13 06/16/2021    ALT 11 06/16/2021    BUN 16 06/16/2021    CREATININE 0.85 06/16/2021    CREATININE 0.68 07/22/2017    CREATININE 0.66 07/21/2017    EGFRIFNONA 73 06/16/2021    EGFRIFAFRI 88 06/16/2021       Lab Results   Component Value Date    HGBA1C 4.80 06/16/2021       Lab Results   Component Value Date    LDL 64 06/16/2021    HDL 80 (H) 06/16/2021    TRIG 108 06/16/2021       Lab Results   Component Value Date    TSH 0.148 (L) 03/03/2022    TSH 0.032 (L) 09/07/2021    TSH 0.396 06/16/2021    FREET4 1.30 03/03/2022    FREET4 1.29 09/07/2021    FREET4 1.50 06/16/2021       Lab Results   Component Value Date    WBC 4.76 08/12/2021    HGB 13.8 08/12/2021     08/12/2021       No results found for: MICROALBUR        Imaging:           Medical Tests:           Summary of old records / correspondence / consultant report:           Request outside records:             *Examiner was wearing KN95 mask and eye protection during the entire duration of the visit. Patient was masked the entire time. Minimum social distance of 6 ft maintained entire visit except if physical contact was necessary as documented.       Template created by Sukhi Bonilla MD

## 2022-07-22 ENCOUNTER — OFFICE VISIT (OUTPATIENT)
Dept: INTERNAL MEDICINE | Age: 47
End: 2022-07-22

## 2022-07-22 VITALS
HEIGHT: 63 IN | SYSTOLIC BLOOD PRESSURE: 94 MMHG | WEIGHT: 117 LBS | DIASTOLIC BLOOD PRESSURE: 60 MMHG | BODY MASS INDEX: 20.73 KG/M2 | TEMPERATURE: 97.8 F | OXYGEN SATURATION: 96 % | HEART RATE: 66 BPM

## 2022-07-22 DIAGNOSIS — F41.3 OTHER MIXED ANXIETY DISORDERS: Primary | Chronic | ICD-10-CM

## 2022-07-22 DIAGNOSIS — Z12.11 SCREENING FOR COLON CANCER: ICD-10-CM

## 2022-07-22 PROCEDURE — 99213 OFFICE O/P EST LOW 20 MIN: CPT | Performed by: INTERNAL MEDICINE

## 2022-07-22 NOTE — PROGRESS NOTES
"    I N T E R N A L  M E D I C I N E  J U N O H  K I M,  M D      ENCOUNTER DATE:  07/22/2022    Kaia Mack / 47 y.o. / female      CHIEF COMPLAINT / REASON FOR OFFICE VISIT     Hypothyroidism and Other mixed anxiety disorders      ASSESSMENT & PLAN     Problem List Items Addressed This Visit        High    Other mixed anxiety disorders - Primary (Chronic)    Overview     Continue citalopram 20 mg daily           Relevant Medications    citalopram (CeleXA) 20 MG tablet      Other Visit Diagnoses     Screening for colon cancer        Relevant Orders    Ambulatory Referral For Screening Colonoscopy        Orders Placed This Encounter   Procedures   • Ambulatory Referral For Screening Colonoscopy     No orders of the defined types were placed in this encounter.      SUMMARY/DISCUSSION  •       Next Appointment with me: Visit date not found    Return in about 6 months (around 1/22/2023) for Anxiety, TELE-HEALTH VISIT.      VITAL SIGNS     Vitals:    07/22/22 0837   BP: 94/60   BP Location: Left arm   Pulse: 66   Temp: 97.8 °F (36.6 °C)   SpO2: 96%   Weight: 53.1 kg (117 lb)   Height: 160 cm (63\")       BP Readings from Last 3 Encounters:   07/22/22 94/60   03/23/22 (!) 100/36   01/14/22 92/60     Wt Readings from Last 3 Encounters:   07/22/22 53.1 kg (117 lb)   03/23/22 53.5 kg (118 lb)   01/14/22 53.5 kg (118 lb)     Body mass index is 20.73 kg/m².    Blood pressure readings recorded on patient flowsheet:  No flowsheet data found.       MEDICATIONS AT THE TIME OF OFFICE VISIT     Current Outpatient Medications on File Prior to Visit   Medication Sig   • Calcium Carb-Cholecalciferol (CALCIUM + D3 PO) Take 1 tablet by mouth daily.   • cetirizine (ZyrTEC) 10 MG tablet Take 10 mg by mouth every morning.   • Cholecalciferol (VITAMIN D) 1000 UNITS tablet Take 1,000 Units by mouth Daily.   • citalopram (CeleXA) 20 MG tablet Take 1 tablet by mouth Daily.   • COLLAGEN PO Take  by mouth Daily.   • ELDERBERRY PO Take  by " mouth.   • fluticasone (FLONASE) 50 MCG/ACT nasal spray 1 spray into each nostril 2 (Two) Times a Day.   • levothyroxine (SYNTHROID, LEVOTHROID) 88 MCG tablet Take 88 mcg by mouth Daily.   • MAGNESIUM PO Take  by mouth Daily.   • Multiple Vitamins-Minerals (ZINC PO) Take  by mouth.   • [DISCONTINUED] Turmeric 500 MG capsule Take 1 capsule by mouth Daily.   • vitamin B-12 (CYANOCOBALAMIN) 1000 MCG tablet Take 1,000 mcg by mouth Daily.   • [DISCONTINUED] BIOTIN PO Take  by mouth.   • [DISCONTINUED] Probiotic Product (PROBIOTIC DAILY PO) Take  by mouth.   • [DISCONTINUED] Rhubarb (ESTROVEN COMPLETE PO) Take  by mouth Daily.     No current facility-administered medications on file prior to visit.          HISTORY OF PRESENT ILLNESS     She is on citalopram 20 mg daily for anxiety disorder and remains clinically stable without any symptoms of depression or worsening anxiety.  She recently went on a mission trip to [place] and had some back pain flareup without worsening sciatica.  She has history of lumbar decompression in 2016.  Fortunately her pain level has improved and she is back to baseline.  She sees her endocrinologist for hypopituitarism/hypothyroidism.      Patient Care Team:  Mason Bonilla MD as PCP - General (Internal Medicine)  Nova Guevara MD as Consulting Physician (Endocrinology)  Yael Carson MD (Dermatology)  Ping Cisneros MD as Consulting Physician (Obstetrics and Gynecology)    REVIEW OF SYSTEMS     Review of Systems       PHYSICAL EXAMINATION     Physical Exam  Alert with normal thought and judgment.   Cardiovascular: Normal rate, regular rhythm.       REVIEWED DATA     Labs:     Lab Results   Component Value Date     06/16/2021    K 4.5 06/16/2021    CALCIUM 9.2 06/16/2021    AST 13 06/16/2021    ALT 11 06/16/2021    BUN 16 06/16/2021    CREATININE 0.85 06/16/2021    CREATININE 0.68 07/22/2017    CREATININE 0.66 07/21/2017    EGFRIFNONA 73 06/16/2021    EGFRIFAFRI 88 06/16/2021        Lab Results   Component Value Date    HGBA1C 4.80 06/16/2021       Lab Results   Component Value Date    LDL 64 06/16/2021    HDL 80 (H) 06/16/2021    TRIG 108 06/16/2021       Lab Results   Component Value Date    TSH 0.148 (L) 03/03/2022    TSH 0.032 (L) 09/07/2021    TSH 0.396 06/16/2021    FREET4 1.30 03/03/2022    FREET4 1.29 09/07/2021    FREET4 1.50 06/16/2021       Lab Results   Component Value Date    WBC 4.76 08/12/2021    HGB 13.8 08/12/2021     08/12/2021       No results found for: MALBCRERATIO       Imaging:           Medical Tests:           Summary of old records / correspondence / consultant report:           Request outside records:             *Examiner was wearing KN95 mask and eye protection during the entire duration of the visit. Patient was masked the entire time. Minimum social distance of 6 ft maintained entire visit except if physical contact was necessary as documented.       Template created by Sukhi Bonilla MD

## 2022-08-09 ENCOUNTER — PREP FOR SURGERY (OUTPATIENT)
Dept: OTHER | Facility: HOSPITAL | Age: 47
End: 2022-08-09

## 2022-08-09 DIAGNOSIS — Z12.11 SCREENING FOR COLON CANCER: Primary | ICD-10-CM

## 2022-08-18 PROBLEM — Z12.11 SCREENING FOR COLON CANCER: Status: ACTIVE | Noted: 2022-08-18

## 2022-09-13 DIAGNOSIS — F41.3 OTHER MIXED ANXIETY DISORDERS: Chronic | ICD-10-CM

## 2022-09-13 RX ORDER — CITALOPRAM 20 MG/1
20 TABLET ORAL DAILY
Qty: 30 TABLET | Refills: 5 | Status: SHIPPED | OUTPATIENT
Start: 2022-09-13 | End: 2023-03-16 | Stop reason: SDUPTHER

## 2022-09-29 ENCOUNTER — ANESTHESIA EVENT (OUTPATIENT)
Dept: GASTROENTEROLOGY | Facility: HOSPITAL | Age: 47
End: 2022-09-29

## 2022-09-29 ENCOUNTER — ANESTHESIA (OUTPATIENT)
Dept: GASTROENTEROLOGY | Facility: HOSPITAL | Age: 47
End: 2022-09-29

## 2022-09-29 ENCOUNTER — HOSPITAL ENCOUNTER (OUTPATIENT)
Facility: HOSPITAL | Age: 47
Setting detail: HOSPITAL OUTPATIENT SURGERY
Discharge: HOME | End: 2022-09-29
Attending: SURGERY | Admitting: SURGERY

## 2022-09-29 VITALS
DIASTOLIC BLOOD PRESSURE: 62 MMHG | OXYGEN SATURATION: 100 % | HEART RATE: 45 BPM | BODY MASS INDEX: 20.29 KG/M2 | RESPIRATION RATE: 16 BRPM | HEIGHT: 63 IN | WEIGHT: 114.5 LBS | SYSTOLIC BLOOD PRESSURE: 97 MMHG

## 2022-09-29 DIAGNOSIS — Z12.11 SCREENING FOR COLON CANCER: ICD-10-CM

## 2022-09-29 PROBLEM — K63.5 COLON POLYPS: Status: ACTIVE | Noted: 2022-09-29

## 2022-09-29 PROCEDURE — S0260 H&P FOR SURGERY: HCPCS | Performed by: SURGERY

## 2022-09-29 PROCEDURE — 45380 COLONOSCOPY AND BIOPSY: CPT | Performed by: SURGERY

## 2022-09-29 PROCEDURE — 88305 TISSUE EXAM BY PATHOLOGIST: CPT | Performed by: SURGERY

## 2022-09-29 PROCEDURE — 45385 COLONOSCOPY W/LESION REMOVAL: CPT | Performed by: SURGERY

## 2022-09-29 PROCEDURE — 25010000002 PROPOFOL 10 MG/ML EMULSION: Performed by: ANESTHESIOLOGY

## 2022-09-29 RX ORDER — LIDOCAINE HYDROCHLORIDE 20 MG/ML
INJECTION, SOLUTION INFILTRATION; PERINEURAL AS NEEDED
Status: DISCONTINUED | OUTPATIENT
Start: 2022-09-29 | End: 2022-09-29 | Stop reason: SURG

## 2022-09-29 RX ORDER — PROPOFOL 10 MG/ML
VIAL (ML) INTRAVENOUS AS NEEDED
Status: DISCONTINUED | OUTPATIENT
Start: 2022-09-29 | End: 2022-09-29 | Stop reason: SURG

## 2022-09-29 RX ORDER — SODIUM CHLORIDE, SODIUM LACTATE, POTASSIUM CHLORIDE, CALCIUM CHLORIDE 600; 310; 30; 20 MG/100ML; MG/100ML; MG/100ML; MG/100ML
30 INJECTION, SOLUTION INTRAVENOUS CONTINUOUS PRN
Status: DISCONTINUED | OUTPATIENT
Start: 2022-09-29 | End: 2022-09-29 | Stop reason: HOSPADM

## 2022-09-29 RX ORDER — SODIUM CHLORIDE 0.9 % (FLUSH) 0.9 %
10 SYRINGE (ML) INJECTION EVERY 12 HOURS SCHEDULED
Status: DISCONTINUED | OUTPATIENT
Start: 2022-09-29 | End: 2022-09-29 | Stop reason: HOSPADM

## 2022-09-29 RX ORDER — PROPOFOL 10 MG/ML
VIAL (ML) INTRAVENOUS CONTINUOUS PRN
Status: DISCONTINUED | OUTPATIENT
Start: 2022-09-29 | End: 2022-09-29 | Stop reason: SURG

## 2022-09-29 RX ORDER — SODIUM CHLORIDE 0.9 % (FLUSH) 0.9 %
10 SYRINGE (ML) INJECTION AS NEEDED
Status: DISCONTINUED | OUTPATIENT
Start: 2022-09-29 | End: 2022-09-29 | Stop reason: HOSPADM

## 2022-09-29 RX ADMIN — LIDOCAINE HYDROCHLORIDE 100 MG: 20 INJECTION, SOLUTION INFILTRATION; PERINEURAL at 08:09

## 2022-09-29 RX ADMIN — SODIUM CHLORIDE, POTASSIUM CHLORIDE, SODIUM LACTATE AND CALCIUM CHLORIDE: 600; 310; 30; 20 INJECTION, SOLUTION INTRAVENOUS at 08:07

## 2022-09-29 RX ADMIN — PROPOFOL 150 MG: 10 INJECTION, EMULSION INTRAVENOUS at 08:10

## 2022-09-29 RX ADMIN — Medication 100 MCG/KG/MIN: at 08:11

## 2022-09-29 NOTE — ANESTHESIA PREPROCEDURE EVALUATION
Anesthesia Evaluation     Patient summary reviewed and Nursing notes reviewed   NPO Solid Status: > 8 hours  NPO Liquid Status: > 4 hours           Airway   Mallampati: II  Neck ROM: full  No difficulty expected  Dental - normal exam     Pulmonary     breath sounds clear to auscultation  (+) asthma,  Cardiovascular     Rhythm: regular        Neuro/Psych  (+) headaches, psychiatric history Anxiety and Depression,      ROS Comment: Pituitary adenoma  GI/Hepatic/Renal/Endo    (+)   renal disease, thyroid problem     Musculoskeletal     Abdominal    Substance History      OB/GYN          Other                        Anesthesia Plan    ASA 3     MAC     intravenous induction     Anesthetic plan, risks, benefits, and alternatives have been provided, discussed and informed consent has been obtained with: patient.        CODE STATUS:

## 2022-09-30 LAB
LAB AP CASE REPORT: NORMAL
PATH REPORT.FINAL DX SPEC: NORMAL
PATH REPORT.GROSS SPEC: NORMAL

## 2022-10-03 ENCOUNTER — TELEPHONE (OUTPATIENT)
Dept: SURGERY | Facility: CLINIC | Age: 47
End: 2022-10-03

## 2022-10-03 NOTE — TELEPHONE ENCOUNTER
I called Kaia and left a voicemail on her cell phone regarding the pathology results of the 2 polyps removed last week during her colonoscopy.  The first returned as a benign tubular adenoma and the second returned as a benign tubulovillous adenoma.  I would recommend given the presence of one tubulovillous adenoma, she needs to return in 3 years for a repeat screening colonoscopy.    Carrie, can you please update her health maintenance tab and recall pool to reflect a 3-year interval?    Thanks,  MUNDO

## 2022-10-07 ENCOUNTER — OFFICE VISIT (OUTPATIENT)
Dept: INTERNAL MEDICINE | Age: 47
End: 2022-10-07

## 2022-10-07 VITALS
OXYGEN SATURATION: 98 % | SYSTOLIC BLOOD PRESSURE: 94 MMHG | BODY MASS INDEX: 20.73 KG/M2 | HEART RATE: 54 BPM | DIASTOLIC BLOOD PRESSURE: 60 MMHG | HEIGHT: 63 IN | WEIGHT: 117 LBS | TEMPERATURE: 97.7 F

## 2022-10-07 DIAGNOSIS — Z98.1 STATUS POST LUMBAR SPINAL FUSION: ICD-10-CM

## 2022-10-07 DIAGNOSIS — M54.41 CHRONIC BILATERAL LOW BACK PAIN WITH BILATERAL SCIATICA: Primary | ICD-10-CM

## 2022-10-07 DIAGNOSIS — M54.42 CHRONIC BILATERAL LOW BACK PAIN WITH BILATERAL SCIATICA: Primary | ICD-10-CM

## 2022-10-07 DIAGNOSIS — G89.29 CHRONIC BILATERAL LOW BACK PAIN WITH BILATERAL SCIATICA: Primary | ICD-10-CM

## 2022-10-07 PROCEDURE — 99214 OFFICE O/P EST MOD 30 MIN: CPT | Performed by: INTERNAL MEDICINE

## 2022-10-07 RX ORDER — METHYLPREDNISOLONE 4 MG/1
TABLET ORAL
Qty: 1 EACH | Refills: 0 | Status: SHIPPED | OUTPATIENT
Start: 2022-10-07 | End: 2022-11-09

## 2022-10-07 RX ORDER — MELOXICAM 7.5 MG/1
7.5 TABLET ORAL DAILY PRN
Qty: 30 TABLET | Refills: 0 | Status: SHIPPED | OUTPATIENT
Start: 2022-10-07 | End: 2022-12-16

## 2022-10-07 NOTE — PROGRESS NOTES
I N T E R N A L  M E D I C I N E    J U N O H  K I M,  M D      ENCOUNTER DATE:  10/07/2022    Kaia Mack / 47 y.o. / female    CHIEF COMPLAINT / REASON FOR OFFICE VISIT     sciatic pain (Both legs )      ASSESSMENT & PLAN     Problem List Items Addressed This Visit        High    Chronic bilateral low back pain with bilateral sciatica - Primary (Chronic)    Overview     9/2016 :  L4-5 laminectomy and fusion with instrumentation          Current Assessment & Plan     Prior L4-5 laminectomy with fusion, 1 month history of persistent lower back pain with bilateral sciatic pain symptoms.    · Trial of Medrol Dosepak  · Meloxicam 7.5 mg daily as needed for pain/inflammation.  Discussed benefits versus risks, potential adverse effects of chest gastrointestinal bleeding, effect on blood pressure, kidney function and cardiovascular risk.  · She may also take Tylenol extra strength 1 to 2 tablets every 8 hours as needed  · She was instructed to modify her exercise/physical activity which is exacerbating her condition.  · She was advised to update me on her progress in 2 weeks.  If her symptoms do not improve will plan to check an x-ray of the lumbar spine and refer her to physical therapy.  · If that does not help we would consider getting an MRI of the lumbar spine.  · Patient expressed agreement with above delineated plans and agreed to follow the recommendations set forth.         Relevant Medications    methylPREDNISolone (Medrol) 4 MG dose pack    meloxicam (Mobic) 7.5 MG tablet      Other Visit Diagnoses     Status post lumbar spinal fusion            No orders of the defined types were placed in this encounter.    New Medications Ordered This Visit   Medications   • methylPREDNISolone (Medrol) 4 MG dose pack     Sig: Take as directed on package instructions.     Dispense:  1 each     Refill:  0   • meloxicam (Mobic) 7.5 MG tablet     Sig: Take 1 tablet by mouth Daily As Needed for Moderate Pain.      "Dispense:  30 tablet     Refill:  0       SUMMARY/DISCUSSION  •       Next Appointment with me: 1/23/2023    No follow-ups on file.      VITAL SIGNS     Vitals:    10/07/22 1123   BP: 94/60   BP Location: Left arm   Pulse: 54   Temp: 97.7 °F (36.5 °C)   SpO2: 98%   Weight: 53.1 kg (117 lb)   Height: 160 cm (63\")       BP Readings from Last 3 Encounters:   10/07/22 94/60   09/29/22 97/62   07/22/22 94/60     Wt Readings from Last 3 Encounters:   10/07/22 53.1 kg (117 lb)   09/29/22 51.9 kg (114 lb 8 oz)   07/22/22 53.1 kg (117 lb)     Body mass index is 20.73 kg/m².    Blood pressure readings recorded on patient flowsheet:  No flowsheet data found.       MEDICATIONS AT THE TIME OF OFFICE VISIT     Current Outpatient Medications on File Prior to Visit   Medication Sig   • Calcium Carb-Cholecalciferol (CALCIUM + D3 PO) Take 1 tablet by mouth daily.   • cetirizine (ZyrTEC) 10 MG tablet Take 10 mg by mouth every morning.   • Cholecalciferol (VITAMIN D) 1000 UNITS tablet Take 1,000 Units by mouth Daily.   • citalopram (CeleXA) 20 MG tablet Take 1 tablet by mouth Daily.   • COLLAGEN PO Take  by mouth Daily.   • ELDERBERRY PO Take  by mouth.   • fluticasone (FLONASE) 50 MCG/ACT nasal spray 1 spray into each nostril 2 (Two) Times a Day.   • levothyroxine (SYNTHROID, LEVOTHROID) 88 MCG tablet Take 88 mcg by mouth Daily.   • MAGNESIUM PO Take  by mouth Daily.   • vitamin B-12 (CYANOCOBALAMIN) 1000 MCG tablet Take 1,000 mcg by mouth Daily.   • Zinc Sulfate (ZINC 15 PO) Take  by mouth.     No current facility-administered medications on file prior to visit.          HISTORY OF PRESENT ILLNESS   The patient presents today for complaints of sciatica pain.    Ms. Mack is experiencing low back pain with bilateral sciatic pain. She describes the pain as a constant \"stabbing\" pain. She underwent a lumbar spinal fusion in 2016 and reports chronic pain that has become more frequent in the last 2 months. She is a  and " "does cardio and strength training exercises 5 days per week. She has been taking extra classes to help fill in for other trainers who are absent and feels she just \"wore herself out.\" For the pain she has been utilizing ibuprofen 1-2 tablets twice daily as needed. She notes this takes the \"edge\" off but does not completely resolve the pain. She has concerns about long-term use of ibuprofen and would like to discuss the benefit of replacing this with her previous medication Celebrex.       Answers for HPI/ROS submitted by the patient on 10/7/2022  What is the primary reason for your visit?: Back Pain  Chronicity: chronic  Onset: more than 1 month ago  Frequency: constantly  Progression since onset: gradually worsening  Pain location: gluteal, sacro-iliac  Pain quality: aching, shooting  Radiates to: left thigh, right thigh  Pain - numeric: 6/10  Pain is: the same all the time  Aggravated by: position, standing  Stiffness is present: in the morning  bladder incontinence: No  bowel incontinence: No  leg pain: Yes  paresis: No  paresthesias: No  perianal numbness: No  tingling: No  weight loss: No          Patient Care Team:  Mason Bonilla MD as PCP - General (Internal Medicine)  Nova Guevara MD as Consulting Physician (Endocrinology)  Yael Carson MD (Dermatology)  Ping Cisneros MD as Consulting Physician (Obstetrics and Gynecology)    REVIEW OF SYSTEMS     Review of Systems   No melena or blood in stool.   No bowel/bladder problems   No weakness of lower extremities     PHYSICAL EXAMINATION     Physical Exam  Alert with normal thought and judgment.   No acute distress   Lumbar: mild tightness and tenderness; flexion at the waist if normal       REVIEWED DATA     Labs:     Lab Results   Component Value Date     06/16/2021    K 4.5 06/16/2021    CALCIUM 9.2 06/16/2021    AST 13 06/16/2021    ALT 11 06/16/2021    BUN 16 06/16/2021    CREATININE 0.85 06/16/2021    CREATININE 0.68 07/22/2017    " CREATININE 0.66 07/21/2017    EGFRIFNONA 73 06/16/2021    EGFRIFAFRI 88 06/16/2021       Lab Results   Component Value Date    HGBA1C 4.80 06/16/2021       Lab Results   Component Value Date    LDL 64 06/16/2021    HDL 80 (H) 06/16/2021    TRIG 108 06/16/2021       Lab Results   Component Value Date    TSH 0.148 (L) 03/03/2022    TSH 0.032 (L) 09/07/2021    TSH 0.396 06/16/2021    FREET4 1.32 09/06/2022    FREET4 1.30 03/03/2022    FREET4 1.29 09/07/2021       Lab Results   Component Value Date    WBC 4.76 08/12/2021    HGB 13.8 08/12/2021     08/12/2021       No results found for: MALBCRERATIO       Imaging:           Medical Tests:           Summary of old records / correspondence / consultant report:     Orthopedist note 9/2019: s/p lumbar fusion       Request outside records:       Transcribed from ambient dictation for Mason Bonilla MD by Sara Gaitan.  10/07/22   14:14 EDT    Patient or patient representative verbalized consent to the visit recording.  I have personally performed the services described in this document as transcribed by the above individual, and it is both accurate and complete.  Mason Bonilla MD  10/7/2022  14:51 EDT        *Examiner was wearing KN95 mask and eye protection during the entire duration of the visit. Patient was masked the entire time. Minimum social distance of 6 ft maintained entire visit except if physical contact was necessary as documented.       Template created by Sukhi Bonilla MD

## 2022-10-07 NOTE — ASSESSMENT & PLAN NOTE
Prior L4-5 laminectomy with fusion, 1 month history of persistent lower back pain with bilateral sciatic pain symptoms.    · Trial of Medrol Dosepak  · Meloxicam 7.5 mg daily as needed for pain/inflammation.  Discussed benefits versus risks, potential adverse effects of chest gastrointestinal bleeding, effect on blood pressure, kidney function and cardiovascular risk.  · She may also take Tylenol extra strength 1 to 2 tablets every 8 hours as needed  · She was instructed to modify her exercise/physical activity which is exacerbating her condition.  · She was advised to update me on her progress in 2 weeks.  If her symptoms do not improve will plan to check an x-ray of the lumbar spine and refer her to physical therapy.  · If that does not help we would consider getting an MRI of the lumbar spine.  · Patient expressed agreement with above delineated plans and agreed to follow the recommendations set forth.

## 2022-11-07 ENCOUNTER — DOCUMENTATION (OUTPATIENT)
Dept: INTERNAL MEDICINE | Age: 47
End: 2022-11-07

## 2022-11-07 DIAGNOSIS — M54.41 CHRONIC BILATERAL LOW BACK PAIN WITH BILATERAL SCIATICA: Primary | ICD-10-CM

## 2022-11-07 DIAGNOSIS — G89.29 CHRONIC BILATERAL LOW BACK PAIN WITH BILATERAL SCIATICA: Primary | ICD-10-CM

## 2022-11-07 DIAGNOSIS — M54.42 CHRONIC BILATERAL LOW BACK PAIN WITH BILATERAL SCIATICA: Primary | ICD-10-CM

## 2022-11-09 ENCOUNTER — OFFICE VISIT (OUTPATIENT)
Dept: INTERNAL MEDICINE | Age: 47
End: 2022-11-09

## 2022-11-09 VITALS
DIASTOLIC BLOOD PRESSURE: 60 MMHG | WEIGHT: 120 LBS | HEART RATE: 65 BPM | OXYGEN SATURATION: 99 % | SYSTOLIC BLOOD PRESSURE: 100 MMHG | TEMPERATURE: 97.5 F | HEIGHT: 63 IN | BODY MASS INDEX: 21.26 KG/M2

## 2022-11-09 DIAGNOSIS — Z51.81 THERAPEUTIC DRUG MONITORING: ICD-10-CM

## 2022-11-09 DIAGNOSIS — M54.41 CHRONIC BILATERAL LOW BACK PAIN WITH BILATERAL SCIATICA: Primary | Chronic | ICD-10-CM

## 2022-11-09 DIAGNOSIS — G89.29 CHRONIC BILATERAL LOW BACK PAIN WITH BILATERAL SCIATICA: Primary | Chronic | ICD-10-CM

## 2022-11-09 DIAGNOSIS — M54.42 CHRONIC BILATERAL LOW BACK PAIN WITH BILATERAL SCIATICA: Primary | Chronic | ICD-10-CM

## 2022-11-09 PROCEDURE — 99214 OFFICE O/P EST MOD 30 MIN: CPT | Performed by: INTERNAL MEDICINE

## 2022-11-09 RX ORDER — GABAPENTIN 300 MG/1
300 CAPSULE ORAL 3 TIMES DAILY
Qty: 90 CAPSULE | Refills: 0 | Status: SHIPPED | OUTPATIENT
Start: 2022-11-09 | End: 2022-12-27 | Stop reason: SDUPTHER

## 2022-11-09 NOTE — ASSESSMENT & PLAN NOTE
No significant improvement after Medrol Dosepak.    She is scheduled for MRI sometime next week.  Will give a trial of gabapentin 300 mg titrated up to 3 times daily.  Consent and contract signed.  Kishore reviewed and UDS ordered.  She was advised to modify her exercise routine.  She was also recommended to try physical therapy.  She will likely need epidural spinal injections pending MRI.

## 2022-11-09 NOTE — PROGRESS NOTES
"    I N T E R N A L  M E D I C I N E    J U N O H  K I M,  M D      ENCOUNTER DATE:  11/09/2022    Kaia Mack / 47 y.o. / female    CHIEF COMPLAINT / REASON FOR OFFICE VISIT     Sciatic pain evaluation      ASSESSMENT & PLAN     Problem List Items Addressed This Visit        High    Chronic bilateral low back pain with bilateral sciatica - Primary (Chronic)    Overview     9/2016 :  L4-5 laminectomy and fusion with instrumentation          Current Assessment & Plan     No significant improvement after Medrol Dosepak.    She is scheduled for MRI sometime next week.  Will give a trial of gabapentin 300 mg titrated up to 3 times daily.  Consent and contract signed.  Kishore reviewed and UDS ordered.  She was advised to modify her exercise routine.  She was also recommended to try physical therapy.  She will likely need epidural spinal injections pending MRI.         Relevant Medications    meloxicam (Mobic) 7.5 MG tablet    gabapentin (NEURONTIN) 300 MG capsule    Other Relevant Orders    Compliance Drug Analysis, Ur - Urine, Clean Catch   Other Visit Diagnoses     Therapeutic drug monitoring        Relevant Orders    Compliance Drug Analysis, Ur - Urine, Clean Catch        Orders Placed This Encounter   Procedures   • Compliance Drug Analysis, Ur - Urine, Clean Catch     New Medications Ordered This Visit   Medications   • gabapentin (NEURONTIN) 300 MG capsule     Sig: Take 1 capsule by mouth 3 (Three) Times a Day. Start taking 1 capsule at bedtime and titrate up to 3 times daily.     Dispense:  90 capsule     Refill:  0       SUMMARY/DISCUSSION  •       Next Appointment with me: 1/23/2023    No follow-ups on file.      VITAL SIGNS     Vitals:    11/09/22 1308   BP: 100/60   Pulse: 65   Temp: 97.5 °F (36.4 °C)   SpO2: 99%   Weight: 54.4 kg (120 lb)   Height: 160 cm (63\")       BP Readings from Last 3 Encounters:   11/09/22 100/60   10/07/22 94/60   09/29/22 97/62     Wt Readings from Last 3 Encounters:   11/09/22 " 54.4 kg (120 lb)   10/07/22 53.1 kg (117 lb)   09/29/22 51.9 kg (114 lb 8 oz)     Body mass index is 21.26 kg/m².    Blood pressure readings recorded on patient flowsheet:  No flowsheet data found.       MEDICATIONS AT THE TIME OF OFFICE VISIT     Current Outpatient Medications on File Prior to Visit   Medication Sig   • Calcium Carb-Cholecalciferol (CALCIUM + D3 PO) Take 1 tablet by mouth daily.   • cetirizine (ZyrTEC) 10 MG tablet Take 10 mg by mouth every morning.   • Cholecalciferol (VITAMIN D) 1000 UNITS tablet Take 1,000 Units by mouth Daily.   • citalopram (CeleXA) 20 MG tablet Take 1 tablet by mouth Daily.   • COLLAGEN PO Take  by mouth Daily.   • fluticasone (FLONASE) 50 MCG/ACT nasal spray 1 spray into each nostril 2 (Two) Times a Day.   • levothyroxine (SYNTHROID, LEVOTHROID) 88 MCG tablet Take 88 mcg by mouth Daily.   • MAGNESIUM PO Take  by mouth Daily.   • meloxicam (Mobic) 7.5 MG tablet Take 1 tablet by mouth Daily As Needed for Moderate Pain.   • vitamin B-12 (CYANOCOBALAMIN) 1000 MCG tablet Take 1,000 mcg by mouth Daily.   • Zinc Sulfate (ZINC 15 PO) Take  by mouth.   • [DISCONTINUED] ELDERBERRY PO Take  by mouth.   • [DISCONTINUED] methylPREDNISolone (Medrol) 4 MG dose pack Take as directed on package instructions.     No current facility-administered medications on file prior to visit.          HISTORY OF PRESENT ILLNESS     No significant relief of her bilateral lower back pain with sciatica with Medrol Dosepak.  She has been scheduled for MRI of the lumbar spine.  She has history of prior spine surgery.  She is on escitalopram for depression.  She has made minor changes to her exercise routine.  She complains of pain that is affecting mostly her bilateral sacral region with numbness sensation radiating down her posterior thigh.  No bowel or bladder control issues.      Patient Care Team:  Mason Bonilla MD as PCP - General (Internal Medicine)  Nova Guevara MD as Consulting Physician  (Endocrinology)  Yael Carson MD (Dermatology)  Ping Cisneros MD as Consulting Physician (Obstetrics and Gynecology)    REVIEW OF SYSTEMS     Review of Systems       PHYSICAL EXAMINATION     Physical Exam  Constitutional:       General: She is not in acute distress.  Musculoskeletal:        Legs:    Neurological:      Mental Status: She is alert.           REVIEWED DATA     Labs:     Lab Results   Component Value Date     06/16/2021    K 4.5 06/16/2021    CALCIUM 9.2 06/16/2021    AST 13 06/16/2021    ALT 11 06/16/2021    BUN 16 06/16/2021    CREATININE 0.85 06/16/2021    CREATININE 0.68 07/22/2017    CREATININE 0.66 07/21/2017    EGFRIFNONA 73 06/16/2021    EGFRIFAFRI 88 06/16/2021       Lab Results   Component Value Date    HGBA1C 4.80 06/16/2021       Lab Results   Component Value Date    LDL 64 06/16/2021    HDL 80 (H) 06/16/2021    TRIG 108 06/16/2021       Lab Results   Component Value Date    TSH 0.148 (L) 03/03/2022    TSH 0.032 (L) 09/07/2021    TSH 0.396 06/16/2021    FREET4 1.32 09/06/2022    FREET4 1.30 03/03/2022    FREET4 1.29 09/07/2021       Lab Results   Component Value Date    WBC 4.76 08/12/2021    HGB 13.8 08/12/2021     08/12/2021       No results found for: MALBCRERATIO       Imaging:           Medical Tests:           Summary of old records / correspondence / consultant report:           Request outside records:             *Examiner was wearing KN95 mask and eye protection during the entire duration of the visit. Patient was masked the entire time. Minimum social distance of 6 ft maintained entire visit except if physical contact was necessary as documented.       Template created by Sukhi Bonilla MD       Answers for HPI/ROS submitted by the patient on 11/7/2022  Please describe your symptoms.: Sciatic pain and numbness in the leg and foot  Have you had these symptoms before?: Yes  How long have you been having these symptoms?: Greater than 2 weeks  Please list any  medications you are currently taking for this condition.: Meloxicam, Tylenol  Please describe any probable cause for these symptoms. : Stenosis in the lumbar spine  What is the primary reason for your visit?: Other

## 2022-11-16 ENCOUNTER — TELEPHONE (OUTPATIENT)
Dept: INTERNAL MEDICINE | Age: 47
End: 2022-11-16

## 2022-11-16 NOTE — TELEPHONE ENCOUNTER
Caller: Fidel Mack    Relationship: Emergency Contact    Best call back number: [phone]    What orders are you requesting (i.e. lab or imaging): MRI OF LUMBAR SPINE    In what timeframe would the patient need to come in: AS SOON AS POSSIBLE    Where will you receive your lab/imaging services: [facility] [phone]

## 2022-11-18 ENCOUNTER — APPOINTMENT (OUTPATIENT)
Dept: MRI IMAGING | Facility: HOSPITAL | Age: 47
End: 2022-11-18

## 2022-11-29 NOTE — PROGRESS NOTES
CALL PATIENT with results:    MRI of the lumbar spine shows severe spinal stenosis (narrowing of the central spinal canal where the spinal cord runs) at L4-5 and severe neuroforaminal stenosis bilaterally (where the spinal nerves exit the spinal canal). She will need to see a neurosurgeon due to the severity of findings. Place referral for neurosurgery if she is agreeable (urgent / severe lumbar spinal stenosis)(see if she wants to see her prior surgeon).  In the meantime if she is still having severe pain, we can refer her to pain management for epidural spinal injection until she can be evaluated by neurosurgery. Make sure she has followup with me within 1-2 weeks.

## 2022-11-30 DIAGNOSIS — M48.061 SPINAL STENOSIS AT L4-L5 LEVEL: Primary | ICD-10-CM

## 2022-11-30 DIAGNOSIS — M48.00 NEUROFORAMINAL STENOSIS OF SPINE: ICD-10-CM

## 2022-12-16 ENCOUNTER — OFFICE VISIT (OUTPATIENT)
Dept: INTERNAL MEDICINE | Age: 47
End: 2022-12-16

## 2022-12-16 VITALS
BODY MASS INDEX: 20.91 KG/M2 | TEMPERATURE: 97.7 F | SYSTOLIC BLOOD PRESSURE: 100 MMHG | OXYGEN SATURATION: 99 % | WEIGHT: 118 LBS | HEART RATE: 49 BPM | HEIGHT: 63 IN | DIASTOLIC BLOOD PRESSURE: 60 MMHG

## 2022-12-16 DIAGNOSIS — G89.29 CHRONIC BILATERAL LOW BACK PAIN WITH BILATERAL SCIATICA: Primary | Chronic | ICD-10-CM

## 2022-12-16 DIAGNOSIS — M54.41 CHRONIC BILATERAL LOW BACK PAIN WITH BILATERAL SCIATICA: Primary | Chronic | ICD-10-CM

## 2022-12-16 DIAGNOSIS — M54.42 CHRONIC BILATERAL LOW BACK PAIN WITH BILATERAL SCIATICA: Primary | Chronic | ICD-10-CM

## 2022-12-16 DIAGNOSIS — M48.061 NEUROFORAMINAL STENOSIS OF LUMBAR SPINE: ICD-10-CM

## 2022-12-16 DIAGNOSIS — M48.061 DEGENERATIVE LUMBAR SPINAL STENOSIS: ICD-10-CM

## 2022-12-16 PROCEDURE — 99213 OFFICE O/P EST LOW 20 MIN: CPT | Performed by: INTERNAL MEDICINE

## 2022-12-16 NOTE — PROGRESS NOTES
"    I N T E R N A L  M E D I C I N E    J U N O H  K I M,  M D      ENCOUNTER DATE:  12/16/2022    Kaia GUTHRIE Frederick / 47 y.o. / female    CHIEF COMPLAINT / REASON FOR OFFICE VISIT     Chronic bilateral low back pain with bilateral sciatica      ASSESSMENT & PLAN     Problem List Items Addressed This Visit        High    Chronic bilateral low back pain with bilateral sciatica - Primary (Chronic)    Overview     9/2016 :  L4-5 laminectomy and fusion with instrumentation          Current Assessment & Plan     Severe lumbar spinal stenosis and bilateral NF stenosis  Pain is better on gabapentin. Taking 300 mg BID. May take it TID if needed.   Has consultation with neurosurgery in January.   She has made modifications to her physical activity/exercise which is also helping.   Keep follow-up for 1/23/23.          Relevant Medications    gabapentin (NEURONTIN) 300 MG capsule    Neuroforaminal stenosis of lumbar spine (Chronic)     No orders of the defined types were placed in this encounter.    No orders of the defined types were placed in this encounter.      SUMMARY/DISCUSSION  •       Next Appointment with me: 1/23/2023    No follow-ups on file.      VITAL SIGNS     Vitals:    12/16/22 1038   BP: 100/60   Pulse: (!) 49   Temp: 97.7 °F (36.5 °C)   SpO2: 99%   Weight: 53.5 kg (118 lb)   Height: 160 cm (63\")       BP Readings from Last 3 Encounters:   12/16/22 100/60   11/09/22 100/60   10/07/22 94/60     Wt Readings from Last 3 Encounters:   12/16/22 53.5 kg (118 lb)   11/09/22 54.4 kg (120 lb)   10/07/22 53.1 kg (117 lb)     Body mass index is 20.9 kg/m².    Blood pressure readings recorded on patient flowsheet:  No flowsheet data found.       MEDICATIONS AT THE TIME OF OFFICE VISIT     Current Outpatient Medications on File Prior to Visit   Medication Sig   • Calcium Carb-Cholecalciferol (CALCIUM + D3 PO) Take 1 tablet by mouth daily.   • cetirizine (ZyrTEC) 10 MG tablet Take 10 mg by mouth every morning.   • " Cholecalciferol (VITAMIN D) 1000 UNITS tablet Take 1,000 Units by mouth Daily.   • citalopram (CeleXA) 20 MG tablet Take 1 tablet by mouth Daily.   • COLLAGEN PO Take  by mouth Daily.   • fluticasone (FLONASE) 50 MCG/ACT nasal spray 1 spray into each nostril 2 (Two) Times a Day.   • gabapentin (NEURONTIN) 300 MG capsule Take 1 capsule by mouth 3 (Three) Times a Day. Start taking 1 capsule at bedtime and titrate up to 3 times daily.   • levothyroxine (SYNTHROID, LEVOTHROID) 88 MCG tablet Take 88 mcg by mouth Daily.   • MAGNESIUM PO Take  by mouth Daily.   • vitamin B-12 (CYANOCOBALAMIN) 1000 MCG tablet Take 1,000 mcg by mouth Daily.   • Zinc Sulfate (ZINC 15 PO) Take  by mouth.       HISTORY OF PRESENT ILLNESS     Low back pain associated with sciatica bilaterally is improved on gabapentin 300 mg.  She is taking it twice daily at this time.  Denies any significant side effects.  She has made modifications to her exercise and physical activity which has also helped.  She has a consultation with Dr. Olvera in January.       Patient Care Team:  Mason Bonilla MD as PCP - General (Internal Medicine)  Nova Guevara MD as Consulting Physician (Endocrinology)  Yael Carson MD (Dermatology)  Ping Cisneros MD as Consulting Physician (Obstetrics and Gynecology)    REVIEW OF SYSTEMS     Review of Systems   GI negative   negative     PHYSICAL EXAMINATION     Physical Exam  Alert with normal thought and judgment.   No acute distress       REVIEWED DATA     Labs:     Lab Results   Component Value Date     06/16/2021    K 4.5 06/16/2021    CALCIUM 9.2 06/16/2021    AST 13 06/16/2021    ALT 11 06/16/2021    BUN 16 06/16/2021    CREATININE 0.85 06/16/2021    CREATININE 0.68 07/22/2017    CREATININE 0.66 07/21/2017    EGFRIFNONA 73 06/16/2021    EGFRIFAFRI 88 06/16/2021       Lab Results   Component Value Date    HGBA1C 4.80 06/16/2021       Lab Results   Component Value Date    LDL 64 06/16/2021    HDL 80 (H)  06/16/2021    TRIG 108 06/16/2021       Lab Results   Component Value Date    TSH 0.148 (L) 03/03/2022    TSH 0.032 (L) 09/07/2021    TSH 0.396 06/16/2021    FREET4 1.32 09/06/2022    FREET4 1.30 03/03/2022    FREET4 1.29 09/07/2021       Lab Results   Component Value Date    WBC 4.76 08/12/2021    HGB 13.8 08/12/2021     08/12/2021       No results found for: MALBCRERATIO       Imaging:     Reviewed MRI of the lumbar spine in detail with the patient      Medical Tests:           Summary of old records / correspondence / consultant report:           Request outside records:             *Examiner was wearing KN95 mask and eye protection during the entire duration of the visit. Patient was masked the entire time. Minimum social distance of 6 ft maintained entire visit except if physical contact was necessary as documented.       Template created by Sukhi Bonilla MD

## 2022-12-16 NOTE — ASSESSMENT & PLAN NOTE
Severe lumbar spinal stenosis and bilateral NF stenosis  Pain is better on gabapentin. Taking 300 mg BID. May take it TID if needed.   Has consultation with neurosurgery in January.   She has made modifications to her physical activity/exercise which is also helping.   Keep follow-up for 1/23/23.

## 2022-12-27 DIAGNOSIS — G89.29 CHRONIC BILATERAL LOW BACK PAIN WITH BILATERAL SCIATICA: Chronic | ICD-10-CM

## 2022-12-27 DIAGNOSIS — M54.42 CHRONIC BILATERAL LOW BACK PAIN WITH BILATERAL SCIATICA: Chronic | ICD-10-CM

## 2022-12-27 DIAGNOSIS — M54.41 CHRONIC BILATERAL LOW BACK PAIN WITH BILATERAL SCIATICA: Chronic | ICD-10-CM

## 2022-12-28 RX ORDER — GABAPENTIN 300 MG/1
300 CAPSULE ORAL 3 TIMES DAILY
Qty: 90 CAPSULE | Refills: 0 | Status: SHIPPED | OUTPATIENT
Start: 2022-12-28

## 2023-01-02 LAB — DRUGS UR: NORMAL

## 2023-01-10 ENCOUNTER — OFFICE VISIT (OUTPATIENT)
Dept: NEUROSURGERY | Facility: CLINIC | Age: 48
End: 2023-01-10
Payer: COMMERCIAL

## 2023-01-10 VITALS
HEART RATE: 71 BPM | BODY MASS INDEX: 20.91 KG/M2 | DIASTOLIC BLOOD PRESSURE: 75 MMHG | SYSTOLIC BLOOD PRESSURE: 110 MMHG | HEIGHT: 63 IN | OXYGEN SATURATION: 98 % | WEIGHT: 118 LBS | TEMPERATURE: 97.5 F

## 2023-01-10 DIAGNOSIS — M48.062 SPINAL STENOSIS OF LUMBAR REGION WITH NEUROGENIC CLAUDICATION: Primary | ICD-10-CM

## 2023-01-10 PROCEDURE — 99204 OFFICE O/P NEW MOD 45 MIN: CPT | Performed by: NEUROLOGICAL SURGERY

## 2023-01-10 RX ORDER — DEXAMETHASONE 4 MG/1
8 TABLET ORAL TAKE AS DIRECTED
Qty: 2 TABLET | Refills: 0 | Status: SHIPPED | OUTPATIENT
Start: 2023-01-10 | End: 2023-01-26 | Stop reason: HOSPADM

## 2023-01-10 NOTE — PROGRESS NOTES
Subjective   Patient ID: Kaia Mack is a 47 y.o. female is being seen for consultation today at the request of Mason Bonilla MD for spinal stenosis at L4-L5 level, Neuroforaminal stenosis of spine. Patient had MRI lumbar on 11/23/22 at [facility]    Treatment: No recent treatment    Today patient states that she has low back pain that radiates to the L buttock, hip. Patient denies B/B incontinence    Patient, Provider, and MA are all wearing a mask in our office today    History of Present Illness     This patient returns the office today.  She had a previous fusion by me in Dr Dietz in September 2016 at L4-5.  She says she is always had some back pain ever since her surgery but over the last 6 months it has become more severe.  It is located on the left side of the lower back.  It radiates into the left hip and down the posterior lateral thigh and posterior lateral calf and sometimes even into the foot.  The right leg is mostly okay.  She has been treated with some anti-inflammatory medications and exercise which help but then it comes back.    The following portions of the patient's history were reviewed and updated as appropriate: allergies, current medications, past family history, past medical history, past social history, past surgical history and problem list.    Review of Systems   Constitutional: Negative for chills and fever.   HENT: Negative for congestion.    Genitourinary: Negative for difficulty urinating and dysuria.   Musculoskeletal: Positive for back pain and myalgias. Negative for gait problem.        L leg pain   Neurological: Positive for numbness. Negative for weakness.       I have reviewed the review of systems as documented by my MA.      Objective     Vitals:    01/10/23 1345   BP: 110/75   Cuff Size: Adult   Pulse: 71   Temp: 97.5 °F (36.4 °C)   SpO2: 98%   Weight: 53.5 kg (118 lb)   Height: 160 cm (63\")     Body mass index is 20.9 kg/m².    Tobacco Use: Low Risk    • Smoking  Tobacco Use: Never   • Smokeless Tobacco Use: Never   • Passive Exposure: Not on file          Physical Exam  Constitutional:       Appearance: She is well-developed.   HENT:      Head: Normocephalic and atraumatic.   Eyes:      Extraocular Movements: EOM normal.      Conjunctiva/sclera: Conjunctivae normal.      Pupils: Pupils are equal, round, and reactive to light.   Neck:      Vascular: No carotid bruit.   Neurological:      Mental Status: She is oriented to person, place, and time.      Coordination: Finger-Nose-Finger Test and Heel to Shin Test normal.      Gait: Gait is intact.      Deep Tendon Reflexes:      Reflex Scores:       Tricep reflexes are 2+ on the right side and 2+ on the left side.       Bicep reflexes are 2+ on the right side and 2+ on the left side.       Brachioradialis reflexes are 2+ on the right side and 2+ on the left side.       Patellar reflexes are 2+ on the right side and 2+ on the left side.       Achilles reflexes are 2+ on the right side and 2+ on the left side.  Psychiatric:         Speech: Speech normal.       Neurologic Exam     Mental Status   Oriented to person, place, and time.   Registration of memory: Good recent and remote memory.   Attention: normal. Concentration: normal.   Speech: speech is normal   Level of consciousness: alert  Knowledge: consistent with education.     Cranial Nerves     CN II   Visual fields full to confrontation.   Visual acuity: normal    CN III, IV, VI   Pupils are equal, round, and reactive to light.  Extraocular motions are normal.     CN V   Facial sensation intact.   Right corneal reflex: normal  Left corneal reflex: normal    CN VII   Facial expression full, symmetric.   Right facial weakness: none  Left facial weakness: none    CN VIII   Hearing: intact    CN IX, X   Palate: symmetric    CN XI   Right sternocleidomastoid strength: normal  Left sternocleidomastoid strength: normal    CN XII   Tongue: not atrophic  Tongue deviation:  none    Motor Exam   Muscle bulk: normal  Right arm tone: normal  Left arm tone: normal  Right leg tone: normal  Left leg tone: normal    Strength   Strength 5/5 except as noted.     Sensory Exam   Light touch normal.     Gait, Coordination, and Reflexes     Gait  Gait: normal    Coordination   Finger to nose coordination: normal  Heel to shin coordination: normal    Reflexes   Right brachioradialis: 2+  Left brachioradialis: 2+  Right biceps: 2+  Left biceps: 2+  Right triceps: 2+  Left triceps: 2+  Right patellar: 2+  Left patellar: 2+  Right achilles: 2+  Left achilles: 2+  Right : 2+  Left : 2+          Assessment & Plan   Independent Review of Radiographic Studies:      I personally reviewed the images from the following studies.    I reviewed an MRI of the lumbar spine done on 23 November of this past year.  This shows a widely patent canal and neuroforamina at L1-2 and L2-3.  At L3-4 there is severe stenosis with a grade 2 spondylolisthesis.  She has had a previous fusion at L4-5.  L5-S1 mostly looks okay.    Medical Decision Making:      I told the patient I thought we should proceed with a lumbar myelogram.  I told the patient what a myelogram involves.  I explained that there is a 50% chance of developing a bad headache and nausea as a result of the test.  I explained that there is also a very small chance of infection, seizures, and bleeding.  I explained how we would treat a post myelogram headache including bedrest, caffeinated fluids, steroids, and blood patch.  The patient does ask to proceed.    Diagnoses and all orders for this visit:    1. Spinal stenosis of lumbar region with neurogenic claudication (Primary)  -     Obtain Informed Consent; Standing  -     IR Myelogram Lumbar Spine; Future  -     CT Lumbar Spine With Intrathecal Contrast; Future  -     XR Spine Lumbar Complete With Flex & Ext; Future  -     No Lab Testing Needed; Standing  -     dexamethasone (DECADRON) 4 MG tablet; Take 2  tablets by mouth Take As Directed. Take both tablets by mouth 2 hours before myelogram  Dispense: 2 tablet; Refill: 0      Return for After radiology test.

## 2023-01-10 NOTE — H&P (VIEW-ONLY)
"Subjective   Patient ID: Kaia Mack is a 47 y.o. female is being seen for consultation today at the request of Mason Bonilla MD for spinal stenosis at L4-L5 level, Neuroforaminal stenosis of spine. Patient had MRI lumbar on 11/23/22 at [facility]    Treatment: No recent treatment    Today patient states that she has low back pain that radiates to the L buttock, hip. Patient denies B/B incontinence    Patient, Provider, and MA are all wearing a mask in our office today    History of Present Illness     This patient returns the office today.  She had a previous fusion by me in Dr Dietz in September 2016 at L4-5.  She says she is always had some back pain ever since her surgery but over the last 6 months it has become more severe.  It is located on the left side of the lower back.  It radiates into the left hip and down the posterior lateral thigh and posterior lateral calf and sometimes even into the foot.  The right leg is mostly okay.  She has been treated with some anti-inflammatory medications and exercise which help but then it comes back.    The following portions of the patient's history were reviewed and updated as appropriate: allergies, current medications, past family history, past medical history, past social history, past surgical history and problem list.    Review of Systems   Constitutional: Negative for chills and fever.   HENT: Negative for congestion.    Genitourinary: Negative for difficulty urinating and dysuria.   Musculoskeletal: Positive for back pain and myalgias. Negative for gait problem.        L leg pain   Neurological: Positive for numbness. Negative for weakness.       I have reviewed the review of systems as documented by my MA.      Objective     Vitals:    01/10/23 1345   BP: 110/75   Cuff Size: Adult   Pulse: 71   Temp: 97.5 °F (36.4 °C)   SpO2: 98%   Weight: 53.5 kg (118 lb)   Height: 160 cm (63\")     Body mass index is 20.9 kg/m².    Tobacco Use: Low Risk    • Smoking " Tobacco Use: Never   • Smokeless Tobacco Use: Never   • Passive Exposure: Not on file          Physical Exam  Constitutional:       Appearance: She is well-developed.   HENT:      Head: Normocephalic and atraumatic.   Eyes:      Extraocular Movements: EOM normal.      Conjunctiva/sclera: Conjunctivae normal.      Pupils: Pupils are equal, round, and reactive to light.   Neck:      Vascular: No carotid bruit.   Neurological:      Mental Status: She is oriented to person, place, and time.      Coordination: Finger-Nose-Finger Test and Heel to Shin Test normal.      Gait: Gait is intact.      Deep Tendon Reflexes:      Reflex Scores:       Tricep reflexes are 2+ on the right side and 2+ on the left side.       Bicep reflexes are 2+ on the right side and 2+ on the left side.       Brachioradialis reflexes are 2+ on the right side and 2+ on the left side.       Patellar reflexes are 2+ on the right side and 2+ on the left side.       Achilles reflexes are 2+ on the right side and 2+ on the left side.  Psychiatric:         Speech: Speech normal.       Neurologic Exam     Mental Status   Oriented to person, place, and time.   Registration of memory: Good recent and remote memory.   Attention: normal. Concentration: normal.   Speech: speech is normal   Level of consciousness: alert  Knowledge: consistent with education.     Cranial Nerves     CN II   Visual fields full to confrontation.   Visual acuity: normal    CN III, IV, VI   Pupils are equal, round, and reactive to light.  Extraocular motions are normal.     CN V   Facial sensation intact.   Right corneal reflex: normal  Left corneal reflex: normal    CN VII   Facial expression full, symmetric.   Right facial weakness: none  Left facial weakness: none    CN VIII   Hearing: intact    CN IX, X   Palate: symmetric    CN XI   Right sternocleidomastoid strength: normal  Left sternocleidomastoid strength: normal    CN XII   Tongue: not atrophic  Tongue deviation:  none    Motor Exam   Muscle bulk: normal  Right arm tone: normal  Left arm tone: normal  Right leg tone: normal  Left leg tone: normal    Strength   Strength 5/5 except as noted.     Sensory Exam   Light touch normal.     Gait, Coordination, and Reflexes     Gait  Gait: normal    Coordination   Finger to nose coordination: normal  Heel to shin coordination: normal    Reflexes   Right brachioradialis: 2+  Left brachioradialis: 2+  Right biceps: 2+  Left biceps: 2+  Right triceps: 2+  Left triceps: 2+  Right patellar: 2+  Left patellar: 2+  Right achilles: 2+  Left achilles: 2+  Right : 2+  Left : 2+          Assessment & Plan   Independent Review of Radiographic Studies:      I personally reviewed the images from the following studies.    I reviewed an MRI of the lumbar spine done on 23 November of this past year.  This shows a widely patent canal and neuroforamina at L1-2 and L2-3.  At L3-4 there is severe stenosis with a grade 2 spondylolisthesis.  She has had a previous fusion at L4-5.  L5-S1 mostly looks okay.    Medical Decision Making:      I told the patient I thought we should proceed with a lumbar myelogram.  I told the patient what a myelogram involves.  I explained that there is a 50% chance of developing a bad headache and nausea as a result of the test.  I explained that there is also a very small chance of infection, seizures, and bleeding.  I explained how we would treat a post myelogram headache including bedrest, caffeinated fluids, steroids, and blood patch.  The patient does ask to proceed.    Diagnoses and all orders for this visit:    1. Spinal stenosis of lumbar region with neurogenic claudication (Primary)  -     Obtain Informed Consent; Standing  -     IR Myelogram Lumbar Spine; Future  -     CT Lumbar Spine With Intrathecal Contrast; Future  -     XR Spine Lumbar Complete With Flex & Ext; Future  -     No Lab Testing Needed; Standing  -     dexamethasone (DECADRON) 4 MG tablet; Take 2  tablets by mouth Take As Directed. Take both tablets by mouth 2 hours before myelogram  Dispense: 2 tablet; Refill: 0      Return for After radiology test.

## 2023-01-13 ENCOUNTER — PATIENT ROUNDING (BHMG ONLY) (OUTPATIENT)
Dept: NEUROSURGERY | Facility: CLINIC | Age: 48
End: 2023-01-13
Payer: COMMERCIAL

## 2023-01-14 NOTE — PROGRESS NOTES
01/26/23 0002   Pre-Procedure Phone Call   Procedure Time Verified Yes   Arrival Time 0615   Procedure Location Verified Yes   Medical History Reviewed No   NPO Status Reinforced Yes   Ride and Caregiver Arranged Yes   Patient Knows to Bring Current Medications No   Bring Outside Films Requested No

## 2023-01-23 ENCOUNTER — TELEMEDICINE (OUTPATIENT)
Dept: INTERNAL MEDICINE | Age: 48
End: 2023-01-23
Payer: COMMERCIAL

## 2023-01-23 DIAGNOSIS — M54.41 CHRONIC BILATERAL LOW BACK PAIN WITH BILATERAL SCIATICA: Chronic | ICD-10-CM

## 2023-01-23 DIAGNOSIS — M54.42 CHRONIC BILATERAL LOW BACK PAIN WITH BILATERAL SCIATICA: Chronic | ICD-10-CM

## 2023-01-23 DIAGNOSIS — M48.062 SPINAL STENOSIS OF LUMBAR REGION WITH NEUROGENIC CLAUDICATION: Primary | ICD-10-CM

## 2023-01-23 DIAGNOSIS — G89.29 CHRONIC BILATERAL LOW BACK PAIN WITH BILATERAL SCIATICA: Chronic | ICD-10-CM

## 2023-01-23 PROCEDURE — 99213 OFFICE O/P EST LOW 20 MIN: CPT | Performed by: INTERNAL MEDICINE

## 2023-01-23 NOTE — ASSESSMENT & PLAN NOTE
She has been seen by neurosurgeon.  She is scheduled for a CT myelogram.  She stopped taking gabapentin on her own due to nonspecific side effects.  I advised her that she may resume the medication at a later point when she feels the pain is becoming worse.  Continue to modify physical activity.

## 2023-01-23 NOTE — PROGRESS NOTES
I N T E R N A L  M E D I C I N E    J U N O H  K I M,  M D      ENCOUNTER DATE:  01/23/2023    Kaia Mack / 47 y.o. / female      You have chosen to receive care through a telehealth visit.  Do you consent to use a video/audio connection for your medical care today? Yes    Location of patient is in [place], I know I am in [place]   Patient presents during COVID-19 pandemic. This service was conducted via [employer].     CHIEF COMPLAINT / REASON FOR OFFICE VISIT     TELEHEALTH ENCOUNTER:  Lumbar spinal stenosis      ASSESSMENT & PLAN     Problem List Items Addressed This Visit        High    Chronic bilateral low back pain with bilateral sciatica (Chronic)    Overview     9/2016 :  L4-5 laminectomy and fusion with instrumentation          Current Assessment & Plan     She has been seen by neurosurgeon.  She is scheduled for a CT myelogram.  She stopped taking gabapentin on her own due to nonspecific side effects.  I advised her that she may resume the medication at a later point when she feels the pain is becoming worse.  Continue to modify physical activity.         Relevant Medications    gabapentin (NEURONTIN) 300 MG capsule    Spinal stenosis of lumbar region with neurogenic claudication - Primary (Chronic)    Overview     Same as per chronic bilateral low back pain with bilateral sciatica         Relevant Medications    dexamethasone (DECADRON) 4 MG tablet        No orders of the defined types were placed in this encounter.    No orders of the defined types were placed in this encounter.      SUMMARY/DISCUSSION  •       Time spent: 10 minutes    Next Appointment with me: Visit date not found    No follow-ups on file.        HISTORY OF PRESENT ILLNESS     She has been seen by neurosurgery for severe lumbar spinal stenosis with chronic back pain and sciatica.  She is scheduled to have a CT myelogram.  She has made significant modifications to her exercise regimen.  She stopped taking gabapentin on due  to nonspecific side effects.        REVIEWED DATA     Labs:           Imaging:           Medical Tests:           Summary of old records / correspondence / consultant report:           Request outside records:         Template created by Sukhi Bonilla MD

## 2023-01-26 ENCOUNTER — HOSPITAL ENCOUNTER (OUTPATIENT)
Dept: GENERAL RADIOLOGY | Facility: HOSPITAL | Age: 48
Discharge: HOME | End: 2023-01-26
Payer: COMMERCIAL

## 2023-01-26 ENCOUNTER — HOSPITAL ENCOUNTER (OUTPATIENT)
Dept: CT IMAGING | Facility: HOSPITAL | Age: 48
Discharge: HOME | End: 2023-01-26
Payer: COMMERCIAL

## 2023-01-26 VITALS
WEIGHT: 116 LBS | TEMPERATURE: 97.8 F | BODY MASS INDEX: 19.81 KG/M2 | OXYGEN SATURATION: 99 % | HEIGHT: 64 IN | DIASTOLIC BLOOD PRESSURE: 56 MMHG | HEART RATE: 53 BPM | RESPIRATION RATE: 16 BRPM | SYSTOLIC BLOOD PRESSURE: 93 MMHG

## 2023-01-26 DIAGNOSIS — M48.062 SPINAL STENOSIS OF LUMBAR REGION WITH NEUROGENIC CLAUDICATION: ICD-10-CM

## 2023-01-26 PROCEDURE — 0 IOPAMIDOL 41 % SOLUTION: Performed by: NEUROLOGICAL SURGERY

## 2023-01-26 PROCEDURE — 0 LIDOCAINE 1 % SOLUTION: Performed by: NEUROLOGICAL SURGERY

## 2023-01-26 PROCEDURE — 62284 INJECTION FOR MYELOGRAM: CPT | Performed by: NEUROLOGICAL SURGERY

## 2023-01-26 PROCEDURE — 72132 CT LUMBAR SPINE W/DYE: CPT

## 2023-01-26 PROCEDURE — 72240 MYELOGRAPHY NECK SPINE: CPT

## 2023-01-26 PROCEDURE — 62304 MYELOGRAPHY LUMBAR INJECTION: CPT

## 2023-01-26 PROCEDURE — 72114 X-RAY EXAM L-S SPINE BENDING: CPT

## 2023-01-26 RX ORDER — ACETAMINOPHEN 325 MG/1
650 TABLET ORAL EVERY 4 HOURS PRN
Status: DISCONTINUED | OUTPATIENT
Start: 2023-01-26 | End: 2023-01-28 | Stop reason: HOSPADM

## 2023-01-26 RX ORDER — LIDOCAINE HYDROCHLORIDE 10 MG/ML
10 INJECTION, SOLUTION INFILTRATION; PERINEURAL ONCE
Status: COMPLETED | OUTPATIENT
Start: 2023-01-26 | End: 2023-01-26

## 2023-01-26 RX ORDER — HYDROCODONE BITARTRATE AND ACETAMINOPHEN 5; 325 MG/1; MG/1
1 TABLET ORAL EVERY 4 HOURS PRN
Status: DISCONTINUED | OUTPATIENT
Start: 2023-01-26 | End: 2023-01-28 | Stop reason: HOSPADM

## 2023-01-26 RX ADMIN — IOPAMIDOL 20 ML: 408 INJECTION, SOLUTION INTRATHECAL at 07:25

## 2023-01-26 RX ADMIN — LIDOCAINE HYDROCHLORIDE 4 ML: 10 INJECTION, SOLUTION INFILTRATION; PERINEURAL at 07:22

## 2023-01-26 NOTE — DISCHARGE INSTRUCTIONS
EDUCATION /DISCHARGE INSTRUCTIONS:    A myelogram is a special radiology procedure of the spinal cord, spinal nerves and other related structures.  You will be awake during the examination.  An area of your lower back will be cleansed with an antiseptic solution.  The physician will inject a numbing medication in your lower back.  While your back is numb, a needle will be placed in the lower back area.  A small amount of spinal fluid may be withdrawn and sent to the lab if ordered by your physician. While the needle is in the back, an injection of a contrast material (xray dye) will be given through the needle.  The contrast material will allow the physician to see the spinal cord and spinal nerves.  Once injected, the needle will be removed and a band aid will be placed over the injection site.  The table will be tilted during the process to allow the contrast material to flow to particular areas in the spine.  Following the injection and xrays, you will be taken to the CT scanner where more pictures will be taken. After the procedure is finished, the contrast material will be absorbed by your body and eliminated through your kidneys.  The radiologist will study and interpret your myelogram and send the results to your physician.  Procedure risks of a myelogram include, but are not limited to:  *  Bleeding   *  Seizure  *  Infection   *  Headache, possibly severe requiring a blood patch  *  Contrast reaction  *  Nerve or cord injury  *  Paralysis and death    Benefits of the procedure:  *  Best examination for delineating pathology related to spinal cord compression from a disc and/or nerve root compression  Alternatives to the procedure:  MRI - a non invasive procedure requiring intravenous contrast injection.  Cannot be done on patients with certain pacemakers or metal in the body.  MRI risks include possible reaction to the contrast material, movement of metal located in the body.Benefit to MRI:  Non-invasive  and usually painless procedure.  THIS EDUCATION INFORMATION WAS REVIEWED PRIOR TO PROCEDURE AND CONSENT. Patient initials________________Time___0640_______________    24 hour rest period ends _Friday, January 27, 2023 at 10:00 AM___________________.    Important information following your myelogram:  * ACTIVITY:   *  You may sit up in the car to go home.  *  When you get home, remain on bed rest (flat on your back or on your side) for 24 hours. You may place a rolled up towel under your neck for support  * You may get up to the bathroom and sit up to eat and drink then lie back down  * Drink additional fluids for 24 hours after the myelogram.   * Continue to drink additional fluids for the next 2-3 days. Water and caffeinated beverages are encouraged.  * Remain less active for the next two to three days.  * Do not drive for 24 hours following a myelogram.  * You may remove the bandage and shower in the morning.    CALL YOUR PHYSICIAN FOR THE FOLLOWING:  * Pain at the injection site  * Redness, swelling, bruising or drainage at the injection site.  * A fever by mouth of 101.0 or any new symptoms  Headaches are a common side effect after a myelogram.  If you get a headache, you should stay flat in bed and drink plenty of fluids. If the headache persists and does not go away with rest/medication, CALL Dr. Olvera at [phone].

## 2023-01-26 NOTE — NURSING NOTE
Pt in xray triage for pelon myelogram.  Pt wearing mask and RN wearing mask and eye protection for all pt interactions.

## 2023-01-27 ENCOUNTER — TELEPHONE (OUTPATIENT)
Dept: INTERVENTIONAL RADIOLOGY/VASCULAR | Facility: HOSPITAL | Age: 48
End: 2023-01-27
Payer: COMMERCIAL

## 2023-02-01 NOTE — PROGRESS NOTES
"Subjective   Patient ID: Kaia Mack is a 47 y.o. female is here today for follow-up with a new Lumbar Myelogram done on 01.26.2023 for back and leg pain.    Today patient's symptoms are unchanged, patient states that she has mild low back pain that radiates to the L buttock/hip.     Patient, Provider, and MA are all wearing a mask in our office today    History of Present Illness     This patient continues with pain in her back.  It is still severe.  It is in the left side down into her hip and her left leg.    The following portions of the patient's history were reviewed and updated as appropriate: allergies, current medications, past family history, past medical history, past social history, past surgical history and problem list.    Review of Systems   Constitutional: Negative for chills and fever.   HENT: Negative for congestion.    Genitourinary: Negative for difficulty urinating and dysuria.   Musculoskeletal: Positive for back pain and myalgias.   Neurological: Negative for weakness and numbness.       I have reviewed the review of systems as documented by my MA.      Objective     Vitals:    02/02/23 1353   BP: 101/80   Cuff Size: Adult   Pulse: 71   Temp: 97.5 °F (36.4 °C)   SpO2: 97%   Weight: 52.6 kg (116 lb)   Height: 161.3 cm (63.5\")     Body mass index is 20.23 kg/m².    Tobacco Use: Low Risk    • Smoking Tobacco Use: Never   • Smokeless Tobacco Use: Never   • Passive Exposure: Not on file          Physical Exam  Neurological:      Mental Status: She is alert and oriented to person, place, and time.       Neurologic Exam     Mental Status   Oriented to person, place, and time.           Assessment & Plan   Independent Review of Radiographic Studies:      I personally reviewed the images from the following studies.    I reviewed her plain films, myelogram, and CT scan myself.  The plain films show a solid fusion at L4-5.  There is no evidence of movement on flexion and extension.  The myelogram " shows severe pressure at L3-4 where there is also a spondylolisthesis.  The CT confirms this.  The other levels mostly look okay.    Medical Decision Making:      I told the patient I thought we would need to consider surgery at L3-4 with extension of the fusion up another level.  I told the patient about the risks, complications and expected outcome of the lumbar surgery.  I explained that there was an 80% chance of getting rid of the pain in the leg.  I explained that there would still be back pain after the surgery.  Initially this will be quite severe but will improve over time.  There is a 2 or 3% chance of infection, bleeding, CSF leak, damage to the nerve as a result of surgery, paralysis, as well as anesthetic risk.  There is a 10% chance of recurrent problems.  There is a 10% chance of nonunion or failure of the instrumentation.  We discussed the postoperative hospital and home course.  The patient does ask to proceed.  Her  is with her and they both ask a number of questions which were answered.    She will need to be scheduled for a: Lumbar 3 to lumbar 4 laminectomy with fusion and instrumentation using the [employer] robot and removal of previous instrumentation    Diagnoses and all orders for this visit:    1. Chronic bilateral low back pain with bilateral sciatica (Primary)      Return for 2-3 week post op.

## 2023-02-02 ENCOUNTER — PREP FOR SURGERY (OUTPATIENT)
Dept: OTHER | Facility: HOSPITAL | Age: 48
End: 2023-02-02
Payer: COMMERCIAL

## 2023-02-02 ENCOUNTER — OFFICE VISIT (OUTPATIENT)
Dept: NEUROSURGERY | Facility: CLINIC | Age: 48
End: 2023-02-02
Payer: COMMERCIAL

## 2023-02-02 VITALS
HEART RATE: 71 BPM | TEMPERATURE: 97.5 F | DIASTOLIC BLOOD PRESSURE: 80 MMHG | HEIGHT: 64 IN | BODY MASS INDEX: 19.81 KG/M2 | WEIGHT: 116 LBS | SYSTOLIC BLOOD PRESSURE: 101 MMHG | OXYGEN SATURATION: 97 %

## 2023-02-02 DIAGNOSIS — M54.42 CHRONIC BILATERAL LOW BACK PAIN WITH BILATERAL SCIATICA: Primary | Chronic | ICD-10-CM

## 2023-02-02 DIAGNOSIS — G89.29 CHRONIC BILATERAL LOW BACK PAIN WITH BILATERAL SCIATICA: Primary | ICD-10-CM

## 2023-02-02 DIAGNOSIS — M54.42 CHRONIC BILATERAL LOW BACK PAIN WITH BILATERAL SCIATICA: Primary | ICD-10-CM

## 2023-02-02 DIAGNOSIS — M54.41 CHRONIC BILATERAL LOW BACK PAIN WITH BILATERAL SCIATICA: Primary | ICD-10-CM

## 2023-02-02 DIAGNOSIS — G89.29 CHRONIC BILATERAL LOW BACK PAIN WITH BILATERAL SCIATICA: Primary | Chronic | ICD-10-CM

## 2023-02-02 DIAGNOSIS — M54.41 CHRONIC BILATERAL LOW BACK PAIN WITH BILATERAL SCIATICA: Primary | Chronic | ICD-10-CM

## 2023-02-02 PROCEDURE — 99214 OFFICE O/P EST MOD 30 MIN: CPT | Performed by: NEUROLOGICAL SURGERY

## 2023-02-02 RX ORDER — CEFAZOLIN SODIUM 2 G/100ML
2 INJECTION, SOLUTION INTRAVENOUS ONCE
Status: CANCELLED | OUTPATIENT
Start: 2023-04-26 | End: 2023-02-02

## 2023-02-03 ENCOUNTER — TELEPHONE (OUTPATIENT)
Dept: NEUROSURGERY | Facility: CLINIC | Age: 48
End: 2023-02-03

## 2023-03-16 DIAGNOSIS — F41.3 OTHER MIXED ANXIETY DISORDERS: Chronic | ICD-10-CM

## 2023-03-16 RX ORDER — CITALOPRAM 20 MG/1
20 TABLET ORAL DAILY
Qty: 30 TABLET | Refills: 0 | Status: SHIPPED | OUTPATIENT
Start: 2023-03-16

## 2023-03-20 ENCOUNTER — TELEPHONE (OUTPATIENT)
Dept: NEUROSURGERY | Facility: CLINIC | Age: 48
End: 2023-03-20
Payer: COMMERCIAL

## 2023-03-20 NOTE — TELEPHONE ENCOUNTER
OJ asking patient if was willing to have her Televisit with Dr. Olvera on 03/22/2023 instead of 03/28/2023

## 2023-03-22 ENCOUNTER — OFFICE VISIT (OUTPATIENT)
Dept: NEUROSURGERY | Facility: CLINIC | Age: 48
End: 2023-03-22
Payer: COMMERCIAL

## 2023-03-22 DIAGNOSIS — M48.062 SPINAL STENOSIS OF LUMBAR REGION WITH NEUROGENIC CLAUDICATION: Primary | Chronic | ICD-10-CM

## 2023-03-22 PROCEDURE — 99441 PR PHYS/QHP TELEPHONE EVALUATION 5-10 MIN: CPT | Performed by: NEUROLOGICAL SURGERY

## 2023-03-22 NOTE — PROGRESS NOTES
Subjective   Patient ID: Kaia Mack is a 47 y.o. female is here today via telephone for  PRE-OP follow-up.    You have chosen to receive care through a telephone visit. Do you consent to use a telephone visit for your medical care today? Yes    We had a telephone visit today.  The patient was at home and I was in the office.  We talked for 5 minutes.    History of Present Illness    This patient continues with pain in her back with radiation to her left hip and down her left leg.  She has some pain on her right side as well.  All of this is gotten worse since she was last seen in early February.    The following portions of the patient's history were reviewed and updated as appropriate: allergies, current medications, past family history, past medical history, past social history, past surgical history and problem list.    Review of Systems    I reviewed the review of systems listed by the patient and discussed by my MA    Objective       Tobacco Use: Low Risk    • Smoking Tobacco Use: Never   • Smokeless Tobacco Use: Never   • Passive Exposure: Not on file          Physical Exam  Neurological:      Mental Status: She is alert and oriented to person, place, and time.       Neurologic Exam     Mental Status   Oriented to person, place, and time.           Assessment & Plan   Independent Review of Radiographic Studies:      I personally reviewed the images from the following studies.    I again reviewed her plain films, myelogram, and CT scan.  The plain films show a solid fusion at L4-5.  The myelogram shows severe pressure at L3-4 and the CT confirms this.  The other levels look okay.    Medical Decision Making:      I again reviewed an L3-4 decompression with extension of the fusion up another level.  I told the patient about the risks, complications and expected outcome of the lumbar surgery.  I explained that there was an 80% chance of getting rid of the pain in the leg.  I explained that there would still  be back pain after the surgery.  Initially this will be quite severe but will improve over time.  There is a 2 or 3% chance of infection, bleeding, CSF leak, damage to the nerve as a result of surgery, paralysis, as well as anesthetic risk.  There is a 10% chance of recurrent problems.  There is a 10% chance of nonunion or failure of the instrumentation.  We discussed the postoperative hospital and home course.  The patient does ask to proceed.    She will need to be scheduled for a:  Lumbar 3 to lumbar 4 laminectomy with fusion and instrumentation using the [employer] robot and removal of previous instrumentation    Diagnoses and all orders for this visit:    1. Spinal stenosis of lumbar region with neurogenic claudication (Primary)      Return for 2-3 week post op.

## 2023-04-14 DIAGNOSIS — F41.3 OTHER MIXED ANXIETY DISORDERS: Chronic | ICD-10-CM

## 2023-04-14 RX ORDER — CITALOPRAM 20 MG/1
20 TABLET ORAL DAILY
Qty: 30 TABLET | Refills: 0 | Status: SHIPPED | OUTPATIENT
Start: 2023-04-14

## 2023-04-17 NOTE — H&P (VIEW-ONLY)
Subjective   Patient ID: Kaia Mack is a 47 y.o. female is here today for follow-up via telephone call to discuss whether to proceed with surgery.    You have chosen to receive care through a telephone visit. Do you consent to use a telephone visit for your medical care today? Yes    We had a telephone visit today.  The patient was at home and I was in the office.  We talked for 5 minutes.    History of Present Illness     This patient continues with pain in her back with radiation to her left hip and down her left leg.  She has pain on the right side as well.    The following portions of the patient's history were reviewed and updated as appropriate: allergies, current medications, past family history, past medical history, past social history, past surgical history and problem list.    Review of Systems    I reviewed the review of systems listed by the patient and discussed by my MA    Objective     There were no vitals filed for this visit.  There is no height or weight on file to calculate BMI.    Tobacco Use: Low Risk    • Smoking Tobacco Use: Never   • Smokeless Tobacco Use: Never   • Passive Exposure: Not on file          Physical Exam  Neurological:      Mental Status: She is alert and oriented to person, place, and time.       Neurologic Exam     Mental Status   Oriented to person, place, and time.           Assessment & Plan   Independent Review of Radiographic Studies:      I personally reviewed the images from the following studies.    I again reviewed her plain films, myelogram, and CT scan done in January of this year.  This shows a solid fusion at L4-5 but severe stenosis at L3-4.  The other levels look okay.    Medical Decision Making:      I again reviewed an L3-4 decompression with extension of the fusion up another level.  I told the patient about the risks, complications and expected outcome of the lumbar surgery.  I explained that there was an 80% chance of getting rid of the pain in the  leg.  I explained that there would still be back pain after the surgery.  Initially this will be quite severe but will improve over time.  There is a 2 or 3% chance of infection, bleeding, CSF leak, damage to the nerve as a result of surgery, paralysis, as well as anesthetic risk.  There is a 10% chance of recurrent problems.  There is a 10% chance of nonunion or failure of the instrumentation.  We discussed the postoperative hospital and home course.  The patient does ask to proceed.     She will need to be scheduled for a:  Lumbar 3 to lumbar 4 laminectomy with fusion and instrumentation using the [email or web address] robot and removal of previous instrumentation     Diagnoses and all orders for this visit:    1. Spinal stenosis of lumbar region with neurogenic claudication (Primary)      Return for 2-3 week post op.

## 2023-04-17 NOTE — PROGRESS NOTES
Subjective   Patient ID: Kaia Mack is a 47 y.o. female is here today for follow-up via telephone call to discuss whether to proceed with surgery.    You have chosen to receive care through a telephone visit. Do you consent to use a telephone visit for your medical care today? Yes    We had a telephone visit today.  The patient was at home and I was in the office.  We talked for 5 minutes.    History of Present Illness     This patient continues with pain in her back with radiation to her left hip and down her left leg.  She has pain on the right side as well.    The following portions of the patient's history were reviewed and updated as appropriate: allergies, current medications, past family history, past medical history, past social history, past surgical history and problem list.    Review of Systems    I reviewed the review of systems listed by the patient and discussed by my MA    Objective     There were no vitals filed for this visit.  There is no height or weight on file to calculate BMI.    Tobacco Use: Low Risk    • Smoking Tobacco Use: Never   • Smokeless Tobacco Use: Never   • Passive Exposure: Not on file          Physical Exam  Neurological:      Mental Status: She is alert and oriented to person, place, and time.       Neurologic Exam     Mental Status   Oriented to person, place, and time.           Assessment & Plan   Independent Review of Radiographic Studies:      I personally reviewed the images from the following studies.    I again reviewed her plain films, myelogram, and CT scan done in January of this year.  This shows a solid fusion at L4-5 but severe stenosis at L3-4.  The other levels look okay.    Medical Decision Making:      I again reviewed an L3-4 decompression with extension of the fusion up another level.  I told the patient about the risks, complications and expected outcome of the lumbar surgery.  I explained that there was an 80% chance of getting rid of the pain in the  leg.  I explained that there would still be back pain after the surgery.  Initially this will be quite severe but will improve over time.  There is a 2 or 3% chance of infection, bleeding, CSF leak, damage to the nerve as a result of surgery, paralysis, as well as anesthetic risk.  There is a 10% chance of recurrent problems.  There is a 10% chance of nonunion or failure of the instrumentation.  We discussed the postoperative hospital and home course.  The patient does ask to proceed.     She will need to be scheduled for a:  Lumbar 3 to lumbar 4 laminectomy with fusion and instrumentation using the Meilishuoor robot and removal of previous instrumentation     Diagnoses and all orders for this visit:    1. Spinal stenosis of lumbar region with neurogenic claudication (Primary)      Return for 2-3 week post op.

## 2023-04-19 ENCOUNTER — PRE-ADMISSION TESTING (OUTPATIENT)
Dept: PREADMISSION TESTING | Facility: HOSPITAL | Age: 48
End: 2023-04-19
Payer: COMMERCIAL

## 2023-04-19 VITALS
TEMPERATURE: 97.7 F | HEART RATE: 66 BPM | BODY MASS INDEX: 20.18 KG/M2 | WEIGHT: 118.2 LBS | SYSTOLIC BLOOD PRESSURE: 94 MMHG | HEIGHT: 64 IN | RESPIRATION RATE: 16 BRPM | OXYGEN SATURATION: 98 % | DIASTOLIC BLOOD PRESSURE: 55 MMHG

## 2023-04-19 LAB
ANION GAP SERPL CALCULATED.3IONS-SCNC: 11.7 MMOL/L (ref 5–15)
BUN SERPL-MCNC: 20 MG/DL (ref 6–20)
BUN/CREAT SERPL: 25.3 (ref 7–25)
CALCIUM SPEC-SCNC: 9.2 MG/DL (ref 8.6–10.5)
CHLORIDE SERPL-SCNC: 102 MMOL/L (ref 98–107)
CO2 SERPL-SCNC: 26.3 MMOL/L (ref 22–29)
CREAT SERPL-MCNC: 0.79 MG/DL (ref 0.57–1)
DEPRECATED RDW RBC AUTO: 38 FL (ref 37–54)
EGFRCR SERPLBLD CKD-EPI 2021: 94.1 ML/MIN/1.73
ERYTHROCYTE [DISTWIDTH] IN BLOOD BY AUTOMATED COUNT: 11.1 % (ref 12.3–15.4)
GLUCOSE SERPL-MCNC: 81 MG/DL (ref 65–99)
HCG SERPL QL: NEGATIVE
HCT VFR BLD AUTO: 37 % (ref 34–46.6)
HGB BLD-MCNC: 12.9 G/DL (ref 12–15.9)
MCH RBC QN AUTO: 32.7 PG (ref 26.6–33)
MCHC RBC AUTO-ENTMCNC: 34.9 G/DL (ref 31.5–35.7)
MCV RBC AUTO: 93.9 FL (ref 79–97)
PLATELET # BLD AUTO: 212 10*3/MM3 (ref 140–450)
PMV BLD AUTO: 10.1 FL (ref 6–12)
POTASSIUM SERPL-SCNC: 3.8 MMOL/L (ref 3.5–5.2)
QT INTERVAL: 451 MS
RBC # BLD AUTO: 3.94 10*6/MM3 (ref 3.77–5.28)
SODIUM SERPL-SCNC: 140 MMOL/L (ref 136–145)
WBC NRBC COR # BLD: 5.46 10*3/MM3 (ref 3.4–10.8)

## 2023-04-19 PROCEDURE — 85027 COMPLETE CBC AUTOMATED: CPT

## 2023-04-19 PROCEDURE — 36415 COLL VENOUS BLD VENIPUNCTURE: CPT

## 2023-04-19 PROCEDURE — 84703 CHORIONIC GONADOTROPIN ASSAY: CPT

## 2023-04-19 PROCEDURE — 80048 BASIC METABOLIC PNL TOTAL CA: CPT

## 2023-04-19 PROCEDURE — 93005 ELECTROCARDIOGRAM TRACING: CPT

## 2023-04-19 PROCEDURE — 93010 ELECTROCARDIOGRAM REPORT: CPT | Performed by: INTERNAL MEDICINE

## 2023-04-19 RX ORDER — CHLORHEXIDINE GLUCONATE 500 MG/1
CLOTH TOPICAL
Status: ON HOLD | COMMUNITY
End: 2023-04-26

## 2023-04-19 NOTE — DISCHARGE INSTRUCTIONS
Take the following medications the morning of surgery:  CITALOPRAM, LEVOTHYROXINE, ZYRTEC, AND FLONASE      ARRIVAL TIME IS 0730  04/26/2023    If you are on prescription narcotic pain medication to control your pain you may also take that medication the morning of surgery.    General Instructions:  Do not eat solid food after midnight the night before surgery.  You may drink clear liquids day of surgery but must stop at least one hour before your hospital arrival time.  It is beneficial for you to have a clear drink that contains carbohydrates the day of surgery.  We suggest a 12 to 20 ounce bottle of Gatorade or Powerade for non-diabetic patients or a 12 to 20 ounce bottle of G2 or Powerade Zero for diabetic patients. (Pediatric patients, are not advised to drink a 12 to 20 ounce carbohydrate drink)    Clear liquids are liquids you can see through.  Nothing red in color.     Plain water                               Sports drinks  Sodas                                   Gelatin (Jell-O)  Fruit juices without pulp such as white grape juice and apple juice  Popsicles that contain no fruit or yogurt  Tea or coffee (no cream or milk added)  Gatorade / Powerade  G2 / Powerade Zero    Patients who avoid smoking, chewing tobacco and alcohol for 4 weeks prior to surgery have a reduced risk of post-operative complications.  Quit smoking as many days before surgery as you can.  Do not smoke, use chewing tobacco or drink alcohol the day of surgery.   If applicable bring your C-PAP/ BI-PAP machine in with you to preop day of surgery.  Bring any papers given to you in the doctor’s office.  Wear clean comfortable clothes.  Do not wear contact lenses, false eyelashes or make-up.  Bring a case for your glasses.   Bring crutches or walker if applicable.  Remove all piercings.  Leave jewelry and any other valuables at home.  Hair extensions with metal clips must be removed prior to surgery.  The Pre-Admission Testing nurse will  instruct you to bring medications if unable to obtain an accurate list in Pre-Admission Testing.          Preventing a Surgical Site Infection:  For 2 to 3 days before surgery, avoid shaving with a razor because the razor can irritate skin and make it easier to develop an infection.    Any areas of open skin can increase the risk of a post-operative wound infection by allowing bacteria to enter and travel throughout the body.  Notify your surgeon if you have any skin wounds / rashes even if it is not near the expected surgical site.  The area will need assessed to determine if surgery should be delayed until it is healed.  The night prior to surgery shower using a fresh bar of anti-bacterial soap (such as Dial) and clean washcloth.  Sleep in a clean bed with clean clothing.  Do not allow pets to sleep with you.  Shower on the morning of surgery using a fresh bar of anti-bacterial soap (such as Dial) and clean washcloth.  Dry with a clean towel and dress in clean clothing.  Ask your surgeon if you will be receiving antibiotics prior to surgery.  Make sure you, your family, and all healthcare providers clean their hands with soap and water or an alcohol based hand  before caring for you or your wound.      CHLORHEXIDINE CLOTH INSTRUCTIONS  The morning of surgery follow these instructions using the Chlorhexidine cloths you've been given.  These steps reduce bacteria on the body.  Do not use the cloths near your eyes, ears mouth, genitalia or on open wounds.  Throw the cloths away after use but do not try to flush them down a toilet.      Open and remove one cloth at a time from the package.    Leave the cloth unfolded and begin the bathing.  Massage the skin with the cloths using gentle pressure to remove bacteria.  Do not scrub harshly.   Follow the steps below with one 2% CHG cloth per area (6 total cloths).  One cloth for neck, shoulders and chest.  One cloth for both arms, hands, fingers and underarms (do  underarms last).  One cloth for the abdomen followed by groin.  One cloth for right leg and foot including between the toes.  One cloth for left leg and foot including between the toes.  The last cloth is to be used for the back of the neck, back and buttocks.    Allow the CHG to air dry 3 minutes on the skin which will give it time to work and decrease the chance of irritation.  The skin may feel sticky until it is dry.  Do not rinse with water or any other liquid or you will lose the beneficial effects of the CHG.  If mild skin irritation occurs, do rinse the skin to remove the CHG.  Report this to the nurse at time of admission.  Do not apply lotions, creams, ointments, deodorants or perfumes after using the clothes. Dress in clean clothes before coming to the hospital.     Day of surgery:  Your arrival time is approximately two hours before your scheduled surgery time.  Upon arrival, a Pre-op nurse and Anesthesiologist will review your health history, obtain vital signs, and answer questions you may have.  The only belongings needed at this time will be a list of your home medications and if applicable your C-PAP/BI-PAP machine.  A Pre-op nurse will start an IV and you may receive medication in preparation for surgery, including something to help you relax.     Please be aware that surgery does come with discomfort.  We want to make every effort to control your discomfort so please discuss any uncontrolled symptoms with your nurse.   Your doctor will most likely have prescribed pain medications.      If you are going home after surgery you will receive individualized written care instructions before being discharged.  A responsible adult must drive you to and from the hospital on the day of your surgery and stay with you for 24 hours.  Discharge prescriptions can be filled by the hospital pharmacy during regular pharmacy hours.  If you are having surgery late in the day/evening your prescription may be  e-prescribed to your pharmacy.  Please verify your pharmacy hours or chose a 24 hour pharmacy to avoid not having access to your prescription because your pharmacy has closed for the day.    If you are staying overnight following surgery, you will be transported to your hospital room following the recovery period.  [facility] has all private rooms.    If you have any questions please call Pre-Admission Testing at [phone].  Deductibles and co-payments are collected on the day of service. Please be prepared to pay the required co-pay, deductible or deposit on the day of service as defined by your plan.    Call your surgeon immediately if you experience any of the following symptoms:  Sore Throat  Shortness of Breath or difficulty breathing  Cough  Chills  Body soreness or muscle pain  Headache  Fever  New loss of taste or smell  Do not arrive for your surgery ill.  Your procedure will need to be rescheduled to another time.  You will need to call your physician before the day of surgery to avoid any unnecessary exposure to hospital staff as well as other patients.

## 2023-04-20 ENCOUNTER — OFFICE VISIT (OUTPATIENT)
Dept: NEUROSURGERY | Facility: CLINIC | Age: 48
End: 2023-04-20
Payer: COMMERCIAL

## 2023-04-20 DIAGNOSIS — M48.062 SPINAL STENOSIS OF LUMBAR REGION WITH NEUROGENIC CLAUDICATION: Primary | Chronic | ICD-10-CM

## 2023-04-20 PROCEDURE — 99441 PR PHYS/QHP TELEPHONE EVALUATION 5-10 MIN: CPT | Performed by: NEUROLOGICAL SURGERY

## 2023-04-26 ENCOUNTER — ANESTHESIA (OUTPATIENT)
Dept: PERIOP | Facility: HOSPITAL | Age: 48
End: 2023-04-26
Payer: COMMERCIAL

## 2023-04-26 ENCOUNTER — HOSPITAL ENCOUNTER (OUTPATIENT)
Facility: HOSPITAL | Age: 48
Discharge: HOME | End: 2023-04-29
Attending: NEUROLOGICAL SURGERY | Admitting: NEUROLOGICAL SURGERY
Payer: COMMERCIAL

## 2023-04-26 ENCOUNTER — ANESTHESIA EVENT (OUTPATIENT)
Dept: PERIOP | Facility: HOSPITAL | Age: 48
End: 2023-04-26
Payer: COMMERCIAL

## 2023-04-26 ENCOUNTER — APPOINTMENT (OUTPATIENT)
Dept: GENERAL RADIOLOGY | Facility: HOSPITAL | Age: 48
End: 2023-04-26
Payer: COMMERCIAL

## 2023-04-26 DIAGNOSIS — Z98.1 S/P LUMBAR FUSION: Primary | ICD-10-CM

## 2023-04-26 PROBLEM — M48.061 LUMBAR SPINAL STENOSIS: Status: ACTIVE | Noted: 2023-04-26

## 2023-04-26 LAB
B-HCG UR QL: NEGATIVE
EXPIRATION DATE: NORMAL
INTERNAL NEGATIVE CONTROL: NORMAL
INTERNAL POSITIVE CONTROL: NORMAL
Lab: NORMAL

## 2023-04-26 PROCEDURE — 25010000002 DEXAMETHASONE SODIUM PHOSPHATE 20 MG/5ML SOLUTION: Performed by: NURSE ANESTHETIST, CERTIFIED REGISTERED

## 2023-04-26 PROCEDURE — 61783 SCAN PROC SPINAL: CPT | Performed by: NEUROLOGICAL SURGERY

## 2023-04-26 PROCEDURE — 25010000002 FENTANYL CITRATE (PF) 50 MCG/ML SOLUTION: Performed by: NURSE ANESTHETIST, CERTIFIED REGISTERED

## 2023-04-26 PROCEDURE — 22853 INSJ BIOMECHANICAL DEVICE: CPT | Performed by: SPECIALIST/TECHNOLOGIST, OTHER

## 2023-04-26 PROCEDURE — 25010000002 MAGNESIUM SULFATE PER 500 MG OF MAGNESIUM: Performed by: NURSE ANESTHETIST, CERTIFIED REGISTERED

## 2023-04-26 PROCEDURE — C1713 ANCHOR/SCREW BN/BN,TIS/BN: HCPCS | Performed by: NEUROLOGICAL SURGERY

## 2023-04-26 PROCEDURE — 25010000002 ONDANSETRON PER 1 MG: Performed by: NURSE ANESTHETIST, CERTIFIED REGISTERED

## 2023-04-26 PROCEDURE — 22840 INSERT SPINE FIXATION DEVICE: CPT | Performed by: NEUROLOGICAL SURGERY

## 2023-04-26 PROCEDURE — 63052 LAM FACETC/FRMT ARTHRD LUM 1: CPT | Performed by: NEUROLOGICAL SURGERY

## 2023-04-26 PROCEDURE — 25010000002 PROPOFOL 10 MG/ML EMULSION: Performed by: NURSE ANESTHETIST, CERTIFIED REGISTERED

## 2023-04-26 PROCEDURE — 25010000002 MORPHINE PER 10 MG: Performed by: NEUROLOGICAL SURGERY

## 2023-04-26 PROCEDURE — 25010000002 SODIUM CHLORIDE 0.9 % WITH KCL 20 MEQ 20-0.9 MEQ/L-% SOLUTION: Performed by: NEUROLOGICAL SURGERY

## 2023-04-26 PROCEDURE — 25010000002 ONDANSETRON PER 1 MG: Performed by: NEUROLOGICAL SURGERY

## 2023-04-26 PROCEDURE — 25010000002 KETOROLAC TROMETHAMINE PER 15 MG: Performed by: NURSE ANESTHETIST, CERTIFIED REGISTERED

## 2023-04-26 PROCEDURE — 25010000002 MIDAZOLAM PER 1 MG: Performed by: ANESTHESIOLOGY

## 2023-04-26 PROCEDURE — 22630 ARTHRD PST TQ 1NTRSPC LUM: CPT | Performed by: NEUROLOGICAL SURGERY

## 2023-04-26 PROCEDURE — 63052 LAM FACETC/FRMT ARTHRD LUM 1: CPT | Performed by: SPECIALIST/TECHNOLOGIST, OTHER

## 2023-04-26 PROCEDURE — 72100 X-RAY EXAM L-S SPINE 2/3 VWS: CPT

## 2023-04-26 PROCEDURE — 25010000002 HYDROMORPHONE 1 MG/ML SOLUTION: Performed by: NURSE ANESTHETIST, CERTIFIED REGISTERED

## 2023-04-26 PROCEDURE — 22840 INSERT SPINE FIXATION DEVICE: CPT | Performed by: SPECIALIST/TECHNOLOGIST, OTHER

## 2023-04-26 PROCEDURE — 25010000002 PHENYLEPHRINE 10 MG/ML SOLUTION: Performed by: NURSE ANESTHETIST, CERTIFIED REGISTERED

## 2023-04-26 PROCEDURE — 25010000002 CEFAZOLIN IN DEXTROSE 2-4 GM/100ML-% SOLUTION: Performed by: NEUROLOGICAL SURGERY

## 2023-04-26 PROCEDURE — 22630 ARTHRD PST TQ 1NTRSPC LUM: CPT | Performed by: SPECIALIST/TECHNOLOGIST, OTHER

## 2023-04-26 PROCEDURE — 25010000002 HYDROMORPHONE PER 4 MG: Performed by: NURSE ANESTHETIST, CERTIFIED REGISTERED

## 2023-04-26 PROCEDURE — 22853 INSJ BIOMECHANICAL DEVICE: CPT | Performed by: NEUROLOGICAL SURGERY

## 2023-04-26 PROCEDURE — 25010000002 SUGAMMADEX 200 MG/2ML SOLUTION: Performed by: NURSE ANESTHETIST, CERTIFIED REGISTERED

## 2023-04-26 PROCEDURE — 81025 URINE PREGNANCY TEST: CPT | Performed by: NEUROLOGICAL SURGERY

## 2023-04-26 PROCEDURE — 25010000002 MIDAZOLAM PER 1 MG: Performed by: NURSE ANESTHETIST, CERTIFIED REGISTERED

## 2023-04-26 PROCEDURE — 76000 FLUOROSCOPY <1 HR PHYS/QHP: CPT

## 2023-04-26 PROCEDURE — 25010000002 CEFAZOLIN IN DEXTROSE 2-4 GM/100ML-% SOLUTION: Performed by: NURSE ANESTHETIST, CERTIFIED REGISTERED

## 2023-04-26 PROCEDURE — 25010000002 VANCOMYCIN 1 G RECONSTITUTED SOLUTION 1 EACH VIAL: Performed by: NEUROLOGICAL SURGERY

## 2023-04-26 DEVICE — FLOSEAL WITH RECOTHROM - 5ML
Type: IMPLANTABLE DEVICE | Site: EPIDURAL SPACE | Status: FUNCTIONAL
Brand: FLOSEAL HEMOSTATIC MATRIX

## 2023-04-26 DEVICE — ADHERUS AUTOSPRAY EXTENDED TIP (ET) DURAL SEALANT IS A STERILE, SINGLE-USE, ELECTROMECHANICAL, BATTERY OPERATED DEVICE WITH INTERNAL SYSTEM COMPONENTS THAT PROVIDE AIR FLOW TO AID IN THE DELIVERY OF A SYNTHETIC, ABSORBABLE, TWO-COMPONENT HYDROGEL SEALANT SYSTEM AND ALLOW DELIVERY TO BE INTERRUPTED WITHOUT CLOGGING.
Type: IMPLANTABLE DEVICE | Site: EPIDURAL SPACE | Status: FUNCTIONAL
Brand: ADHERUS AUTOSPRAY ET DURAL SEALANT

## 2023-04-26 DEVICE — SET SCREW 5540030 5.5 TI NS BRK OFF
Type: IMPLANTABLE DEVICE | Site: EPIDURAL SPACE | Status: FUNCTIONAL
Brand: CD HORIZON® SPINAL SYSTEM

## 2023-04-26 DEVICE — ALLOGRFT DBM GRAFTON DS INJ FIBR 6CC: Type: IMPLANTABLE DEVICE | Site: EPIDURAL SPACE | Status: FUNCTIONAL

## 2023-04-26 DEVICE — SSC BONE WAX
Type: IMPLANTABLE DEVICE | Site: EPIDURAL SPACE | Status: FUNCTIONAL
Brand: SSC BONE WAX

## 2023-04-26 DEVICE — SPACER 6068073 CATALYFT PL SHORT 7MM
Type: IMPLANTABLE DEVICE | Site: EPIDURAL SPACE | Status: FUNCTIONAL
Brand: CATALYFT PL EXPANDABLE INTERBODY SYSTEM

## 2023-04-26 RX ORDER — CEFAZOLIN SODIUM 2 G/100ML
INJECTION, SOLUTION INTRAVENOUS AS NEEDED
Status: DISCONTINUED | OUTPATIENT
Start: 2023-04-26 | End: 2023-04-26 | Stop reason: SURG

## 2023-04-26 RX ORDER — ONDANSETRON 2 MG/ML
4 INJECTION INTRAMUSCULAR; INTRAVENOUS EVERY 6 HOURS PRN
Status: DISCONTINUED | OUTPATIENT
Start: 2023-04-26 | End: 2023-04-29 | Stop reason: HOSPADM

## 2023-04-26 RX ORDER — ONDANSETRON 2 MG/ML
INJECTION INTRAMUSCULAR; INTRAVENOUS AS NEEDED
Status: DISCONTINUED | OUTPATIENT
Start: 2023-04-26 | End: 2023-04-26 | Stop reason: SURG

## 2023-04-26 RX ORDER — MAGNESIUM SULFATE HEPTAHYDRATE 500 MG/ML
INJECTION, SOLUTION INTRAMUSCULAR; INTRAVENOUS AS NEEDED
Status: DISCONTINUED | OUTPATIENT
Start: 2023-04-26 | End: 2023-04-26 | Stop reason: SURG

## 2023-04-26 RX ORDER — KETAMINE HCL IN NACL, ISO-OSM 100MG/10ML
SYRINGE (ML) INJECTION AS NEEDED
Status: DISCONTINUED | OUTPATIENT
Start: 2023-04-26 | End: 2023-04-26 | Stop reason: SURG

## 2023-04-26 RX ORDER — SODIUM CHLORIDE, SODIUM LACTATE, POTASSIUM CHLORIDE, CALCIUM CHLORIDE 600; 310; 30; 20 MG/100ML; MG/100ML; MG/100ML; MG/100ML
9 INJECTION, SOLUTION INTRAVENOUS CONTINUOUS
Status: DISCONTINUED | OUTPATIENT
Start: 2023-04-26 | End: 2023-04-26

## 2023-04-26 RX ORDER — SCOLOPAMINE TRANSDERMAL SYSTEM 1 MG/1
1 PATCH, EXTENDED RELEASE TRANSDERMAL ONCE
Status: DISCONTINUED | OUTPATIENT
Start: 2023-04-26 | End: 2023-04-26

## 2023-04-26 RX ORDER — NALOXONE HCL 0.4 MG/ML
0.4 VIAL (ML) INJECTION
Status: DISCONTINUED | OUTPATIENT
Start: 2023-04-26 | End: 2023-04-29 | Stop reason: HOSPADM

## 2023-04-26 RX ORDER — FLUMAZENIL 0.1 MG/ML
0.2 INJECTION INTRAVENOUS AS NEEDED
Status: DISCONTINUED | OUTPATIENT
Start: 2023-04-26 | End: 2023-04-26 | Stop reason: HOSPADM

## 2023-04-26 RX ORDER — MORPHINE SULFATE 2 MG/ML
2 INJECTION, SOLUTION INTRAMUSCULAR; INTRAVENOUS EVERY 4 HOURS PRN
Status: DISCONTINUED | OUTPATIENT
Start: 2023-04-26 | End: 2023-04-29 | Stop reason: HOSPADM

## 2023-04-26 RX ORDER — MIDAZOLAM HYDROCHLORIDE 1 MG/ML
1 INJECTION INTRAMUSCULAR; INTRAVENOUS
Status: DISCONTINUED | OUTPATIENT
Start: 2023-04-26 | End: 2023-04-26 | Stop reason: HOSPADM

## 2023-04-26 RX ORDER — LIDOCAINE HYDROCHLORIDE 10 MG/ML
0.5 INJECTION, SOLUTION EPIDURAL; INFILTRATION; INTRACAUDAL; PERINEURAL ONCE AS NEEDED
Status: DISCONTINUED | OUTPATIENT
Start: 2023-04-26 | End: 2023-04-26 | Stop reason: HOSPADM

## 2023-04-26 RX ORDER — SODIUM CHLORIDE 9 MG/ML
INJECTION, SOLUTION INTRAVENOUS CONTINUOUS PRN
Status: DISCONTINUED | OUTPATIENT
Start: 2023-04-26 | End: 2023-04-26 | Stop reason: SURG

## 2023-04-26 RX ORDER — LABETALOL HYDROCHLORIDE 5 MG/ML
5 INJECTION, SOLUTION INTRAVENOUS
Status: DISCONTINUED | OUTPATIENT
Start: 2023-04-26 | End: 2023-04-26 | Stop reason: HOSPADM

## 2023-04-26 RX ORDER — HYDRALAZINE HYDROCHLORIDE 20 MG/ML
5 INJECTION INTRAMUSCULAR; INTRAVENOUS
Status: DISCONTINUED | OUTPATIENT
Start: 2023-04-26 | End: 2023-04-26 | Stop reason: HOSPADM

## 2023-04-26 RX ORDER — NALOXONE HCL 0.4 MG/ML
0.2 VIAL (ML) INJECTION AS NEEDED
Status: DISCONTINUED | OUTPATIENT
Start: 2023-04-26 | End: 2023-04-26 | Stop reason: HOSPADM

## 2023-04-26 RX ORDER — DIPHENHYDRAMINE HYDROCHLORIDE 50 MG/ML
12.5 INJECTION INTRAMUSCULAR; INTRAVENOUS
Status: DISCONTINUED | OUTPATIENT
Start: 2023-04-26 | End: 2023-04-26 | Stop reason: HOSPADM

## 2023-04-26 RX ORDER — EPHEDRINE SULFATE 50 MG/ML
INJECTION INTRAVENOUS AS NEEDED
Status: DISCONTINUED | OUTPATIENT
Start: 2023-04-26 | End: 2023-04-26 | Stop reason: SURG

## 2023-04-26 RX ORDER — HYDROCODONE BITARTRATE AND ACETAMINOPHEN 5; 325 MG/1; MG/1
1 TABLET ORAL ONCE AS NEEDED
Status: DISCONTINUED | OUTPATIENT
Start: 2023-04-26 | End: 2023-04-26 | Stop reason: HOSPADM

## 2023-04-26 RX ORDER — EPHEDRINE SULFATE 50 MG/ML
5 INJECTION, SOLUTION INTRAVENOUS ONCE AS NEEDED
Status: DISCONTINUED | OUTPATIENT
Start: 2023-04-26 | End: 2023-04-26 | Stop reason: HOSPADM

## 2023-04-26 RX ORDER — CEFAZOLIN SODIUM 2 G/100ML
2 INJECTION, SOLUTION INTRAVENOUS ONCE
Status: COMPLETED | OUTPATIENT
Start: 2023-04-26 | End: 2023-04-26

## 2023-04-26 RX ORDER — KETOROLAC TROMETHAMINE 30 MG/ML
INJECTION, SOLUTION INTRAMUSCULAR; INTRAVENOUS AS NEEDED
Status: DISCONTINUED | OUTPATIENT
Start: 2023-04-26 | End: 2023-04-26 | Stop reason: SURG

## 2023-04-26 RX ORDER — DROPERIDOL 2.5 MG/ML
0.62 INJECTION, SOLUTION INTRAMUSCULAR; INTRAVENOUS
Status: DISCONTINUED | OUTPATIENT
Start: 2023-04-26 | End: 2023-04-26 | Stop reason: HOSPADM

## 2023-04-26 RX ORDER — HYDROCODONE BITARTRATE AND ACETAMINOPHEN 7.5; 325 MG/1; MG/1
1 TABLET ORAL EVERY 4 HOURS PRN
Status: DISCONTINUED | OUTPATIENT
Start: 2023-04-26 | End: 2023-04-26 | Stop reason: HOSPADM

## 2023-04-26 RX ORDER — MIDAZOLAM HYDROCHLORIDE 1 MG/ML
INJECTION INTRAMUSCULAR; INTRAVENOUS AS NEEDED
Status: DISCONTINUED | OUTPATIENT
Start: 2023-04-26 | End: 2023-04-26 | Stop reason: SURG

## 2023-04-26 RX ORDER — ROCURONIUM BROMIDE 10 MG/ML
INJECTION, SOLUTION INTRAVENOUS AS NEEDED
Status: DISCONTINUED | OUTPATIENT
Start: 2023-04-26 | End: 2023-04-26 | Stop reason: SURG

## 2023-04-26 RX ORDER — SODIUM CHLORIDE 0.9 % (FLUSH) 0.9 %
3 SYRINGE (ML) INJECTION EVERY 12 HOURS SCHEDULED
Status: DISCONTINUED | OUTPATIENT
Start: 2023-04-26 | End: 2023-04-29 | Stop reason: HOSPADM

## 2023-04-26 RX ORDER — FENTANYL CITRATE 50 UG/ML
50 INJECTION, SOLUTION INTRAMUSCULAR; INTRAVENOUS ONCE AS NEEDED
Status: DISCONTINUED | OUTPATIENT
Start: 2023-04-26 | End: 2023-04-26 | Stop reason: HOSPADM

## 2023-04-26 RX ORDER — LEVOTHYROXINE SODIUM 88 UG/1
88 TABLET ORAL
Status: DISCONTINUED | OUTPATIENT
Start: 2023-04-27 | End: 2023-04-29 | Stop reason: HOSPADM

## 2023-04-26 RX ORDER — SODIUM CHLORIDE 9 MG/ML
INJECTION, SOLUTION INTRAVENOUS AS NEEDED
Status: DISCONTINUED | OUTPATIENT
Start: 2023-04-26 | End: 2023-04-26 | Stop reason: HOSPADM

## 2023-04-26 RX ORDER — HYDROMORPHONE HYDROCHLORIDE 1 MG/ML
0.25 INJECTION, SOLUTION INTRAMUSCULAR; INTRAVENOUS; SUBCUTANEOUS
Status: DISCONTINUED | OUTPATIENT
Start: 2023-04-26 | End: 2023-04-26 | Stop reason: HOSPADM

## 2023-04-26 RX ORDER — HYDROCODONE BITARTRATE AND ACETAMINOPHEN 5; 325 MG/1; MG/1
1 TABLET ORAL EVERY 4 HOURS PRN
Status: DISCONTINUED | OUTPATIENT
Start: 2023-04-26 | End: 2023-04-29 | Stop reason: HOSPADM

## 2023-04-26 RX ORDER — PROMETHAZINE HYDROCHLORIDE 25 MG/1
25 TABLET ORAL ONCE AS NEEDED
Status: DISCONTINUED | OUTPATIENT
Start: 2023-04-26 | End: 2023-04-26 | Stop reason: HOSPADM

## 2023-04-26 RX ORDER — SODIUM CHLORIDE 0.9 % (FLUSH) 0.9 %
3-10 SYRINGE (ML) INJECTION AS NEEDED
Status: DISCONTINUED | OUTPATIENT
Start: 2023-04-26 | End: 2023-04-26 | Stop reason: HOSPADM

## 2023-04-26 RX ORDER — SODIUM CHLORIDE 0.9 % (FLUSH) 0.9 %
10 SYRINGE (ML) INJECTION AS NEEDED
Status: DISCONTINUED | OUTPATIENT
Start: 2023-04-26 | End: 2023-04-29 | Stop reason: HOSPADM

## 2023-04-26 RX ORDER — SODIUM CHLORIDE 0.9 % (FLUSH) 0.9 %
3 SYRINGE (ML) INJECTION EVERY 12 HOURS SCHEDULED
Status: DISCONTINUED | OUTPATIENT
Start: 2023-04-26 | End: 2023-04-26 | Stop reason: HOSPADM

## 2023-04-26 RX ORDER — FENTANYL CITRATE 50 UG/ML
INJECTION, SOLUTION INTRAMUSCULAR; INTRAVENOUS AS NEEDED
Status: DISCONTINUED | OUTPATIENT
Start: 2023-04-26 | End: 2023-04-26 | Stop reason: SURG

## 2023-04-26 RX ORDER — PROPOFOL 10 MG/ML
VIAL (ML) INTRAVENOUS AS NEEDED
Status: DISCONTINUED | OUTPATIENT
Start: 2023-04-26 | End: 2023-04-26 | Stop reason: SURG

## 2023-04-26 RX ORDER — SODIUM CHLORIDE AND POTASSIUM CHLORIDE 150; 900 MG/100ML; MG/100ML
100 INJECTION, SOLUTION INTRAVENOUS CONTINUOUS
Status: DISCONTINUED | OUTPATIENT
Start: 2023-04-26 | End: 2023-04-28

## 2023-04-26 RX ORDER — FAMOTIDINE 10 MG/ML
20 INJECTION, SOLUTION INTRAVENOUS ONCE
Status: COMPLETED | OUTPATIENT
Start: 2023-04-26 | End: 2023-04-26

## 2023-04-26 RX ORDER — PHENYLEPHRINE HYDROCHLORIDE 10 MG/ML
INJECTION INTRAVENOUS AS NEEDED
Status: DISCONTINUED | OUTPATIENT
Start: 2023-04-26 | End: 2023-04-26 | Stop reason: SURG

## 2023-04-26 RX ORDER — DEXAMETHASONE SODIUM PHOSPHATE 4 MG/ML
INJECTION, SOLUTION INTRA-ARTICULAR; INTRALESIONAL; INTRAMUSCULAR; INTRAVENOUS; SOFT TISSUE AS NEEDED
Status: DISCONTINUED | OUTPATIENT
Start: 2023-04-26 | End: 2023-04-26 | Stop reason: SURG

## 2023-04-26 RX ORDER — PROMETHAZINE HYDROCHLORIDE 25 MG/1
25 SUPPOSITORY RECTAL ONCE AS NEEDED
Status: DISCONTINUED | OUTPATIENT
Start: 2023-04-26 | End: 2023-04-26 | Stop reason: HOSPADM

## 2023-04-26 RX ORDER — IPRATROPIUM BROMIDE AND ALBUTEROL SULFATE 2.5; .5 MG/3ML; MG/3ML
3 SOLUTION RESPIRATORY (INHALATION) ONCE AS NEEDED
Status: DISCONTINUED | OUTPATIENT
Start: 2023-04-26 | End: 2023-04-26 | Stop reason: HOSPADM

## 2023-04-26 RX ORDER — CEFAZOLIN SODIUM 2 G/100ML
2 INJECTION, SOLUTION INTRAVENOUS EVERY 8 HOURS
Status: COMPLETED | OUTPATIENT
Start: 2023-04-26 | End: 2023-04-27

## 2023-04-26 RX ORDER — LIDOCAINE HYDROCHLORIDE 20 MG/ML
INJECTION, SOLUTION INFILTRATION; PERINEURAL AS NEEDED
Status: DISCONTINUED | OUTPATIENT
Start: 2023-04-26 | End: 2023-04-26 | Stop reason: SURG

## 2023-04-26 RX ORDER — CITALOPRAM 20 MG/1
20 TABLET ORAL DAILY
Status: DISCONTINUED | OUTPATIENT
Start: 2023-04-26 | End: 2023-04-29 | Stop reason: HOSPADM

## 2023-04-26 RX ORDER — ONDANSETRON 2 MG/ML
4 INJECTION INTRAMUSCULAR; INTRAVENOUS ONCE AS NEEDED
Status: DISCONTINUED | OUTPATIENT
Start: 2023-04-26 | End: 2023-04-26 | Stop reason: HOSPADM

## 2023-04-26 RX ORDER — ONDANSETRON 4 MG/1
4 TABLET, FILM COATED ORAL EVERY 6 HOURS PRN
Status: DISCONTINUED | OUTPATIENT
Start: 2023-04-26 | End: 2023-04-29 | Stop reason: HOSPADM

## 2023-04-26 RX ORDER — SODIUM CHLORIDE 9 MG/ML
40 INJECTION, SOLUTION INTRAVENOUS AS NEEDED
Status: DISCONTINUED | OUTPATIENT
Start: 2023-04-26 | End: 2023-04-29 | Stop reason: HOSPADM

## 2023-04-26 RX ORDER — DEXMEDETOMIDINE HYDROCHLORIDE 100 UG/ML
INJECTION, SOLUTION INTRAVENOUS AS NEEDED
Status: DISCONTINUED | OUTPATIENT
Start: 2023-04-26 | End: 2023-04-26 | Stop reason: SURG

## 2023-04-26 RX ORDER — FENTANYL CITRATE 50 UG/ML
25 INJECTION, SOLUTION INTRAMUSCULAR; INTRAVENOUS
Status: DISCONTINUED | OUTPATIENT
Start: 2023-04-26 | End: 2023-04-26 | Stop reason: HOSPADM

## 2023-04-26 RX ADMIN — Medication 3 ML: at 20:46

## 2023-04-26 RX ADMIN — ONDANSETRON 4 MG: 2 INJECTION INTRAMUSCULAR; INTRAVENOUS at 19:06

## 2023-04-26 RX ADMIN — Medication 10 MG: at 11:05

## 2023-04-26 RX ADMIN — MIDAZOLAM 1 MG: 1 INJECTION INTRAMUSCULAR; INTRAVENOUS at 09:00

## 2023-04-26 RX ADMIN — SODIUM CHLORIDE: 9 INJECTION, SOLUTION INTRAVENOUS at 13:49

## 2023-04-26 RX ADMIN — MAGNESIUM SULFATE HEPTAHYDRATE 2 G: 500 INJECTION, SOLUTION INTRAMUSCULAR; INTRAVENOUS at 09:54

## 2023-04-26 RX ADMIN — MIDAZOLAM 2 MG: 1 INJECTION INTRAMUSCULAR; INTRAVENOUS at 09:54

## 2023-04-26 RX ADMIN — EPHEDRINE SULFATE 10 MG: 50 INJECTION INTRAVENOUS at 10:23

## 2023-04-26 RX ADMIN — FAMOTIDINE 20 MG: 10 INJECTION INTRAVENOUS at 09:00

## 2023-04-26 RX ADMIN — DEXAMETHASONE SODIUM PHOSPHATE 10 MG: 4 INJECTION, SOLUTION INTRAMUSCULAR; INTRAVENOUS at 11:38

## 2023-04-26 RX ADMIN — HYDROMORPHONE HYDROCHLORIDE 0.5 MG: 1 INJECTION, SOLUTION INTRAMUSCULAR; INTRAVENOUS; SUBCUTANEOUS at 13:30

## 2023-04-26 RX ADMIN — ONDANSETRON 4 MG: 2 INJECTION INTRAMUSCULAR; INTRAVENOUS at 13:21

## 2023-04-26 RX ADMIN — Medication 5 MG: at 13:03

## 2023-04-26 RX ADMIN — Medication 25 MG: at 10:05

## 2023-04-26 RX ADMIN — HYDROMORPHONE HYDROCHLORIDE 0.25 MG: 1 INJECTION, SOLUTION INTRAMUSCULAR; INTRAVENOUS; SUBCUTANEOUS at 14:39

## 2023-04-26 RX ADMIN — FENTANYL CITRATE 50 MCG: 50 INJECTION, SOLUTION INTRAMUSCULAR; INTRAVENOUS at 09:54

## 2023-04-26 RX ADMIN — EPHEDRINE SULFATE 10 MG: 50 INJECTION INTRAVENOUS at 10:27

## 2023-04-26 RX ADMIN — HYDROMORPHONE HYDROCHLORIDE 0.25 MG: 1 INJECTION, SOLUTION INTRAMUSCULAR; INTRAVENOUS; SUBCUTANEOUS at 14:46

## 2023-04-26 RX ADMIN — KETOROLAC TROMETHAMINE 30 MG: 30 INJECTION, SOLUTION INTRAMUSCULAR at 13:21

## 2023-04-26 RX ADMIN — Medication 10 MG: at 12:04

## 2023-04-26 RX ADMIN — SODIUM CHLORIDE, POTASSIUM CHLORIDE, SODIUM LACTATE AND CALCIUM CHLORIDE 9 ML/HR: 600; 310; 30; 20 INJECTION, SOLUTION INTRAVENOUS at 09:02

## 2023-04-26 RX ADMIN — HYDROMORPHONE HYDROCHLORIDE 0.25 MG: 1 INJECTION, SOLUTION INTRAMUSCULAR; INTRAVENOUS; SUBCUTANEOUS at 14:25

## 2023-04-26 RX ADMIN — CEFAZOLIN SODIUM 2 G: 2 INJECTION, SOLUTION INTRAVENOUS at 13:21

## 2023-04-26 RX ADMIN — HYDROMORPHONE HYDROCHLORIDE 0.5 MG: 1 INJECTION, SOLUTION INTRAMUSCULAR; INTRAVENOUS; SUBCUTANEOUS at 13:35

## 2023-04-26 RX ADMIN — FENTANYL CITRATE 25 MCG: 50 INJECTION, SOLUTION INTRAMUSCULAR; INTRAVENOUS at 14:46

## 2023-04-26 RX ADMIN — ROCURONIUM BROMIDE 20 MG: 10 INJECTION, SOLUTION INTRAVENOUS at 12:28

## 2023-04-26 RX ADMIN — SUGAMMADEX 200 MG: 100 INJECTION, SOLUTION INTRAVENOUS at 13:25

## 2023-04-26 RX ADMIN — CEFAZOLIN SODIUM 2 G: 2 INJECTION, SOLUTION INTRAVENOUS at 09:44

## 2023-04-26 RX ADMIN — ROCURONIUM BROMIDE 50 MG: 10 INJECTION, SOLUTION INTRAVENOUS at 09:59

## 2023-04-26 RX ADMIN — SCOPALAMINE 1 PATCH: 1 PATCH, EXTENDED RELEASE TRANSDERMAL at 09:00

## 2023-04-26 RX ADMIN — EPHEDRINE SULFATE 10 MG: 50 INJECTION INTRAVENOUS at 11:17

## 2023-04-26 RX ADMIN — DEXMEDETOMIDINE 10 MCG: 100 INJECTION, SOLUTION, CONCENTRATE INTRAVENOUS at 13:30

## 2023-04-26 RX ADMIN — LIDOCAINE HYDROCHLORIDE 100 MG: 20 INJECTION, SOLUTION INFILTRATION; PERINEURAL at 09:58

## 2023-04-26 RX ADMIN — FENTANYL CITRATE 25 MCG: 50 INJECTION, SOLUTION INTRAMUSCULAR; INTRAVENOUS at 14:39

## 2023-04-26 RX ADMIN — PHENYLEPHRINE HYDROCHLORIDE 200 MCG: 10 INJECTION, SOLUTION INTRAVENOUS at 10:23

## 2023-04-26 RX ADMIN — MORPHINE SULFATE 2 MG: 2 INJECTION, SOLUTION INTRAMUSCULAR; INTRAVENOUS at 20:53

## 2023-04-26 RX ADMIN — PROPOFOL 200 MG: 10 INJECTION, EMULSION INTRAVENOUS at 09:58

## 2023-04-26 RX ADMIN — POTASSIUM CHLORIDE AND SODIUM CHLORIDE 100 ML/HR: 900; 150 INJECTION, SOLUTION INTRAVENOUS at 20:46

## 2023-04-26 RX ADMIN — HYDROMORPHONE HYDROCHLORIDE 0.25 MG: 1 INJECTION, SOLUTION INTRAMUSCULAR; INTRAVENOUS; SUBCUTANEOUS at 14:32

## 2023-04-26 RX ADMIN — MORPHINE SULFATE 2 MG: 2 INJECTION, SOLUTION INTRAMUSCULAR; INTRAVENOUS at 16:51

## 2023-04-26 RX ADMIN — FENTANYL CITRATE 25 MCG: 50 INJECTION, SOLUTION INTRAMUSCULAR; INTRAVENOUS at 14:32

## 2023-04-26 RX ADMIN — HYDROCODONE BITARTRATE AND ACETAMINOPHEN 1 TABLET: 5; 325 TABLET ORAL at 17:57

## 2023-04-26 RX ADMIN — FENTANYL CITRATE 25 MCG: 50 INJECTION, SOLUTION INTRAMUSCULAR; INTRAVENOUS at 14:25

## 2023-04-26 RX ADMIN — SODIUM CHLORIDE: 9 INJECTION, SOLUTION INTRAVENOUS at 11:10

## 2023-04-26 RX ADMIN — HYDROMORPHONE HYDROCHLORIDE 0.5 MG: 1 INJECTION, SOLUTION INTRAMUSCULAR; INTRAVENOUS; SUBCUTANEOUS at 13:40

## 2023-04-26 RX ADMIN — ROCURONIUM BROMIDE 30 MG: 10 INJECTION, SOLUTION INTRAVENOUS at 11:15

## 2023-04-26 RX ADMIN — HYDROMORPHONE HYDROCHLORIDE 0.5 MG: 1 INJECTION, SOLUTION INTRAMUSCULAR; INTRAVENOUS; SUBCUTANEOUS at 13:27

## 2023-04-26 RX ADMIN — CEFAZOLIN SODIUM 2 G: 2 INJECTION, SOLUTION INTRAVENOUS at 17:57

## 2023-04-26 NOTE — ANESTHESIA POSTPROCEDURE EVALUATION
Patient: Kaia Mack    Procedure Summary     Date: 04/26/23 Room / Location: [facility] OR [address] [place] [address] MAIN OR    Anesthesia Start: 0949 Anesthesia Stop: 1402    Procedure: Lumbar 3 to lumbar 4 laminectomy with fusion and instrumentation using the Mazor robot and removal of previous instrumentation (Spine Lumbar) Diagnosis:       Chronic bilateral low back pain with bilateral sciatica      (Chronic bilateral low back pain with bilateral sciatica [M54.42, M54.41, G89.29])    Surgeons: Darrell Olvera MD Provider: Wilmer Hahn MD    Anesthesia Type: general ASA Status: 2          Anesthesia Type: general    Vitals  Vitals Value Taken Time   /63 04/26/23 1446   Temp 36.6 °C (97.9 °F) 04/26/23 1450   Pulse 69 04/26/23 1459   Resp 14 04/26/23 1445   SpO2 99 % 04/26/23 1459   Vitals shown include unvalidated device data.        Post Anesthesia Care and Evaluation    Patient location during evaluation: bedside  Patient participation: complete - patient participated  Level of consciousness: awake  Pain management: adequate    Airway patency: patent  Anesthetic complications: No anesthetic complications    Cardiovascular status: acceptable  Respiratory status: acceptable  Hydration status: acceptable    Comments: */63   Pulse 70   Temp 36.6 °C (97.9 °F) (Oral)   Resp 14   LMP 04/13/2023 (Exact Date)   SpO2 98%

## 2023-04-26 NOTE — OP NOTE
Preoperative diagnosis: Lumbar stenosis L3-4 with neurogenic claudication    Postoperative diagnosis: Same    Procedure performed: An L3-4 laminectomy, neurolysis and facetectomy with a posterior lumbar interbody fusion and instrumentation and removal of previous instrumentation at L4-5.    Spinal Surgery Levels Completed:1 Level     Surgeon: Darrell Olvera M.D.    Asst.: Treasure Cisneros CFA who was instrumental in helping with hemostasis, visualization of neural structures and retraction of neural structures.  Her skilled assistance was necessary for the success of this case    Estimated blood loss, crystalloid, colloid, blood: Please see anesthesia record    Material to lab:   None    Drains: None    Complications: None    Indications for the procedure: This is a patient with a significant amount of pain in her back with radiation into her legs.  She had had previous imaging showing severe stenosis at L3-4 which is the level above her previous surgery at L4-5.  She had not elected to proceed with surgery.    Operative summary:  The patient was brought into the operating room and placed under general endotracheal anesthesia using intravenous and inhalational agents.  The patient was then positioned on the operating table in the prone position.  All pressure points were padded including peripheral points of entrapment.  The back was prepped with ChloraPrep and then draped with Ioban, towels, half sheets and a lap sheet.  An incision was made over the left posterior iliac crest and a Touhy needle was placed into the marrow cavity and 15 cc of marrow were aspirated and placed over master graft.  Following this a posterior securing the Pintleyor robot was placed into that region.  Following this an incision was made in the midline.  This was carried down to the thoracolumbar fascia which was opened in the midline.  Muscles stripped off spinous processes and lamina arches at L3.  L4 had previously been removed.  Following  this scar tissue was peeled back off of the dura but there was an area of CSF leak over the left side.  This was repaired initially with Prolene but did not seem to hold very well and so I sewed a patch over it and ultimately put Adherus over at the end.    The muscles were stripped out laterally until the previous instrumentation was exposed.  The caps from the screws were removed and the rods were removed.  I then was able to use a distraction instrument to see if there was any evidence of movement and the fusion appeared to be solid.  The patient was then registered with the Oxynade robot and screws were placed at the L3 level on each side.  Once this was done the remainder of the operation was done under high-power magnification of the operating microscope using microsurgical technique and microsurgical instrumentation.  The L3 laminar arch was thinned down until it was paperthin and then cracked directly back off the dura.  Once this was done the lateral recess was opened up to the level of the medial L3 and L4 pedicles and the area between the 2 pedicles was opened as well.  On the right side it was undercut on the left side the facet was removed completely.  Once this was done the thecal sac and nerve root was mobilized medially.  It was necessary to remove scar tissue from around the L4 nerve roots on both sides in order to mobilize them.  The disc base was then incised and evin were placed into the disc space.  The space was then prepared and then expandable F cages were placed into the disc space on each side.  There were expanded is much as possible in order to help open the disc space and then master graft and marrow were injected into the disc space.  Once this was done bleeding was controlled with bipolar cautery thrombin Gelfoam and Floseal.  A 30 mm alexsandra on the left and a 40 mm alexsandra on the right were then attached to the screws using the previous caps on the L4 screws from the previous  instrumentation.  New screw caps were placed on the L3 screws.  Once this was done the area was irrigated copiously with Physiosol and antibiotic solution.  There was still some CSF leak from the primarily repaired rent in the dura from removing the epidural fibrosis and this was then passed with a piece of fascia from the incision itself.  This was sewn into place using a 5-0 Prolene and then coated with Adherus.  Following this the incision was closed in layers, dressed and the patient was taken to the recovery room in stable condition.  There were no apparent complications and the sponge, instrument and needle counts were correct at the end of the procedure.

## 2023-04-26 NOTE — BRIEF OP NOTE
LUMBAR FUSION POSTERIOR AND INSTRUMENTATION WITH NEURO ROBOT  Progress Note    Kaia Mack  4/26/2023    Pre-op Diagnosis:   Chronic bilateral low back pain with bilateral sciatica [M54.42, M54.41, G89.29]       Post-Op Diagnosis Codes:     * Chronic bilateral low back pain with bilateral sciatica [M54.42, M54.41, G89.29]    Procedure/CPT® Codes:        Procedure(s):  Lumbar 3 to lumbar 4 laminectomy with fusion and instrumentation using the Mazor robot and removal of previous instrumentation        Surgeon(s):  Darrell Olvera MD    Anesthesia: General    Staff:   Cell Saver : Elsy Aguilar  Circulator: Danyell Mccrary RN; Rajinder Soares RN  Radiology Technologist: Elsy Vera  Scrub Person: Nova Melendez; Madeleine Price  Vendor Representative: Zandra De Leon Ben H  Assistant: Treasure Cisneros CSA  Orientee: Trish Zuleta RN  Assistant: Treasure Cisneros CSA      Estimated Blood Loss: 250 mL    Urine Voided: 415 mL    Specimens:                None          Drains:   Urethral Catheter Silicone 16 Fr. (Active)       Findings: Severe stenosis        Complications: None      Darrell Olvera MD     Date: 4/26/2023  Time: 13:43 EDT

## 2023-04-26 NOTE — ANESTHESIA PREPROCEDURE EVALUATION
Anesthesia Evaluation     Patient summary reviewed and Nursing notes reviewed   history of anesthetic complications: PONV  NPO Solid Status: > 8 hours  NPO Liquid Status: > 4 hours           Airway   Mallampati: I  Dental - normal exam     Pulmonary - normal exam   (+) asthma,  Cardiovascular - negative cardio ROS and normal exam        Neuro/Psych  (+) headaches, numbness, psychiatric history Anxiety and Depression,    GI/Hepatic/Renal/Endo    (+)   renal disease stones, thyroid problem hypothyroidism    Musculoskeletal     Abdominal    Substance History      OB/GYN          Other                        Anesthesia Plan    ASA 2     general     intravenous induction     Anesthetic plan, risks, benefits, and alternatives have been provided, discussed and informed consent has been obtained with: patient.        CODE STATUS:

## 2023-04-26 NOTE — ANESTHESIA PROCEDURE NOTES
Airway  Urgency: elective    Date/Time: 4/26/2023 10:01 AM  Airway not difficult    General Information and Staff    Patient location during procedure: OR  Anesthesiologist: Wilmer Hahn MD  CRNA/CAA: Jayde Motley CRNA    Indications and Patient Condition  Indications for airway management: airway protection    Preoxygenated: yes  Mask difficulty assessment: 1 - vent by mask    Final Airway Details  Final airway type: endotracheal airway      Successful airway: ETT  Cuffed: yes   Successful intubation technique: direct laryngoscopy  Facilitating devices/methods: intubating stylet and cricoid pressure  Endotracheal tube insertion site: oral  Blade: Camacho  Blade size: 2  ETT size (mm): 7.0  Cormack-Lehane Classification: grade I - full view of glottis  Placement verified by: chest auscultation and capnometry   Cuff volume (mL): 7  Measured from: lips  ETT/EBT  to lips (cm): 21  Number of attempts at approach: 1  Assessment: lips, teeth, and gum same as pre-op    Additional Comments  EBBS: ETCO2+: Atraumatic intubation

## 2023-04-27 LAB
BASOPHILS # BLD AUTO: 0.03 10*3/MM3 (ref 0–0.2)
BASOPHILS NFR BLD AUTO: 0.4 % (ref 0–1.5)
DEPRECATED RDW RBC AUTO: 37.1 FL (ref 37–54)
EOSINOPHIL # BLD AUTO: 0 10*3/MM3 (ref 0–0.4)
EOSINOPHIL NFR BLD AUTO: 0 % (ref 0.3–6.2)
ERYTHROCYTE [DISTWIDTH] IN BLOOD BY AUTOMATED COUNT: 10.7 % (ref 12.3–15.4)
HCT VFR BLD AUTO: 33.4 % (ref 34–46.6)
HGB BLD-MCNC: 11.6 G/DL (ref 12–15.9)
IMM GRANULOCYTES # BLD AUTO: 0.03 10*3/MM3 (ref 0–0.05)
IMM GRANULOCYTES NFR BLD AUTO: 0.4 % (ref 0–0.5)
LYMPHOCYTES # BLD AUTO: 1.04 10*3/MM3 (ref 0.7–3.1)
LYMPHOCYTES NFR BLD AUTO: 12.3 % (ref 19.6–45.3)
MCH RBC QN AUTO: 32.2 PG (ref 26.6–33)
MCHC RBC AUTO-ENTMCNC: 34.7 G/DL (ref 31.5–35.7)
MCV RBC AUTO: 92.8 FL (ref 79–97)
MONOCYTES # BLD AUTO: 0.83 10*3/MM3 (ref 0.1–0.9)
MONOCYTES NFR BLD AUTO: 9.8 % (ref 5–12)
NEUTROPHILS NFR BLD AUTO: 6.52 10*3/MM3 (ref 1.7–7)
NEUTROPHILS NFR BLD AUTO: 77.1 % (ref 42.7–76)
NRBC BLD AUTO-RTO: 0 /100 WBC (ref 0–0.2)
PLATELET # BLD AUTO: 150 10*3/MM3 (ref 140–450)
PMV BLD AUTO: 10.5 FL (ref 6–12)
RBC # BLD AUTO: 3.6 10*6/MM3 (ref 3.77–5.28)
WBC NRBC COR # BLD: 8.45 10*3/MM3 (ref 3.4–10.8)

## 2023-04-27 PROCEDURE — 99024 POSTOP FOLLOW-UP VISIT: CPT | Performed by: NURSE PRACTITIONER

## 2023-04-27 PROCEDURE — 85025 COMPLETE CBC W/AUTO DIFF WBC: CPT | Performed by: NEUROLOGICAL SURGERY

## 2023-04-27 PROCEDURE — 63710000001 ONDANSETRON PER 8 MG: Performed by: NEUROLOGICAL SURGERY

## 2023-04-27 PROCEDURE — 25010000002 MORPHINE PER 10 MG: Performed by: NEUROLOGICAL SURGERY

## 2023-04-27 PROCEDURE — 25010000002 CEFAZOLIN IN DEXTROSE 2-4 GM/100ML-% SOLUTION: Performed by: NEUROLOGICAL SURGERY

## 2023-04-27 PROCEDURE — 25010000002 ONDANSETRON PER 1 MG: Performed by: NEUROLOGICAL SURGERY

## 2023-04-27 PROCEDURE — 25010000002 SODIUM CHLORIDE 0.9 % WITH KCL 20 MEQ 20-0.9 MEQ/L-% SOLUTION: Performed by: NEUROLOGICAL SURGERY

## 2023-04-27 RX ORDER — DOCUSATE SODIUM 100 MG/1
100 CAPSULE, LIQUID FILLED ORAL DAILY
Status: DISCONTINUED | OUTPATIENT
Start: 2023-04-27 | End: 2023-04-29 | Stop reason: HOSPADM

## 2023-04-27 RX ORDER — AMOXICILLIN 250 MG
2 CAPSULE ORAL NIGHTLY
Status: DISCONTINUED | OUTPATIENT
Start: 2023-04-27 | End: 2023-04-29 | Stop reason: HOSPADM

## 2023-04-27 RX ORDER — METHOCARBAMOL 500 MG/1
500 TABLET, FILM COATED ORAL EVERY 6 HOURS PRN
Status: DISCONTINUED | OUTPATIENT
Start: 2023-04-27 | End: 2023-04-29 | Stop reason: HOSPADM

## 2023-04-27 RX ADMIN — MORPHINE SULFATE 2 MG: 2 INJECTION, SOLUTION INTRAMUSCULAR; INTRAVENOUS at 19:29

## 2023-04-27 RX ADMIN — DOCUSATE SODIUM 50MG AND SENNOSIDES 8.6MG 2 TABLET: 8.6; 5 TABLET, FILM COATED ORAL at 21:35

## 2023-04-27 RX ADMIN — Medication 3 ML: at 21:29

## 2023-04-27 RX ADMIN — ONDANSETRON HYDROCHLORIDE 4 MG: 4 TABLET, FILM COATED ORAL at 22:35

## 2023-04-27 RX ADMIN — ONDANSETRON 4 MG: 2 INJECTION INTRAMUSCULAR; INTRAVENOUS at 02:36

## 2023-04-27 RX ADMIN — ONDANSETRON 4 MG: 2 INJECTION INTRAMUSCULAR; INTRAVENOUS at 08:34

## 2023-04-27 RX ADMIN — METHOCARBAMOL TABLETS 500 MG: 500 TABLET, COATED ORAL at 11:43

## 2023-04-27 RX ADMIN — HYDROCODONE BITARTRATE AND ACETAMINOPHEN 1 TABLET: 5; 325 TABLET ORAL at 18:28

## 2023-04-27 RX ADMIN — DOCUSATE SODIUM 100 MG: 100 CAPSULE, LIQUID FILLED ORAL at 11:43

## 2023-04-27 RX ADMIN — ONDANSETRON 4 MG: 2 INJECTION INTRAMUSCULAR; INTRAVENOUS at 14:04

## 2023-04-27 RX ADMIN — METHOCARBAMOL TABLETS 500 MG: 500 TABLET, COATED ORAL at 18:28

## 2023-04-27 RX ADMIN — MORPHINE SULFATE 2 MG: 2 INJECTION, SOLUTION INTRAMUSCULAR; INTRAVENOUS at 06:37

## 2023-04-27 RX ADMIN — HYDROCODONE BITARTRATE AND ACETAMINOPHEN 1 TABLET: 5; 325 TABLET ORAL at 22:35

## 2023-04-27 RX ADMIN — CITALOPRAM 20 MG: 20 TABLET, FILM COATED ORAL at 08:34

## 2023-04-27 RX ADMIN — CEFAZOLIN SODIUM 2 G: 2 INJECTION, SOLUTION INTRAVENOUS at 01:49

## 2023-04-27 RX ADMIN — MORPHINE SULFATE 2 MG: 2 INJECTION, SOLUTION INTRAMUSCULAR; INTRAVENOUS at 02:36

## 2023-04-27 RX ADMIN — Medication 3 ML: at 08:36

## 2023-04-27 RX ADMIN — HYDROCODONE BITARTRATE AND ACETAMINOPHEN 1 TABLET: 5; 325 TABLET ORAL at 14:04

## 2023-04-27 RX ADMIN — LEVOTHYROXINE SODIUM 88 MCG: 0.11 TABLET ORAL at 05:55

## 2023-04-27 RX ADMIN — POTASSIUM CHLORIDE AND SODIUM CHLORIDE 100 ML/HR: 900; 150 INJECTION, SOLUTION INTRAVENOUS at 16:08

## 2023-04-27 RX ADMIN — POTASSIUM CHLORIDE AND SODIUM CHLORIDE 100 ML/HR: 900; 150 INJECTION, SOLUTION INTRAVENOUS at 05:56

## 2023-04-27 RX ADMIN — HYDROCODONE BITARTRATE AND ACETAMINOPHEN 1 TABLET: 5; 325 TABLET ORAL at 08:34

## 2023-04-27 RX ADMIN — HYDROCODONE BITARTRATE AND ACETAMINOPHEN 1 TABLET: 5; 325 TABLET ORAL at 01:51

## 2023-04-27 NOTE — PLAN OF CARE
Goal Outcome Evaluation:  Plan of Care Reviewed With: patient        Progress: no change  Outcome Evaluation: Pt remains stable. On bedrest with HOB flat. Repositioning with minimal assistance. Dressing is cdi to lumbar spine. Fluids infusing. Pain well controlled. Morphine given for breakthrough pain. Zofran given for emesis after Norco given. FC in place. Remains on 2L O2. DC plan pending.

## 2023-04-27 NOTE — PROGRESS NOTES
[facility] THORACIC/LUMBAR NEUROSURGERY POSTOP NOTE      CC: POD 1 L3/4 lami/fusion with Mazor and removal of old hardware L4/5. +CSF leak-patched.       Subjective     Interval History: mild HA, difficult night with vomiting and back pain. No leg pain.       Objective     Vital signs in last 24 hours:  Temp:  [97.3 °F (36.3 °C)-98.3 °F (36.8 °C)] 98.3 °F (36.8 °C)  Heart Rate:  [50-79] 50  Resp:  [10-16] 16  BP: ()/(55-71) 112/68    Intake/Output this shift:  No intake/output data recorded.    LABS:  Results from last 7 days   Lab Units 04/27/23  0454   WBC 10*3/mm3 8.45   HEMOGLOBIN g/dL 11.6*   HEMATOCRIT % 33.4*   PLATELETS 10*3/mm3 150     IMAGING STUDIES:  No new imaging    I personally viewed and interpreted the patient's chart.    Meds reviewed/changed: Yes  Norco 5 mg p.o. every 4 as needed-3 doses  Scopolamine patch  Morphine 2 mg IV every 4 as needed-4 doses  Zofran 4 mg p.o. every 6 hours as needed-3 doses      Physical Exam:    General:   Awake, alert.  Left side lying in bed.  No acute distress.  Back:     Incision midline lumbar with CDI dressing.  Ice pack in place.  Abdomen:      Motor:   Normal muscle strength, bulk and tone in lower extremities.  No fasciculations, rigidity, spasticity, or abnormal movements.  Sensation: Normal to light touch brendon LEs  Station and Gait:             Bedrest  Extremities:   Wearing SCD      Assessment & Plan     ASSESSMENT:      Chronic bilateral low back pain with bilateral sciatica    Lumbar spinal stenosis    Patient postop day 1 from removal of old instrumentation and L3/4 decompression with instrumented fusion.  Had intraoperative CSF leak that was repaired.  She does report mild headache but is most bothered by vomiting over night that likely caused by pain medications.  Zofran helps some and she does have a scopolamine patch.  She has no leg pain.  Strength is good and incision site looks okay.  Will add muscle relaxant to hopefully decrease narcotic use.   "Keep head of bed flat and Martin today with bed rest.  Will need x-rays once mobile.    PLAN:  Daily CBC  HOB flat  Bed rest  Keep Martin  Add Robaxin  Encourage I-S  Encourage ice  Will need PT and x-rays once activity release    I discussed the patient's findings and my recommendations with patient, family and Dr. Barton    During patient visit, I utilized appropriate personal protective equipment including  gloves.  Appropriate PPE was worn during the entire visit.  Hand hygiene was completed before and after.      LOS: 0 days       Mara Suarez, APRN  4/27/2023  09:50 EDT    \"Dictated utilizing Dragon dictation\".      "

## 2023-04-28 ENCOUNTER — APPOINTMENT (OUTPATIENT)
Dept: GENERAL RADIOLOGY | Facility: HOSPITAL | Age: 48
End: 2023-04-28
Payer: COMMERCIAL

## 2023-04-28 LAB
DEPRECATED RDW RBC AUTO: 37.2 FL (ref 37–54)
ERYTHROCYTE [DISTWIDTH] IN BLOOD BY AUTOMATED COUNT: 11.1 % (ref 12.3–15.4)
HCT VFR BLD AUTO: 30.9 % (ref 34–46.6)
HGB BLD-MCNC: 10.5 G/DL (ref 12–15.9)
MCH RBC QN AUTO: 31.8 PG (ref 26.6–33)
MCHC RBC AUTO-ENTMCNC: 34 G/DL (ref 31.5–35.7)
MCV RBC AUTO: 93.6 FL (ref 79–97)
PLATELET # BLD AUTO: 137 10*3/MM3 (ref 140–450)
PMV BLD AUTO: 10.6 FL (ref 6–12)
RBC # BLD AUTO: 3.3 10*6/MM3 (ref 3.77–5.28)
WBC NRBC COR # BLD: 6.6 10*3/MM3 (ref 3.4–10.8)

## 2023-04-28 PROCEDURE — 97162 PT EVAL MOD COMPLEX 30 MIN: CPT

## 2023-04-28 PROCEDURE — 97530 THERAPEUTIC ACTIVITIES: CPT

## 2023-04-28 PROCEDURE — 85027 COMPLETE CBC AUTOMATED: CPT | Performed by: NURSE PRACTITIONER

## 2023-04-28 PROCEDURE — 63710000001 ONDANSETRON PER 8 MG: Performed by: NEUROLOGICAL SURGERY

## 2023-04-28 PROCEDURE — 72100 X-RAY EXAM L-S SPINE 2/3 VWS: CPT

## 2023-04-28 PROCEDURE — 99024 POSTOP FOLLOW-UP VISIT: CPT | Performed by: NURSE PRACTITIONER

## 2023-04-28 PROCEDURE — 25010000002 SODIUM CHLORIDE 0.9 % WITH KCL 20 MEQ 20-0.9 MEQ/L-% SOLUTION: Performed by: NEUROLOGICAL SURGERY

## 2023-04-28 RX ADMIN — METHOCARBAMOL TABLETS 500 MG: 500 TABLET, COATED ORAL at 23:29

## 2023-04-28 RX ADMIN — DOCUSATE SODIUM 100 MG: 100 CAPSULE, LIQUID FILLED ORAL at 08:24

## 2023-04-28 RX ADMIN — POTASSIUM CHLORIDE AND SODIUM CHLORIDE 100 ML/HR: 900; 150 INJECTION, SOLUTION INTRAVENOUS at 01:37

## 2023-04-28 RX ADMIN — CITALOPRAM 20 MG: 20 TABLET, FILM COATED ORAL at 08:24

## 2023-04-28 RX ADMIN — LEVOTHYROXINE SODIUM 88 MCG: 0.11 TABLET ORAL at 06:29

## 2023-04-28 RX ADMIN — METHOCARBAMOL TABLETS 500 MG: 500 TABLET, COATED ORAL at 17:01

## 2023-04-28 RX ADMIN — Medication 3 ML: at 22:29

## 2023-04-28 RX ADMIN — HYDROCODONE BITARTRATE AND ACETAMINOPHEN 1 TABLET: 5; 325 TABLET ORAL at 23:29

## 2023-04-28 RX ADMIN — HYDROCODONE BITARTRATE AND ACETAMINOPHEN 1 TABLET: 5; 325 TABLET ORAL at 02:34

## 2023-04-28 RX ADMIN — HYDROCODONE BITARTRATE AND ACETAMINOPHEN 1 TABLET: 5; 325 TABLET ORAL at 15:25

## 2023-04-28 RX ADMIN — HYDROCODONE BITARTRATE AND ACETAMINOPHEN 1 TABLET: 5; 325 TABLET ORAL at 19:16

## 2023-04-28 RX ADMIN — ONDANSETRON HYDROCHLORIDE 4 MG: 4 TABLET, FILM COATED ORAL at 23:29

## 2023-04-28 RX ADMIN — ONDANSETRON HYDROCHLORIDE 4 MG: 4 TABLET, FILM COATED ORAL at 15:25

## 2023-04-28 RX ADMIN — HYDROCODONE BITARTRATE AND ACETAMINOPHEN 1 TABLET: 5; 325 TABLET ORAL at 10:42

## 2023-04-28 RX ADMIN — METHOCARBAMOL TABLETS 500 MG: 500 TABLET, COATED ORAL at 10:02

## 2023-04-28 RX ADMIN — METHOCARBAMOL TABLETS 500 MG: 500 TABLET, COATED ORAL at 02:34

## 2023-04-28 RX ADMIN — DOCUSATE SODIUM 50MG AND SENNOSIDES 8.6MG 2 TABLET: 8.6; 5 TABLET, FILM COATED ORAL at 22:28

## 2023-04-28 RX ADMIN — ONDANSETRON HYDROCHLORIDE 4 MG: 4 TABLET, FILM COATED ORAL at 06:29

## 2023-04-28 RX ADMIN — HYDROCODONE BITARTRATE AND ACETAMINOPHEN 1 TABLET: 5; 325 TABLET ORAL at 06:29

## 2023-04-28 NOTE — PLAN OF CARE
Goal Outcome Evaluation:  Plan of Care Reviewed With: patient        Progress: no change  Outcome Evaluation: Pt remains stable. Hoping bedrest restriction is lifted today, HOB kept flat and pt is turning often. Norco and robaxin given for pain, pt does c/o mild headache intermittently. Morphine given for breakthrough pain PRN. Fluids infusing, BP soft but no symptoms. Dressing is cdi. Zofran PO given for prevent nausea. DC plan pending progress.

## 2023-04-28 NOTE — PROGRESS NOTES
[facility] THORACIC/LUMBAR NEUROSURGERY PROGRESS NOTE      CC:POD 2-L3/4 lami/fusion with Mazor and removal of old hardware L4/5. +CSF leak-patched.       Subjective     Interval History: Headache improved, mild back pain throughout the night but improving.  No leg pain, nausea or vomiting.    ROS:  Constitutional: No fever, chills  MS:  back pain  Neuro: No numbness, tingling, or weakness  : Martin Cath    Objective     Vital signs in last 24 hours:  Temp:  [97.8 °F (36.6 °C)-99 °F (37.2 °C)] 99 °F (37.2 °C)  Heart Rate:  [50-53] 50  Resp:  [16] 16  BP: ()/(46-65) 105/58    Intake/Output this shift:  I/O this shift:  In: 240 [P.O.:240]  Out: 950 [Urine:950]    LABS:  Results from last 7 days   Lab Units 04/28/23  0511 04/27/23  0454   WBC 10*3/mm3 6.60 8.45   HEMOGLOBIN g/dL 10.5* 11.6*   HEMATOCRIT % 30.9* 33.4*   PLATELETS 10*3/mm3 137* 150             IMAGING STUDIES:  No new imaging    I personally viewed and interpreted the patient's chart.    Meds reviewed/changed: Yes  Norco 5mg q 4hr po prn-5 doses  Robaxin 500 mg every 6 hours p.o. prn-3 doses    Physical Exam:    General:   Awake, alert.  Lying on right side , head of bed flat   Back:    Mid lumbar incision well approximated, dressing clean dry and intact. Ice pack in place  Abdomen:   Normal bowel sounds, soft, nontender  Motor: Normal muscle strength, bulk and tone in lower extremities.    Sensation: Normal to light touch brendon LEs  Station and Gait:           Bedrest  Extremities:   No calf tenderness bilaterally, Wearing SCD      Assessment & Plan     ASSESSMENT:      Chronic bilateral low back pain with bilateral sciatica    Lumbar spinal stenosis    Patient postop day 2 from removal of old instrumentation of L4/5 and L3/4 decompression and instrumented fusion.  Patient did have small intraoperative CSF leak that was repaired.  Patient has been lying flat since surgery.  She she admits to headache but has resolved with caffeine.  No nausea and  "vomiting.  Does feel bloated but is passing flatus.  Stable exam today.  Plan to begin slowly sitting her up today including up to chair and working with physical therapy.  We will also plan to discharge Martin catheter once patient is up.  Once patient is mobile will need to get lumbar x-rays.  She is patient has had low blood pressure readings, states that is quite common for her, she is asymptomatic.    PLAN:   Begin activity  DC Martin once up  PT to see and evaluate  Lumbar x-rays once up  Plan for discharge tomorrow if patient tolerates activity today    I discussed the patient's findings and my recommendations with patient, family, nursing staff and Dr Olvera    During patient visit, I utilized appropriate personal protective equipment including gloves.  Mask used was standard procedure mask. Appropriate PPE was worn during the entire visit.  Hand hygiene was completed before and after.      LOS: 0 days       Heather Jin, APRN  4/28/2023  10:01 EDT    \"Dictated utilizing Dragon dictation\".      "

## 2023-04-28 NOTE — THERAPY EVALUATION
Patient Name: Kaia Mack  : 1975    MRN: [id number]                              Today's Date: 2023       Admit Date: 2023    Visit Dx: No diagnosis found.  Patient Active Problem List   Diagnosis   • Insomnia   • Iron deficiency anemia   • Vitamin D deficiency   • Hypothyroidism   • Pituitary adenoma   • Other mixed anxiety disorders   • Chronic allergic rhinitis   • TMJ (temporomandibular joint disorder)   • Other irritable bowel syndrome   • Screening for colon cancer   • Colon polyps   • Chronic bilateral low back pain with bilateral sciatica   • Spinal stenosis of lumbar region with neurogenic claudication   • Lumbar spinal stenosis     Past Medical History:   Diagnosis Date   • Acute appendicitis with localized peritonitis 2017   • Anxiety    • Asthma    • Colon polyp    • Depression    • History of COVID-2020   • Hypothyroidism    • Kidney stone    • Lumbar canal stenosis 2016   • Migraine     RARE   • Numbness and tingling of left leg    • Pituitary adenoma     benign   • PONV (postoperative nausea and vomiting)    • Sciatica    • Spinal stenosis    • Spondylolisthesis of lumbar region 2016     Past Surgical History:   Procedure Laterality Date   • ABDOMINOPLASTY N/A 2016    Dr. Isidro Nguyen   • APPENDECTOMY N/A 2017    Procedure: APPENDECTOMY LAPAROSCOPIC;  Surgeon: Leticia Elizabeth MD;  Location: [facility] OR;  Service:    • BREAST AUGMENTATION Bilateral 2016    Dr. Isidro Nguyen   • COLONOSCOPY N/A 2022    Procedure: COLONOSCOPY to cecum with biopsy and hot snare polypectomy;  Surgeon: Leticia Elizabeth MD;  Location: [facility] ENDOSCOPY;  Service: General;  Laterality: N/A;  pre- screening  post- polyps   • DENTAL EXAMINATION UNDER ANESTHESIA     • LUMBAR DISCECTOMY FUSION INSTRUMENTATION N/A 2016    Procedure: L4-5  fusion with instrumentation;  Surgeon: Isidro Dietz MD;  Location: [facility] OR;  Service:    • LUMBAR FUSION  N/A 4/26/2023    Procedure: Lumbar 3 to lumbar 4 laminectomy with fusion and instrumentation using the Tailored Fitor robot and removal of previous instrumentation;  Surgeon: Darrell Olvera MD;  Location: [facility] OR;  Service: Robotics - Neuro;  Laterality: N/A;   • LUMBAR LAMINECTOMY DISCECTOMY DECOMPRESSION N/A 9/26/2016    Procedure: L4-L5 LUMBAR LAMINECTOMY;  Surgeon: Darrell Olvera MD;  Location: [facility] MAIN OR;  Service:    • SCAR REVISION BREAST Bilateral 4/29/2016    Procedure: REVISION GABBY BREAST, REMOVE AND REPLACE RIGHT BREAST IMPLANT;  Surgeon: Isidro Nguyen MD;  Location: [facility] MAIN OR;  Service:    • WISDOM TOOTH EXTRACTION Bilateral       General Information     Row Name 04/28/23 1333          Physical Therapy Time and Intention    Document Type evaluation  -DB     Mode of Treatment individual therapy;physical therapy  -DB     Row Name 04/28/23 1333          General Information    Patient Profile Reviewed yes  -DB     Prior Level of Function independent:  -DB     Existing Precautions/Restrictions fall;spinal  -DB     Barriers to Rehab none identified  -DB     Row Name 04/28/23 1333          Living Environment    People in Home spouse;child(karoline), dependent  -DB     Row Name 04/28/23 1333          Home Main Entrance    Number of Stairs, Main Entrance two  -DB     Stair Railings, Main Entrance railings safe and in good condition  -DB     Row Name 04/28/23 1333          Stairs Within Home, Primary    Number of Stairs, Within Home, Primary none  -DB     Row Name 04/28/23 1333          Cognition    Orientation Status (Cognition) oriented x 4  -DB     Row Name 04/28/23 1333          Safety Issues, Functional Mobility    Impairments Affecting Function (Mobility) pain;strength;balance  -DB           User Key  (r) = Recorded By, (t) = Taken By, (c) = Cosigned By    Initials Name Provider Type    DB Leticia Broussard PT Physical Therapist               Mobility     Row Name 04/28/23 1334          Bed  Mobility    Bed Mobility sit-supine  -DB     Sit-Supine [place] (Bed Mobility) supervision;verbal cues  -DB     Assistive Device (Bed Mobility) bed rails  -DB     Comment, (Bed Mobility) cues for log roll technique  -DB     Row Name 04/28/23 1334          Sit-Stand Transfer    Sit-Stand [place] (Transfers) supervision  -DB     Assistive Device (Sit-Stand Transfers) walker, front-wheeled  -DB     Row Name 04/28/23 1334          Gait/Stairs (Locomotion)    [place] Level (Gait) standby assist  -DB     Assistive Device (Gait) walker, front-wheeled  -DB     Distance in Feet (Gait) 400'  -DB     Deviations/Abnormal Patterns (Gait) gait speed decreased;stride length decreased  -DB     Bilateral Gait Deviations forward flexed posture  -DB           User Key  (r) = Recorded By, (t) = Taken By, (c) = Cosigned By    Initials Name Provider Type    DB Leticia Broussard, PT Physical Therapist               Obj/Interventions     Row Name 04/28/23 1335          Range of Motion Comprehensive    General Range of Motion no range of motion deficits identified  -DB     Row Name 04/28/23 1335          Strength Comprehensive (MMT)    Comment, General Manual Muscle Testing (MMT) Assessment generalized post op weakness  -DB     Row Name 04/28/23 1335          Balance    Balance Assessment sitting static balance;sitting dynamic balance;standing static balance;standing dynamic balance  -DB     Static Sitting Balance modified independence  -DB     Dynamic Sitting Balance modified independence  -DB     Position, Sitting Balance sitting edge of bed  -DB     Static Standing Balance standby assist  -DB     Dynamic Standing Balance standby assist  -DB     Position/Device Used, Standing Balance supported;walker, front-wheeled  -DB     Balance Interventions sitting;standing;sit to stand  -DB           User Key  (r) = Recorded By, (t) = Taken By, (c) = Cosigned By    Initials Name Provider Type    DB Leticia Broussard, MARICARMEN Physical  Therapist               Goals/Plan     Row Name 04/28/23 1341          Bed Mobility Goal 1 (PT)    Activity/Assistive Device (Bed Mobility Goal 1, PT) bed mobility activities, all  -DB     [place] Level/Cues Needed (Bed Mobility Goal 1, PT) independent  -DB     Time Frame (Bed Mobility Goal 1, PT) 1 week  -DB     Row Name 04/28/23 1342          Transfer Goal 1 (PT)    Activity/Assistive Device (Transfer Goal 1, PT) transfers, all  -DB     [place] Level/Cues Needed (Transfer Goal 1, PT) independent  -DB     Time Frame (Transfer Goal 1, PT) 1 week  -DB     Row Name 04/28/23 1342          Gait Training Goal 1 (PT)    Activity/Assistive Device (Gait Training Goal 1, PT) gait (walking locomotion)  -DB     [place] Level (Gait Training Goal 1, PT) independent  -DB     Time Frame (Gait Training Goal 1, PT) 1 week  -DB     Row Name 04/28/23 1343          Therapy Assessment/Plan (PT)    Planned Therapy Interventions (PT) balance training;bed mobility training;gait training;home exercise program;neuromuscular re-education;postural re-education;transfer training;strengthening;stair training;patient/family education;lumbar stabilization  -DB           User Key  (r) = Recorded By, (t) = Taken By, (c) = Cosigned By    Initials Name Provider Type    Leticia Marti, MARICARMEN Physical Therapist               Clinical Impression     Row Name 04/28/23 1436          Pain    Pretreatment Pain Rating 7/10  -DB     Posttreatment Pain Rating 7/10  -DB     Pain Location - back  -DB     Pain Intervention(s) Ambulation/increased activity;Repositioned  -DB     Row Name 04/28/23 2263          Plan of Care Review    Plan of Care Reviewed With patient;spouse  -DB     Outcome Evaluation Pt is a 49 y/o F POD2 L3/4 laminectomy and fusion. Pt lives with her  and children, has 2 TEO, no stairs she needs to use at home. She states she already has a RW ready for home use, although (I) at baseline. Today, she was SV for STS and bed  mobility, cues for log roll technique. She was able to ambulate 400' with RW and SBA, cues for upright posture during gait. Pt demo's dec'd strength and balance and would benefit from skilled PT intervention, but rec D/C home with family assist.  -DB     Row Name 04/28/23 1335          Therapy Assessment/Plan (PT)    Rehab Potential (PT) good, to achieve stated therapy goals  -DB     Criteria for Skilled Interventions Met (PT) yes  -DB     Therapy Frequency (PT) 6 times/wk  -DB     Row Name 04/28/23 1335          Vital Signs    O2 Delivery Pre Treatment room air  -DB     O2 Delivery Intra Treatment room air  -DB     O2 Delivery Post Treatment room air  -DB     Pre Patient Position Sitting  -DB     Intra Patient Position Standing  -DB     Post Patient Position Supine  -DB     Row Name 04/28/23 0784          Positioning and Restraints    Pre-Treatment Position sitting in chair/recliner  -DB     Post Treatment Position bed  -DB     In Bed supine;call light within reach;encouraged to call for assist;exit alarm on;patient within staff view  -DB           User Key  (r) = Recorded By, (t) = Taken By, (c) = Cosigned By    Initials Name Provider Type    DB Leticia Broussard, PT Physical Therapist               Outcome Measures     Row Name 04/28/23 1342 04/28/23 0824       How much help from another person do you currently need...    Turning from your back to your side while in flat bed without using bedrails? 4  -DB 4  -MN    Moving from lying on back to sitting on the side of a flat bed without bedrails? 4  -DB 1  bedrest  -MN    Moving to and from a bed to a chair (including a wheelchair)? 4  -DB 1  bedrest  -MN    Standing up from a chair using your arms (e.g., wheelchair, bedside chair)? 4  -DB 1  bedrest  -MN    Climbing 3-5 steps with a railing? 3  -DB 1  bedrest  -MN    To walk in hospital room? 3  -DB 1  bedrest  -MN    AM-PAC 6 Clicks Score (PT) 22  -DB 9  -MN    Highest level of mobility 7 --> Walked 25 feet or  more  -DB 3 --> Sat at edge of bed  -MN    Row Name 04/28/23 1342          Functional Assessment    Outcome Measure Options AM-PAC 6 Clicks Basic Mobility (PT)  -DB           User Key  (r) = Recorded By, (t) = Taken By, (c) = Cosigned By    Initials Name Provider Type    Anette Solomon RN Registered Nurse    Leticia Marti, PT Physical Therapist                             Physical Therapy Education     Title: PT OT SLP Therapies (Done)     Topic: Physical Therapy (Done)     Point: Mobility training (Done)     Learning Progress Summary           Patient Acceptance, E, VU by DB at 4/28/2023 1343   Significant Other Acceptance, E, VU by DB at 4/28/2023 1343                   Point: Home exercise program (Done)     Learning Progress Summary           Patient Acceptance, E, VU by DB at 4/28/2023 1343   Significant Other Acceptance, E, VU by DB at 4/28/2023 1343                   Point: Body mechanics (Done)     Learning Progress Summary           Patient Acceptance, E, VU by DB at 4/28/2023 1343   Significant Other Acceptance, E, VU by DB at 4/28/2023 1343                   Point: Precautions (Done)     Learning Progress Summary           Patient Acceptance, E, VU by DB at 4/28/2023 1343   Significant Other Acceptance, E, VU by DB at 4/28/2023 1343                               User Key     Initials Effective Dates Name Provider Type Discipline    DB 06/16/21 -  Leticia Broussard, MARICARMEN Physical Therapist PT              PT Recommendation and Plan  Planned Therapy Interventions (PT): balance training, bed mobility training, gait training, home exercise program, neuromuscular re-education, postural re-education, transfer training, strengthening, stair training, patient/family education, lumbar stabilization  Plan of Care Reviewed With: patient, spouse  Outcome Evaluation: Pt is a 47 y/o F POD2 L3/4 laminectomy and fusion. Pt lives with her  and children, has 2 TEO, no stairs she needs to use at home. She  states she already has a RW ready for home use, although (I) at baseline. Today, she was SV for STS and bed mobility, cues for log roll technique. She was able to ambulate 400' with RW and SBA, cues for upright posture during gait. Pt demo's dec'd strength and balance and would benefit from skilled PT intervention, but rec D/C home with family assist.     Time Calculation:    PT Charges     Row Name 04/28/23 1344             Time Calculation    Start Time 1315  -DB      Stop Time 1331  -DB      Time Calculation (min) 16 min  -DB      PT Received On 04/28/23  -DB      PT - Next Appointment 04/29/23  -DB      PT Goal Re-Cert Due Date 05/05/23  -DB         Time Calculation- PT    Total Timed Code Minutes- PT 8 minute(s)  -DB            User Key  (r) = Recorded By, (t) = Taken By, (c) = Cosigned By    Initials Name Provider Type    DB Leticia Broussard, PT Physical Therapist              Therapy Charges for Today     Code Description Service Date Service Provider Modifiers Qty    18233285314  PT EVAL MOD COMPLEXITY 3 4/28/2023 Leticia Broussard, PT GP 1    [id number]  PT THERAPEUTIC ACT EA 15 MIN 4/28/2023 Leticia Broussard, PT GP 1          PT G-Codes  Outcome Measure Options: AM-PAC 6 Clicks Basic Mobility (PT)  AM-PAC 6 Clicks Score (PT): 22  PT Discharge Summary  Anticipated Discharge Disposition (PT): home with assist    Leticia Broussard, MARICARMEN  4/28/2023

## 2023-04-28 NOTE — PLAN OF CARE
Goal Outcome Evaluation:  Plan of Care Reviewed With: patient        Progress: improving  Outcome Evaluation: vss, nvi, ambulating assist of 1 with walker, voiding per BRP, plan to dc home tomorrow, border dressing with small unchanged drainage

## 2023-04-28 NOTE — PLAN OF CARE
Goal Outcome Evaluation:  Plan of Care Reviewed With: patient, spouse           Outcome Evaluation: Pt is a 47 y/o F POD2 L3/4 laminectomy and fusion. Pt lives with her  and children, has 2 TEO, no stairs she needs to use at home. She states she already has a RW ready for home use, although (I) at baseline. Today, she was SV for STS and bed mobility, cues for log roll technique. She was able to ambulate 400' with RW and SBA, cues for upright posture during gait. Pt demo's dec'd strength and balance and would benefit from skilled PT intervention, but rec D/C home with family assist.

## 2023-04-29 VITALS
OXYGEN SATURATION: 98 % | SYSTOLIC BLOOD PRESSURE: 107 MMHG | DIASTOLIC BLOOD PRESSURE: 60 MMHG | WEIGHT: 118.3 LBS | TEMPERATURE: 98.2 F | HEART RATE: 52 BPM | HEIGHT: 64 IN | RESPIRATION RATE: 16 BRPM | BODY MASS INDEX: 20.2 KG/M2

## 2023-04-29 LAB
DEPRECATED RDW RBC AUTO: 36.6 FL (ref 37–54)
ERYTHROCYTE [DISTWIDTH] IN BLOOD BY AUTOMATED COUNT: 11 % (ref 12.3–15.4)
HCT VFR BLD AUTO: 32.6 % (ref 34–46.6)
HGB BLD-MCNC: 11.2 G/DL (ref 12–15.9)
MCH RBC QN AUTO: 32.2 PG (ref 26.6–33)
MCHC RBC AUTO-ENTMCNC: 34.4 G/DL (ref 31.5–35.7)
MCV RBC AUTO: 93.7 FL (ref 79–97)
PLATELET # BLD AUTO: 137 10*3/MM3 (ref 140–450)
PMV BLD AUTO: 11.1 FL (ref 6–12)
RBC # BLD AUTO: 3.48 10*6/MM3 (ref 3.77–5.28)
WBC NRBC COR # BLD: 6.04 10*3/MM3 (ref 3.4–10.8)

## 2023-04-29 PROCEDURE — 97530 THERAPEUTIC ACTIVITIES: CPT

## 2023-04-29 PROCEDURE — 63710000001 ONDANSETRON PER 8 MG: Performed by: NEUROLOGICAL SURGERY

## 2023-04-29 PROCEDURE — 99024 POSTOP FOLLOW-UP VISIT: CPT | Performed by: NURSE PRACTITIONER

## 2023-04-29 PROCEDURE — 85027 COMPLETE CBC AUTOMATED: CPT | Performed by: NURSE PRACTITIONER

## 2023-04-29 RX ORDER — METHOCARBAMOL 500 MG/1
500 TABLET, FILM COATED ORAL EVERY 6 HOURS PRN
Qty: 90 TABLET | Refills: 0 | Status: SHIPPED | OUTPATIENT
Start: 2023-04-29

## 2023-04-29 RX ORDER — DOCUSATE SODIUM 100 MG/1
100 CAPSULE, LIQUID FILLED ORAL 2 TIMES DAILY PRN
Qty: 20 CAPSULE | Refills: 0 | Status: SHIPPED | OUTPATIENT
Start: 2023-04-29

## 2023-04-29 RX ORDER — HYDROCODONE BITARTRATE AND ACETAMINOPHEN 5; 325 MG/1; MG/1
1 TABLET ORAL EVERY 4 HOURS PRN
Qty: 24 TABLET | Refills: 0 | Status: SHIPPED | OUTPATIENT
Start: 2023-04-29 | End: 2023-05-03

## 2023-04-29 RX ADMIN — CITALOPRAM 20 MG: 20 TABLET, FILM COATED ORAL at 08:51

## 2023-04-29 RX ADMIN — DOCUSATE SODIUM 100 MG: 100 CAPSULE, LIQUID FILLED ORAL at 08:51

## 2023-04-29 RX ADMIN — HYDROCODONE BITARTRATE AND ACETAMINOPHEN 1 TABLET: 5; 325 TABLET ORAL at 11:12

## 2023-04-29 RX ADMIN — LEVOTHYROXINE SODIUM 88 MCG: 0.11 TABLET ORAL at 05:48

## 2023-04-29 RX ADMIN — HYDROCODONE BITARTRATE AND ACETAMINOPHEN 1 TABLET: 5; 325 TABLET ORAL at 15:44

## 2023-04-29 RX ADMIN — METHOCARBAMOL TABLETS 500 MG: 500 TABLET, COATED ORAL at 06:44

## 2023-04-29 RX ADMIN — ONDANSETRON HYDROCHLORIDE 4 MG: 4 TABLET, FILM COATED ORAL at 05:48

## 2023-04-29 RX ADMIN — HYDROCODONE BITARTRATE AND ACETAMINOPHEN 1 TABLET: 5; 325 TABLET ORAL at 05:48

## 2023-04-29 RX ADMIN — Medication 3 ML: at 08:52

## 2023-04-29 NOTE — PLAN OF CARE
A/Ox4. No s/s distress. POD#3 L3/4 lami/fusion w/ Mazor and removal of old hardware L4/5. +CSF leak-patched. Assist x1 w/ walker. BRP. PRN Zofran given w/ Norco. PRN Robaxin for spasm. Plan to d/c home.

## 2023-04-29 NOTE — PLAN OF CARE
Goal Outcome Evaluation:  Plan of Care Reviewed With: patient           Outcome Evaluation: Patient seen for PT session this AM. Patient standing at EOB upon arrival. Patient agreeable to ambulate in hallway this AM. Patient ambulated 450ft around unit with rwx and SV. Gait slow but steady with no overt LOB noted. Patient appears to be mobilizing safely in room and around unit at this time. Acute PT will s/o. Encouraged patient to ambulate in mckay at least 3x/day.

## 2023-04-29 NOTE — THERAPY TREATMENT NOTE
Patient Name: Kaia Mack  : 1975    MRN: [id number]                              Today's Date: 2023       Admit Date: 2023    Visit Dx: No diagnosis found.  Patient Active Problem List   Diagnosis   • Insomnia   • Iron deficiency anemia   • Vitamin D deficiency   • Hypothyroidism   • Pituitary adenoma   • Other mixed anxiety disorders   • Chronic allergic rhinitis   • TMJ (temporomandibular joint disorder)   • Other irritable bowel syndrome   • Screening for colon cancer   • Colon polyps   • Chronic bilateral low back pain with bilateral sciatica   • Spinal stenosis of lumbar region with neurogenic claudication   • Lumbar spinal stenosis     Past Medical History:   Diagnosis Date   • Acute appendicitis with localized peritonitis 2017   • Anxiety    • Asthma    • Colon polyp    • Depression    • History of COVID-2020   • Hypothyroidism    • Kidney stone    • Lumbar canal stenosis 2016   • Migraine     RARE   • Numbness and tingling of left leg    • Pituitary adenoma     benign   • PONV (postoperative nausea and vomiting)    • Sciatica    • Spinal stenosis    • Spondylolisthesis of lumbar region 2016     Past Surgical History:   Procedure Laterality Date   • ABDOMINOPLASTY N/A 2016    Dr. Isidro Nguyen   • APPENDECTOMY N/A 2017    Procedure: APPENDECTOMY LAPAROSCOPIC;  Surgeon: Leticia Elizabeth MD;  Location: [facility] OR;  Service:    • BREAST AUGMENTATION Bilateral 2016    Dr. Isidro Nguyen   • COLONOSCOPY N/A 2022    Procedure: COLONOSCOPY to cecum with biopsy and hot snare polypectomy;  Surgeon: Leticia Elizabeth MD;  Location: [facility] ENDOSCOPY;  Service: General;  Laterality: N/A;  pre- screening  post- polyps   • DENTAL EXAMINATION UNDER ANESTHESIA     • LUMBAR DISCECTOMY FUSION INSTRUMENTATION N/A 2016    Procedure: L4-5  fusion with instrumentation;  Surgeon: Isidro Dietz MD;  Location: [facility] OR;  Service:    • LUMBAR FUSION  N/A 4/26/2023    Procedure: Lumbar 3 to lumbar 4 laminectomy with fusion and instrumentation using the Openbayor robot and removal of previous instrumentation;  Surgeon: Darrell Olvera MD;  Location: [facility];  Service: Robotics - Neuro;  Laterality: N/A;   • LUMBAR LAMINECTOMY DISCECTOMY DECOMPRESSION N/A 9/26/2016    Procedure: L4-L5 LUMBAR LAMINECTOMY;  Surgeon: Darrell Olvera MD;  Location: [facility] OR;  Service:    • SCAR REVISION BREAST Bilateral 4/29/2016    Procedure: REVISION GABBY BREAST, REMOVE AND REPLACE RIGHT BREAST IMPLANT;  Surgeon: Isidro Nguyen MD;  Location: [facility] OR;  Service:    • WISDOM TOOTH EXTRACTION Bilateral       General Information     Row Name 04/29/23 1211          Physical Therapy Time and Intention    Document Type therapy note (daily note);discharge treatment  -     Mode of Treatment individual therapy;physical therapy  -     Row Name 04/29/23 1211          General Information    Patient Profile Reviewed yes  -     Existing Precautions/Restrictions fall;spinal  -     Row Name 04/29/23 1211          Cognition    Orientation Status (Cognition) oriented x 4  -           User Key  (r) = Recorded By, (t) = Taken By, (c) = Cosigned By    Initials Name Provider Type     Karen Washington PT Physical Therapist               Mobility     Row Name 04/29/23 1211          Bed Mobility    Comment, (Bed Mobility) Patient standing in room upon arrival  -     Row Name 04/29/23 1211          Gait/Stairs (Locomotion)    [place] Level (Gait) supervision  -     Assistive Device (Gait) walker, front-wheeled  -     Distance in Feet (Gait) 450ft  -     Deviations/Abnormal Patterns (Gait) gait speed decreased  -     Comment, (Gait/Stairs) Gait slow but steady with no overt LOB noted.  -           User Key  (r) = Recorded By, (t) = Taken By, (c) = Cosigned By    Initials Name Provider Type    Karen Reed PT Physical Therapist                Obj/Interventions     Row Name 04/29/23 1211          Balance    Balance Assessment standing static balance;standing dynamic balance  -     Static Standing Balance supervision  -     Dynamic Standing Balance supervision  -     Position/Device Used, Standing Balance supported;walker, front-wheeled  -           User Key  (r) = Recorded By, (t) = Taken By, (c) = Cosigned By    Initials Name Provider Type    Karen Reed PT Physical Therapist               Goals/Plan    No documentation.                Clinical Impression     Row Name 04/29/23 1212          Pain    Pretreatment Pain Rating 3/10  -SM     Posttreatment Pain Rating 4/10  -     Pain Location - back  -     Pain Intervention(s) Ambulation/increased activity  -SM     Row Name 04/29/23 1212          Plan of Care Review    Plan of Care Reviewed With patient  -     Outcome Evaluation Patient seen for PT session this AM. Patient standing at EOB upon arrival. Patient agreeable to ambulate in hallway this AM. Patient ambulated 450ft around unit with rwx and SV. Gait slow but steady with no overt LOB noted. Patient appears to be mobilizing safely in room and around unit at this time. Acute PT will s/o. Encouraged patient to ambulate in mckay at least 3x/day.  -[facility] Name 04/29/23 1212          Therapy Assessment/Plan (PT)    Criteria for Skilled Interventions Met (PT) no problems identified which require skilled intervention  -SM     Row Name 04/29/23 1212          Vital Signs    Pre Patient Position Standing  -     Intra Patient Position Standing  -     Post Patient Position Sitting  -SM     Row Name 04/29/23 1212          Positioning and Restraints    Pre-Treatment Position standing in room  -SM     Post Treatment Position bed  -SM     In Bed sitting EOB  -           User Key  (r) = Recorded By, (t) = Taken By, (c) = Cosigned By    Initials Name Provider Type    Karen Reed PT Physical Therapist               Outcome  Measures     Row Name 04/29/23 1214 04/29/23 0851       How much help from another person do you currently need...    Turning from your back to your side while in flat bed without using bedrails? 4  -SM 4  -AZAM    Moving from lying on back to sitting on the side of a flat bed without bedrails? 4  -SM 4  -AZAM    Moving to and from a bed to a chair (including a wheelchair)? 4  -SM 4  -AZAM    Standing up from a chair using your arms (e.g., wheelchair, bedside chair)? 4  -SM 4  -AZAM    Climbing 3-5 steps with a railing? 3  -SM 3  -AZAM    To walk in hospital room? 4  -SM 4  -AZAM    AM-PAC 6 Clicks Score (PT) 23  - 23  -AZAM    Highest level of mobility 7 --> Walked 25 feet or more  - 7 --> Walked 25 feet or more  -AZAM    Row Name 04/29/23 1214          Functional Assessment    Outcome Measure Options AM-PAC 6 Clicks Basic Mobility (PT)  -           User Key  (r) = Recorded By, (t) = Taken By, (c) = Cosigned By    Initials Name Provider Type    Karen Reed, PT Physical Therapist    Fahad Medina, RN Registered Nurse                             Physical Therapy Education     Title: PT OT SLP Therapies (Done)     Topic: Physical Therapy (Done)     Point: Mobility training (Done)     Learning Progress Summary           Patient Acceptance, E, VU by  at 4/29/2023 1214    Acceptance, E, VU by DB at 4/28/2023 1343   Significant Other Acceptance, E, VU by DB at 4/28/2023 1343                   Point: Home exercise program (Done)     Learning Progress Summary           Patient Acceptance, E, VU by DB at 4/28/2023 1343   Significant Other Acceptance, E, VU by DB at 4/28/2023 1343                   Point: Body mechanics (Done)     Learning Progress Summary           Patient Acceptance, E, VU by DB at 4/28/2023 1343   Significant Other Acceptance, E, VU by DB at 4/28/2023 1343                   Point: Precautions (Done)     Learning Progress Summary           Patient Acceptance, E, VU by  at 4/29/2023 1214     Acceptance, E, VU by DB at 4/28/2023 1343   Significant Other Acceptance, E, VU by DB at 4/28/2023 1343                               User Key     Initials Effective Dates Name Provider Type Discipline    DB 06/16/21 -  Leticia Broussard, PT Physical Therapist PT     05/02/22 -  Karen Washington PT Physical Therapist PT              PT Recommendation and Plan     Plan of Care Reviewed With: patient  Outcome Evaluation: Patient seen for PT session this AM. Patient standing at EOB upon arrival. Patient agreeable to ambulate in hallway this AM. Patient ambulated 450ft around unit with rwx and SV. Gait slow but steady with no overt LOB noted. Patient appears to be mobilizing safely in room and around unit at this time. Acute PT will s/o. Encouraged patient to ambulate in mckay at least 3x/day.     Time Calculation:    PT Charges     Row Name 04/29/23 1214             Time Calculation    Start Time 1146  -SM      Stop Time 1155  -SM      Time Calculation (min) 9 min  -SM      PT Received On 04/29/23  -SM         Time Calculation- PT    Total Timed Code Minutes- PT 9 minute(s)  -SM         Timed Charges    22436 - PT Therapeutic Activity Minutes 9  -SM         Total Minutes    Timed Charges Total Minutes 9  -SM       Total Minutes 9  -SM            User Key  (r) = Recorded By, (t) = Taken By, (c) = Cosigned By    Initials Name Provider Type     Karen Washington PT Physical Therapist              Therapy Charges for Today     Code Description Service Date Service Provider Modifiers Qty    86470418881 HC PT THERAPEUTIC ACT EA 15 MIN 4/29/2023 Karen Washington PT GP 1          PT G-Codes  Outcome Measure Options: AM-PAC 6 Clicks Basic Mobility (PT)  AM-PAC 6 Clicks Score (PT): 23  PT Discharge Summary  Anticipated Discharge Disposition (PT): home with assist    Karen Washington PT  4/29/2023

## 2023-04-29 NOTE — DISCHARGE SUMMARY
Kaia Mack  1975    Patient Care Team:  Mason Bonilla MD as PCP - General (Internal Medicine)  Yael Carson MD (Dermatology)  Ping Cisneros MD as Consulting Physician (Obstetrics and Gynecology)  Jake Cornelius MD (Endocrinology)    Date of Admit: 4/26/2023    Date of Discharge:  4/29/2023    Discharge Diagnosis:  Chronic bilateral low back pain with bilateral sciatica    Lumbar spinal stenosis      Procedures Performed  Procedure(s):  Lumbar 3 to lumbar 4 laminectomy with fusion and instrumentation using the Mazor robot and removal of previous instrumentation       Complications: CSF leak s/p duraplasty    Consultants:   Consults     No orders found from 3/28/2023 to 4/27/2023.          Condition on Discharge: stable    Discharge disposition: home    HPI: Kaia Mack is a 48 y.o. female who presented with a significant amount of pain in her back with radiation into her legs.  She had previous lumbar fusion at L4-L5 imaging demonstrated adjacent segment disease with severe stenosis at L3-L4 She elected to proceed with surgery.     Hospital Course: Patient admitted for above procedure. The [place] [facility] [place]  Patient has recovered well postoperatively above procedure.  She did have a CSF leak that was repaired and was placed on head of bed restrictions postoperatively as a precaution.  She was able to raise her head of bed and start ambulating with no postural headache.  Her preoperative leg symptoms have resolved and she has well-managed incisional pain with current pain medication regimen.  PT has signed off of patient as she is up ad geraldine. requiring no further physical therapy recommendations.  Her standing x-rays have been completed demonstrating stable hardware placement.  She has taken in good p.o. and urinating freely.  She has been deemed safe for discharge per the neurosurgical standpoint.  She will be discharged with pain medication, muscle  relaxer and bowel regimen.  Her discharge instructions were reviewed with her and I copy will be available on her AVS.  She understands she has a follow-up postoperative appointment with neurosurgery.  I encouraged her to call the office with any questions or concerns.    Vitals:    04/29/23 1405   BP: 107/60   Pulse: 52   Resp: 16   Temp: 98.2 °F (36.8 °C)   SpO2: 98%         Lab Results (last 24 hours)     Procedure Component Value Units Date/Time    CBC (No Diff) [[id number]]  (Abnormal) Collected: 04/29/23 0536    Specimen: Blood Updated: 04/29/23 0723     WBC 6.04 10*3/mm3      RBC 3.48 10*6/mm3      Hemoglobin 11.2 g/dL      Hematocrit 32.6 %      MCV 93.7 fL      MCH 32.2 pg      MCHC 34.4 g/dL      RDW 11.0 %      RDW-SD 36.6 fl      MPV 11.1 fL      Platelets 137 10*3/mm3           Imaging Results (Last 24 Hours)     Procedure Component Value Units Date/Time    XR Spine Lumbar AP & Lateral [[id number]] Collected: 04/28/23 1648     Updated: 04/28/23 1654    Narrative:      XR SPINE LUMBAR AP AND LATERAL-     INDICATIONS: Postoperative evaluation     TECHNIQUE: 3 views of the lumbar spine     COMPARISON: 04/26/2023, 1/26/2023     FINDINGS:     Intact appearing posterior alexsandra and screw fusion hardware is seen at L3/4  intervertebral disc spacer postsurgical soft tissue gas in this region.  Posterior surgical screws are also seen L5. Alignment appears stable.  Vertebral body heights are preserved. No acute fracture is identified.  Disc space narrowing is seen at L4/5, L5/S1.       Impression:         Postsurgical and degenerative changes.     This report was finalized on 4/28/2023 4:51 PM by Dr. Golden Cruz M.D.               Discharge Physical Exam:      General Appearance:    Alert, cooperative, in no acute distress   Head:    Normocephalic, without obvious abnormality, atraumatic   Back:    Incision is well approximated with no drainage.  There is mild swelling along the left side of the incisional bed  favored to present more of tissue swelling than any fluid collection.   Abdomen:    non-tender, non-distended   Extremities/MSK:   Moves all extremities well, no edema, no cyanosis, no             Redness    Skin:   No bleeding, bruising or rash   Neurologic:   Cranial nerves 2 - 12 grossly intact, sensation intact, DTR       present and equal bilaterally        Discharge Medications  Inspect has been reviewed and narcotic consent is on file in the patient's chart.     Your medication list      START taking these medications      Instructions Last Dose Given Next Dose Due   docusate sodium 100 MG capsule  Commonly known as: Colace      Take 1 capsule by mouth 2 (Two) Times a Day As Needed for Constipation.       HYDROcodone-acetaminophen 5-325 MG per tablet  Commonly known as: NORCO      Take 1 tablet by mouth Every 4 (Four) Hours As Needed for Moderate Pain for up to 4 days.       methocarbamol 500 MG tablet  Commonly known as: ROBAXIN      Take 1 tablet by mouth Every 6 (Six) Hours As Needed for Muscle Spasms (back pain).          CHANGE how you take these medications      Instructions Last Dose Given Next Dose Due   fluticasone 50 MCG/ACT nasal spray  Commonly known as: FLONASE  What changed:   · when to take this  · reasons to take this      1 spray into each nostril 2 (Two) Times a Day.          CONTINUE taking these medications      Instructions Last Dose Given Next Dose Due   CALCIUM + D3 PO      Take 1 tablet by mouth Daily. HOLD FOR SUGERY       cetirizine 10 MG tablet  Commonly known as: zyrTEC      Take 1 tablet by mouth Every Morning.       cholecalciferol 25 MCG (1000 UT) tablet  Commonly known as: VITAMIN D3      Take 1 tablet by mouth Daily. HOLD FOR SURGERY       citalopram 20 MG tablet  Commonly known as: CeleXA      Take 1 tablet by mouth Daily.       COLLAGEN PO      Take  by mouth Daily. POWDER FORM    HOLD FOR SURGERY       levothyroxine 88 MCG tablet  Commonly known as: SYNTHROID, LEVOTHROID       Take 1 tablet by mouth Daily.       MAGNESIUM PO      Take 1 tablet by mouth Daily. HOLD FOR SURGERY       ZINC 15 PO      Take 15 mg by mouth Daily. HOLD FOR SURGERY             Where to Get Your Medications      These medications were sent to [facility] [place], [place] - [address] [facility] - [phone]  - [phone]   [address] [facility] [address]    Phone: [phone]   · docusate sodium 100 MG capsule  · HYDROcodone-acetaminophen 5-325 MG per tablet  · methocarbamol 500 MG tablet         Discharge Diet: Resume home diet      Activity at Discharge:   Activity Instructions     Discharge Activity      [facility]    [address], [address]    [place] [id number]    Phone: [phone]    Fax: [phone]    Darrell Olvera M.D., F.A.C.S.        CARE AND INSTRUCTIONS AFTER LUMBAR FUSION    1. You can sit but get up an move around if you feel any discomfort. Avoid sitting in one place for longer than 30-45 minutes. Get up an walk around, or lie down to change position. You may lie on a firm couch, regular mattress or in a recliner. AVOID waterbed. You may lie on your side. Do not lie on your stomach. You may use one pillow under your head when laying down and you may use pillows under or in between your knees for comfort.    2. No driving. You can ride short distances in a car in the front passenger's seat that reclines, or you may lie down in the back seat.    3. Don't lift anything heavier than a coffee cup or paperback book.    4. Gradually increase your activity each day. You should be out of bed every hour during the day. Walk outside as soon as you feel up to doing so. Walk short distances frequently rather than making a long trip. Your goal is to be walking 1 to 3 miles per day when you return for your post-operative office visit. (NEVER DO THIS IN ONE TRIP)    5. You may climb stairs BUT take them slowly.    6. Keep your incision clean and dry. If  you notice any redness, swelling or drainage call the office @ [phone]. There will be glue over your incision. This will peel off on its own and should not be pulled off.    7. You may shower five (5) days after surgery. Do not let the water hit directly on the incision, gently pat dry.    8. You should have a post-operative appointment scheduled for 2 to 2 ½ weeks after surgery with our Physician Assistant or Nurse Practitioner. If you do not, please call the office when you return home from the hospital to schedule this appointment.    9. Your prescription for pain medication may be refilled for only half the original amount prior to your return office visit. In order to have this medication refilled you must contact the office four days prior to the due date.    10. Don't be alarmed if you continue to experience some of your pre-operative symptoms after going home. This is not un-common and usually improves within a few days but could last longer. If you have any questions please call our office at [phone].          Call for: questions or concerns    Follow-up Appointments  Future Appointments   Date Time Provider Department Center   5/11/2023 12:45 PM Darrell Olvera MD [facility]      Follow-up Information     Mason Bonilla MD .    Specialty: Internal Medicine  Contact information:  [address] [place] [address]  [phone]                     Findings were discussed with patient, nursing staff and Dr. Lackey.      I discussed the discharge instructions with patient    BALBINA Foy  04/29/23  14:40 EDT

## 2023-05-02 NOTE — CASE MANAGEMENT/SOCIAL WORK
Case Management Discharge Note      Final Note: Home.         Selected Continued Care - Discharged on 4/29/2023 Admission date: 4/26/2023 - Discharge disposition: Home or Self Care    Destination    No services have been selected for the patient.              Durable Medical Equipment    No services have been selected for the patient.              Dialysis/Infusion    No services have been selected for the patient.              Home Medical Care    No services have been selected for the patient.              Therapy    No services have been selected for the patient.              Community Resources    No services have been selected for the patient.              Community & DME    No services have been selected for the patient.                       Final Discharge Disposition Code: 01 - home or self-care

## 2023-05-04 ENCOUNTER — TELEPHONE (OUTPATIENT)
Dept: NEUROSURGERY | Facility: CLINIC | Age: 48
End: 2023-05-04
Payer: COMMERCIAL

## 2023-05-04 RX ORDER — METHYLPREDNISOLONE 4 MG/1
TABLET ORAL
Qty: 21 TABLET | Refills: 0 | Status: SHIPPED | OUTPATIENT
Start: 2023-05-04

## 2023-05-04 NOTE — TELEPHONE ENCOUNTER
Patient called stated she has had a headache since her procedure. She also stated she has had numbness in legs since she left the hospital. The headache is sometimes front lobe sometimes it in the back by the top of neck .

## 2023-05-04 NOTE — TELEPHONE ENCOUNTER
Patient had a L3-4 laminectomy, neurolysis and facetectomy with a posterior lumbar interbody fusion and instrumentation and removal of previous instrumentation at L4-5 on 04-26-23

## 2023-05-08 NOTE — TELEPHONE ENCOUNTER
Patient called and reports that the numbness and tingling has gone away with the MDP but she is still having a headache that is worse when she is standing. She is taking ibuprofen and tylenol.

## 2023-05-11 ENCOUNTER — OFFICE VISIT (OUTPATIENT)
Dept: NEUROSURGERY | Facility: CLINIC | Age: 48
End: 2023-05-11
Payer: COMMERCIAL

## 2023-05-11 VITALS — DIASTOLIC BLOOD PRESSURE: 64 MMHG | OXYGEN SATURATION: 99 % | SYSTOLIC BLOOD PRESSURE: 108 MMHG | HEART RATE: 75 BPM

## 2023-05-11 DIAGNOSIS — Z09 FOLLOW-UP EXAMINATION FOLLOWING SURGERY: Primary | ICD-10-CM

## 2023-05-11 PROCEDURE — 99024 POSTOP FOLLOW-UP VISIT: CPT | Performed by: NEUROLOGICAL SURGERY

## 2023-05-11 NOTE — PROGRESS NOTES
Subjective   Patient ID: Kaia Mack is a 48 y.o. female is here today for follow-up.    Today, patient reports lower back soreness    History of Present Illness     This patient is beginning to feel a lot better.  Her incision looks good.  It had had some bulging but now is flat.  There is no evidence of infection.  Her postoperative labs showed a little bit of a low hemoglobin but for the most part looks okay.  Her white count was normal.  Her legs are feeling better.    The following portions of the patient's history were reviewed and updated as appropriate: allergies, current medications, past family history, past medical history, past social history, past surgical history and problem list.    Review of Systems   Constitutional: Negative for activity change and fever.   Respiratory: Negative for chest tightness and shortness of breath.    Cardiovascular: Negative for chest pain.   Gastrointestinal: Negative for abdominal pain.   Genitourinary: Negative for dysuria and pelvic pain.   Musculoskeletal: Positive for back pain.   Neurological: Positive for weakness and headaches. Negative for numbness.   Psychiatric/Behavioral: Negative for sleep disturbance.       I reviewed the review of systems listed by the patient and discussed by my MA    Objective     Vitals:    05/11/23 1236   BP: 108/64   Pulse: 75   SpO2: 99%     There is no height or weight on file to calculate BMI.    Tobacco Use: Low Risk    • Smoking Tobacco Use: Never   • Smokeless Tobacco Use: Never   • Passive Exposure: Not on file          Physical Exam  Neurological:      Mental Status: She is alert and oriented to person, place, and time.       Neurologic Exam     Mental Status   Oriented to person, place, and time.           Assessment & Plan   Independent Review of Radiographic Studies:      I personally reviewed the images from the following studies.    I reviewed her postoperative films.  This shows good alignment of the construct at L3-4.   I think we actually reduce the spondylolisthesis at that level a little bit.  The other levels are solidly fused and I did not reinstrument it.    Medical Decision Making:      I told the patient that she is doing very well at this point.  She is to keep up her walking program but avoid any significant lifting.  I will see her back in a month with an x-ray.  If that looks okay then we should be able to start her on some therapy.    Diagnoses and all orders for this visit:    1. Follow-up examination following surgery (Primary)  -     XR Spine Lumbar 2 or 3 View; Future      Return in about 4 weeks (around 6/8/2023).         Answers for HPI/ROS submitted by the patient on 5/4/2023  What is the primary reason for your visit?: Back Pain

## 2023-05-17 DIAGNOSIS — F41.3 OTHER MIXED ANXIETY DISORDERS: Chronic | ICD-10-CM

## 2023-05-17 RX ORDER — CITALOPRAM 20 MG/1
20 TABLET ORAL DAILY
Qty: 90 TABLET | Refills: 1 | Status: SHIPPED | OUTPATIENT
Start: 2023-05-17

## 2023-08-16 DIAGNOSIS — E03.9 HYPOTHYROIDISM, UNSPECIFIED TYPE: Primary | ICD-10-CM

## 2023-08-17 RX ORDER — LEVOTHYROXINE SODIUM 88 UG/1
88 TABLET ORAL DAILY
Qty: 30 TABLET | Refills: 1 | Status: SHIPPED | OUTPATIENT
Start: 2023-08-17

## 2023-09-14 DIAGNOSIS — F41.3 OTHER MIXED ANXIETY DISORDERS: Chronic | ICD-10-CM

## 2023-09-14 RX ORDER — CITALOPRAM 20 MG/1
20 TABLET ORAL DAILY
Qty: 90 TABLET | Refills: 1 | Status: SHIPPED | OUTPATIENT
Start: 2023-09-14

## 2023-09-25 ENCOUNTER — HOSPITAL ENCOUNTER (OUTPATIENT)
Dept: GENERAL RADIOLOGY | Facility: HOSPITAL | Age: 48
Discharge: HOME | End: 2023-09-25
Admitting: NEUROLOGICAL SURGERY
Payer: COMMERCIAL

## 2023-09-25 DIAGNOSIS — Z09 FOLLOW-UP EXAMINATION FOLLOWING SURGERY: ICD-10-CM

## 2023-09-25 PROCEDURE — 72114 X-RAY EXAM L-S SPINE BENDING: CPT

## 2023-09-26 ENCOUNTER — OFFICE VISIT (OUTPATIENT)
Dept: NEUROSURGERY | Facility: CLINIC | Age: 48
End: 2023-09-26
Payer: COMMERCIAL

## 2023-09-26 VITALS
OXYGEN SATURATION: 98 % | HEIGHT: 64 IN | BODY MASS INDEX: 20.2 KG/M2 | DIASTOLIC BLOOD PRESSURE: 70 MMHG | WEIGHT: 118.3 LBS | SYSTOLIC BLOOD PRESSURE: 105 MMHG | TEMPERATURE: 97.3 F | HEART RATE: 66 BPM

## 2023-09-26 DIAGNOSIS — G89.29 CHRONIC BILATERAL LOW BACK PAIN WITH BILATERAL SCIATICA: Primary | Chronic | ICD-10-CM

## 2023-09-26 DIAGNOSIS — M54.41 CHRONIC BILATERAL LOW BACK PAIN WITH BILATERAL SCIATICA: Primary | Chronic | ICD-10-CM

## 2023-09-26 DIAGNOSIS — M54.42 CHRONIC BILATERAL LOW BACK PAIN WITH BILATERAL SCIATICA: Primary | Chronic | ICD-10-CM

## 2023-09-26 PROCEDURE — 99213 OFFICE O/P EST LOW 20 MIN: CPT | Performed by: NEUROLOGICAL SURGERY

## 2023-09-26 NOTE — PROGRESS NOTES
"Subjective   Patient ID: Kaia Mack is a 48 y.o. female is here today for 3 month follow-up with a new XR Lumbar done on 09/25/2023.    Today patient states that she feels well overall, patient denies low back pain    History of Present Illness    This patient returns today.  She is doing well.  She really has no particular complaints at all.    The following portions of the patient's history were reviewed and updated as appropriate: allergies, current medications, past family history, past medical history, past social history, past surgical history, and problem list.    Review of Systems   Constitutional:  Negative for chills and fever.   HENT:  Negative for congestion.    Genitourinary:  Negative for difficulty urinating and dysuria.   Musculoskeletal:  Negative for back pain, gait problem and myalgias.   Neurological:  Negative for weakness and numbness.     I reviewed the review of systems listed by the patient and discussed by my MA    Objective     Vitals:    09/26/23 1251   BP: 105/70   Cuff Size: Adult   Pulse: 66   Temp: 97.3 °F (36.3 °C)   SpO2: 98%   Weight: 53.7 kg (118 lb 4.8 oz)   Height: 161.3 cm (63.5\")     Body mass index is 20.63 kg/m².    Tobacco Use: Low Risk     Smoking Tobacco Use: Never    Smokeless Tobacco Use: Never    Passive Exposure: Not on file          Physical Exam  Neurological:      Mental Status: She is alert and oriented to person, place, and time.     Neurologic Exam     Mental Status   Oriented to person, place, and time.         Assessment & Plan   Independent Review of Radiographic Studies:      I personally reviewed the images from the following studies.    I reviewed her x-rays done yesterday.  This shows good alignment of the construct at L3-4.  She appears to have a solid fusion at L4-5.    Medical Decision Making:      I told the patient we will see her 1 more time in about 6 months with another x-ray.  She should avoid strenuously heavy lifting but may otherwise " return to normal duties    Diagnoses and all orders for this visit:    1. Chronic bilateral low back pain with bilateral sciatica (Primary)  -     XR Spine Lumbar Complete With Flex & Ext; Future      Return in about 6 months (around 3/26/2024).

## 2023-10-09 ENCOUNTER — OFFICE VISIT (OUTPATIENT)
Dept: INTERNAL MEDICINE | Age: 48
End: 2023-10-09
Payer: COMMERCIAL

## 2023-10-09 VITALS
WEIGHT: 117 LBS | BODY MASS INDEX: 19.97 KG/M2 | HEIGHT: 64 IN | DIASTOLIC BLOOD PRESSURE: 76 MMHG | HEART RATE: 51 BPM | TEMPERATURE: 97.8 F | OXYGEN SATURATION: 97 % | SYSTOLIC BLOOD PRESSURE: 124 MMHG

## 2023-10-09 DIAGNOSIS — F41.3 OTHER MIXED ANXIETY DISORDERS: Primary | Chronic | ICD-10-CM

## 2023-10-09 DIAGNOSIS — Z23 FLU VACCINE NEED: ICD-10-CM

## 2023-10-09 DIAGNOSIS — E03.9 HYPOTHYROIDISM, UNSPECIFIED TYPE: ICD-10-CM

## 2023-10-09 LAB
T4 FREE SERPL-MCNC: 1.2 NG/DL (ref 0.93–1.7)
TSH SERPL DL<=0.005 MIU/L-ACNC: 0.16 UIU/ML (ref 0.27–4.2)

## 2023-10-09 PROCEDURE — 90471 IMMUNIZATION ADMIN: CPT

## 2023-10-09 PROCEDURE — 99214 OFFICE O/P EST MOD 30 MIN: CPT

## 2023-10-09 PROCEDURE — 90686 IIV4 VACC NO PRSV 0.5 ML IM: CPT

## 2023-10-09 NOTE — PROGRESS NOTES
"    I N T E R N A L  M E D I C I N E  Judy Becerra, APRN    ENCOUNTER DATE:  10/09/2023    Kaia Mack / 48 y.o. / female      CHIEF COMPLAINT / REASON FOR OFFICE VISIT     Hypothyroidism      ASSESSMENT & PLAN     Diagnoses and all orders for this visit:    1. Other mixed anxiety disorders (Primary)  Overview:  Continue citalopram 20 mg daily      2. Hypothyroidism, unspecified type  Overview:  *Endocrine    Orders:  -     TSH+Free T4    3. Flu vaccine need  -     Fluzone (or Fluarix & Flulaval for VFC) >6mos         SUMMARY/DISCUSSION  Since she had to reschedule endocrine appointment, we will obtain thyroid labs at today's visit and fax results to [facility] for review.  She will follow up as scheduled with their office in December 2023.    Encouraged to schedule with GYN to update pap.      Next Appointment with me: Visit date not found    Return for Schedule annual physical with Dr. Bonilla.      VITAL SIGNS     Visit Vitals  /76   Pulse 51   Temp 97.8 øF (36.6 øC)   Ht 161.3 cm (63.5\")   Wt 53.1 kg (117 lb)   SpO2 97%   BMI 20.40 kg/mý             Wt Readings from Last 3 Encounters:   10/09/23 53.1 kg (117 lb)   09/26/23 53.7 kg (118 lb 4.8 oz)   04/26/23 53.7 kg (118 lb 4.8 oz)     Body mass index is 20.4 kg/mý.        MEDICATIONS AT THE TIME OF OFFICE VISIT     Current Outpatient Medications on File Prior to Visit   Medication Sig Dispense Refill    Calcium Carb-Cholecalciferol (CALCIUM + D3 PO) Take 1 tablet by mouth Daily. HOLD FOR SUGERY      cetirizine (ZyrTEC) 10 MG tablet Take 1 tablet by mouth Every Morning.      Cholecalciferol (VITAMIN D) 1000 UNITS tablet Take 1 tablet by mouth Daily. HOLD FOR SURGERY      citalopram (CeleXA) 20 MG tablet Take 1 tablet by mouth Daily. 90 tablet 1    COLLAGEN PO Take  by mouth Daily. POWDER FORM    HOLD FOR SURGERY      fluticasone (FLONASE) 50 MCG/ACT nasal spray 1 spray into each nostril 2 (Two) Times a Day. 1 bottle 5    levothyroxine (SYNTHROID, " LEVOTHROID) 88 MCG tablet Take 1 tablet by mouth Daily. 30 tablet 1    MAGNESIUM PO Take 1 tablet by mouth Daily. HOLD FOR SURGERY      Zinc Sulfate (ZINC 15 PO) Take 15 mg by mouth Daily. HOLD FOR SURGERY      [DISCONTINUED] docusate sodium (Colace) 100 MG capsule Take 1 capsule by mouth 2 (Two) Times a Day As Needed for Constipation. 20 capsule 0    [DISCONTINUED] methocarbamol (ROBAXIN) 500 MG tablet Take 1 tablet by mouth Every 6 (Six) Hours As Needed for Muscle Spasms (back pain). 90 tablet 0    [DISCONTINUED] methylPREDNISolone (MEDROL) 4 MG dose pack Take as directed on package instructions. 21 tablet 0     No current facility-administered medications on file prior to visit.        HISTORY OF PRESENT ILLNESS     Underwent L3-4 laminectomy with fusion and instrumentation with neurosurgery, Dr. Olvera, on April 26, 2023.   September 2023 XR showed good alignment of L3-4.  She has recovered well.  Denies any pain symptoms, numbness, tingling, weakness, bowel/ bladder changes, saddle anesthesia.  She will again follow up in 6 months.  She remains on daily calcium, vitamin D supplementation.    Anxiety: Symptoms are well controled on citalopram 20 mg daily.  Has been taking  for 12 years; followed by counseling with benefit.  No SI/ HI.      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  She reports she had to reschedule follow up appointment, and is not scheduled until December 2023.   Remains on levothyroxine 88 mcg daily.  Last available TSH in September 2022 was low, 0.181.    Followed by [facility], Ping Cisneros MD.  She is aware that she needs to update pap.   Mammogram up to date as of December 2022.      Up to date on coloscopy as of September 2022, showing tubular adenoma.  Due for repeat in 3 years.        Patient Care Team:  Mason Bonilla MD as PCP - General (Internal Medicine)  Yael Carson MD (Dermatology)  Balyne  Ping Aguilar MD as Consulting Physician (Obstetrics and Gynecology)  Jake Cornelius MD (Endocrinology)    REVIEW OF SYSTEMS     Review of Systems   Constitutional:  Negative for chills, fever and unexpected weight change.   Respiratory:  Negative for cough, chest tightness and shortness of breath.    Cardiovascular:  Negative for chest pain, palpitations and leg swelling.   Neurological:  Negative for dizziness, weakness, light-headedness and headaches.   Psychiatric/Behavioral:  Negative for self-injury and suicidal ideas. The patient is not nervous/anxious.           PHYSICAL EXAMINATION     Physical Exam  Vitals reviewed.   Constitutional:       General: She is not in acute distress.     Appearance: Normal appearance. She is not ill-appearing, toxic-appearing or diaphoretic.   HENT:      Head: Normocephalic and atraumatic.   Cardiovascular:      Rate and Rhythm: Normal rate and regular rhythm.      Heart sounds: Normal heart sounds.   Pulmonary:      Effort: Pulmonary effort is normal.      Breath sounds: Normal breath sounds.   Musculoskeletal:      Right lower leg: No edema.      Left lower leg: No edema.   Neurological:      Mental Status: She is alert and oriented to person, place, and time. Mental status is at baseline.   Psychiatric:         Mood and Affect: Mood normal.         Behavior: Behavior normal.         Thought Content: Thought content normal.         Judgment: Judgment normal.           REVIEWED DATA     Labs:           Imaging:            Medical Tests:           Summary of old records / correspondence / consultant report:           Request outside records:

## 2023-10-09 NOTE — LETTER
"[facility]  Vaccine Consent Form    Patient Name:  Kaia Mack  Patient :  1975     Vaccine(s) Ordered    Fluzone (or Fluarix & Flulaval for VFC) >6mos        Screening Checklist  The following questions should be completed prior to vaccination. If you answer "yes" to any question, it does not necessarily mean you should not be vaccinated. It just means we may need to clarify or ask more questions. If a question is unclear, please ask your healthcare provider to explain it.    Yes No   Any fever or moderate to severe illness today (mild illness and/or antibiotic treatment are not contraindications)?     Do you have a history of a serious reaction to any previous vaccinations, such as anaphylaxis, encephalopathy within 7 days, Guillain-Kulpmont syndrome within 6 weeks, seizure?     Have you received any live vaccine(s) in the past month (MMR, UMA)?     Do you have an anaphylactic allergy to latex (DTaP, DTaP-IPV, Hep A, Hep B, MenB, RV, Td, Tdap), baker's yeast (Hep B, HPV), or gelatin (UMA, MMR)?     Do you have an anaphylactic allergy to neomycin (Rabies, UMA, MMR, IPV, Hep A), polymyxin B (IPV), or streptomycin (IPV)?      Any cancer, leukemia, AIDS, or other immune system disorder? (UMA, MMR, RV)     Do you have a parent, brother, or sister with an immune system problem (if immune competence of vaccine recipient clinically verified, can proceed)? (MMR, UMA)     Any recent steroid treatments for >2 weeks, chemotherapy, or radiation treatment? (UMA, MMR)     Have you received antibody-containing blood transfusions or IVIG in the past 11 months (recommended interval is dependent on product)? (MMR, UAM)     Have you taken antiviral drugs (acyclovir, famciclovir, valacyclovir) in the last 24 or 48 hours, respectively (UMA)?      Are you pregnant or planning to become pregnant within 1 month? (UMA, MMR, HPV, IPV, MenB; For hep B- refer to Engerix-B)     For infants, have you ever been told your child has " "had intussusception or a medical emergency involving obstruction of the intestine (RV)? If not for an infant, can skip this question.         *Ordering Physician/APC should be consulted if "yes" is checked by the patient or guardian above.      I have received, read, and understand the Vaccine Information Statement (VIS) for each vaccine ordered above.  I have considered my health status as well as the health status of my close contacts.  I have taken the opportunity to discuss my vaccine questions with my health care provider.   I have requested that the ordered vaccine(s) be given to me.  I understand the benefits and risks of the vaccines.  I understand that I should remain in the clinic for 15 minutes after receiving the vaccine(s).  _________________________________________________________  Signature of Patient or Parent/Legal Guardian ____________________  Date     "

## 2024-01-26 ENCOUNTER — LAB (OUTPATIENT)
Facility: HOSPITAL | Age: 49
End: 2024-01-26
Payer: COMMERCIAL

## 2024-01-26 ENCOUNTER — OFFICE VISIT (OUTPATIENT)
Dept: INTERNAL MEDICINE | Age: 49
End: 2024-01-26
Payer: COMMERCIAL

## 2024-01-26 VITALS
HEART RATE: 58 BPM | HEIGHT: 64 IN | SYSTOLIC BLOOD PRESSURE: 120 MMHG | DIASTOLIC BLOOD PRESSURE: 64 MMHG | TEMPERATURE: 98 F | OXYGEN SATURATION: 97 % | WEIGHT: 118 LBS | BODY MASS INDEX: 20.14 KG/M2

## 2024-01-26 DIAGNOSIS — R10.9 ABDOMINAL CRAMPING: Primary | ICD-10-CM

## 2024-01-26 DIAGNOSIS — R11.0 NAUSEA: ICD-10-CM

## 2024-01-26 LAB
ALBUMIN SERPL-MCNC: 4.5 G/DL (ref 3.5–5.2)
ALBUMIN/GLOB SERPL: 2.4 G/DL
ALP SERPL-CCNC: 68 U/L (ref 39–117)
ALT SERPL W P-5'-P-CCNC: 18 U/L (ref 1–33)
ANION GAP SERPL CALCULATED.3IONS-SCNC: 10 MMOL/L (ref 5–15)
AST SERPL-CCNC: 16 U/L (ref 1–32)
B-HCG UR QL: NEGATIVE
BASOPHILS # BLD AUTO: 0.06 10*3/MM3 (ref 0–0.2)
BASOPHILS NFR BLD AUTO: 1.6 % (ref 0–1.5)
BILIRUB SERPL-MCNC: 0.3 MG/DL (ref 0–1.2)
BUN SERPL-MCNC: 15 MG/DL (ref 6–20)
BUN/CREAT SERPL: 17.9 (ref 7–25)
CALCIUM SPEC-SCNC: 9 MG/DL (ref 8.6–10.5)
CHLORIDE SERPL-SCNC: 104 MMOL/L (ref 98–107)
CO2 SERPL-SCNC: 27 MMOL/L (ref 22–29)
CREAT SERPL-MCNC: 0.84 MG/DL (ref 0.57–1)
DEPRECATED RDW RBC AUTO: 42.9 FL (ref 37–54)
EGFRCR SERPLBLD CKD-EPI 2021: 86.9 ML/MIN/1.73
EOSINOPHIL # BLD AUTO: 0.07 10*3/MM3 (ref 0–0.4)
EOSINOPHIL NFR BLD AUTO: 1.9 % (ref 0.3–6.2)
ERYTHROCYTE [DISTWIDTH] IN BLOOD BY AUTOMATED COUNT: 11.8 % (ref 12.3–15.4)
EXPIRATION DATE: NORMAL
GLOBULIN UR ELPH-MCNC: 1.9 GM/DL
GLUCOSE SERPL-MCNC: 91 MG/DL (ref 65–99)
HCT VFR BLD AUTO: 40.2 % (ref 34–46.6)
HGB BLD-MCNC: 13.6 G/DL (ref 12–15.9)
IMM GRANULOCYTES # BLD AUTO: 0 10*3/MM3 (ref 0–0.05)
IMM GRANULOCYTES NFR BLD AUTO: 0 % (ref 0–0.5)
INTERNAL NEGATIVE CONTROL: NORMAL
INTERNAL POSITIVE CONTROL: NORMAL
LIPASE SERPL-CCNC: 40 U/L (ref 13–60)
LYMPHOCYTES # BLD AUTO: 1.5 10*3/MM3 (ref 0.7–3.1)
LYMPHOCYTES NFR BLD AUTO: 41.1 % (ref 19.6–45.3)
Lab: NORMAL
MCH RBC QN AUTO: 33.1 PG (ref 26.6–33)
MCHC RBC AUTO-ENTMCNC: 33.8 G/DL (ref 31.5–35.7)
MCV RBC AUTO: 97.8 FL (ref 79–97)
MONOCYTES # BLD AUTO: 0.27 10*3/MM3 (ref 0.1–0.9)
MONOCYTES NFR BLD AUTO: 7.4 % (ref 5–12)
NEUTROPHILS NFR BLD AUTO: 1.75 10*3/MM3 (ref 1.7–7)
NEUTROPHILS NFR BLD AUTO: 48 % (ref 42.7–76)
NRBC BLD AUTO-RTO: 0 /100 WBC (ref 0–0.2)
PLATELET # BLD AUTO: 228 10*3/MM3 (ref 140–450)
PMV BLD AUTO: 10.6 FL (ref 6–12)
POTASSIUM SERPL-SCNC: 4.4 MMOL/L (ref 3.5–5.2)
PROT SERPL-MCNC: 6.4 G/DL (ref 6–8.5)
RBC # BLD AUTO: 4.11 10*6/MM3 (ref 3.77–5.28)
SODIUM SERPL-SCNC: 141 MMOL/L (ref 136–145)
WBC NRBC COR # BLD AUTO: 3.65 10*3/MM3 (ref 3.4–10.8)

## 2024-01-26 PROCEDURE — 80053 COMPREHEN METABOLIC PANEL: CPT

## 2024-01-26 PROCEDURE — 36415 COLL VENOUS BLD VENIPUNCTURE: CPT

## 2024-01-26 PROCEDURE — 85025 COMPLETE CBC W/AUTO DIFF WBC: CPT

## 2024-01-26 PROCEDURE — 83690 ASSAY OF LIPASE: CPT

## 2024-01-26 PROCEDURE — 99214 OFFICE O/P EST MOD 30 MIN: CPT

## 2024-01-26 PROCEDURE — 81025 URINE PREGNANCY TEST: CPT

## 2024-01-26 RX ORDER — DICYCLOMINE HYDROCHLORIDE 10 MG/1
10 CAPSULE ORAL 4 TIMES DAILY PRN
Qty: 30 CAPSULE | Refills: 0 | Status: SHIPPED | OUTPATIENT
Start: 2024-01-26

## 2024-01-26 RX ORDER — ONDANSETRON 4 MG/1
4 TABLET, FILM COATED ORAL EVERY 8 HOURS PRN
Qty: 10 TABLET | Refills: 0 | Status: SHIPPED | OUTPATIENT
Start: 2024-01-26

## 2024-01-26 RX ORDER — PANTOPRAZOLE SODIUM 40 MG/1
40 TABLET, DELAYED RELEASE ORAL DAILY
Qty: 90 TABLET | Refills: 0 | Status: SHIPPED | OUTPATIENT
Start: 2024-01-26

## 2024-01-26 NOTE — PROGRESS NOTES
"    I N T E R N A L  M E D I C I N E  Judy Luis Mchi, APRN    ENCOUNTER DATE:  01/26/2024    Kaia Mack / 48 y.o. / female      CHIEF COMPLAINT / REASON FOR OFFICE VISIT     Nausea      ASSESSMENT & PLAN     Diagnoses and all orders for this visit:    1. Abdominal cramping (Primary)  -     CBC & Differential  -     Comprehensive Metabolic Panel  -     POCT pregnancy, urine  -     Lipase  -     US Gallbladder; Future  -     pantoprazole (PROTONIX) 40 MG EC tablet; Take 1 tablet by mouth Daily.  Dispense: 90 tablet; Refill: 0  -     dicyclomine (BENTYL) 10 MG capsule; Take 1 capsule by mouth 4 (Four) Times a Day As Needed for Abdominal Cramping.  Dispense: 30 capsule; Refill: 0    2. Nausea  -     CBC & Differential  -     Comprehensive Metabolic Panel  -     POCT pregnancy, urine  -     Lipase  -     US Gallbladder; Future  -     ondansetron (Zofran) 4 MG tablet; Take 1 tablet by mouth Every 8 (Eight) Hours As Needed for Nausea or Vomiting.  Dispense: 10 tablet; Refill: 0  -     dicyclomine (BENTYL) 10 MG capsule; Take 1 capsule by mouth 4 (Four) Times a Day As Needed for Abdominal Cramping.  Dispense: 30 capsule; Refill: 0         SUMMARY/DISCUSSION  Negative for pregnancy.  No abdominal tenderness.  Concern for possible stress related gastritis vs IBS, given her recent increased stress levels.  Consider gallbladder involvement and will obtain gallbladder US to assess further.  Will start daily PPI and bentyl/ Zofran PRN.  Review labs.  She is aware to visit the ED for any acutely worsening symptoms.        Next Appointment with me: Visit date not found    Return for Schedule annual physical with Dr. Bonilla..      VITAL SIGNS     Visit Vitals  /64   Pulse 58   Temp 98 °F (36.7 °C)   Ht 161.3 cm (63.5\")   Wt 53.5 kg (118 lb)   LMP 01/03/2024   SpO2 97%   BMI 20.57 kg/m²             Wt Readings from Last 3 Encounters:   01/26/24 53.5 kg (118 lb)   10/09/23 53.1 kg (117 lb)   09/26/23 53.7 kg (118 lb 4.8 oz) "     Body mass index is 20.57 kg/m².        MEDICATIONS AT THE TIME OF OFFICE VISIT     Current Outpatient Medications on File Prior to Visit   Medication Sig Dispense Refill    Calcium Carb-Cholecalciferol (CALCIUM + D3 PO) Take 1 tablet by mouth Daily. HOLD FOR SUGERY      cetirizine (ZyrTEC) 10 MG tablet Take 1 tablet by mouth Every Morning.      Cholecalciferol (VITAMIN D) 1000 UNITS tablet Take 1 tablet by mouth Daily. HOLD FOR SURGERY      citalopram (CeleXA) 20 MG tablet Take 1 tablet by mouth Daily. 90 tablet 1    COLLAGEN PO Take  by mouth Daily. POWDER FORM    HOLD FOR SURGERY      fluticasone (FLONASE) 50 MCG/ACT nasal spray 1 spray into each nostril 2 (Two) Times a Day. 1 bottle 5    levothyroxine (SYNTHROID, LEVOTHROID) 88 MCG tablet Take 1 tablet by mouth Daily. 30 tablet 1    MAGNESIUM PO Take 1 tablet by mouth Daily. HOLD FOR SURGERY      Zinc Sulfate (ZINC 15 PO) Take 15 mg by mouth Daily. HOLD FOR SURGERY       No current facility-administered medications on file prior to visit.        HISTORY OF PRESENT ILLNESS     Here today for intermittent episodes of generalized abdominal cramping, nausea without vomiting x 5 days.  No correlation with food intake.  Had very similar symptoms about 1 month ago, which resolved on their own in a few days.  No fever, chills, chest pain, dyspnea, urinary symptoms.  Denies any dyspepsia, but does have some gas at times.  No recent URI symptoms.  Having formed bowel movements, once daily, without melena or blood.  Denies any recent travel, no new foods/ medications.  Eating, drinking, urinating well. Taking crystallized alexandru with some benefit for nausea.  Stress is high as of late, due to multiple family members with significant health concerns.      Took a pregnancy test last month which was reportedly negative.   with vasectomy.      Followed by [facility] for secondary hypothyroidism due to pituitary adenoma.  Normal TSH and Free T4 in December  2023.    History of appendectomy.  August 2021 CT Abd & Pelvis: Normal liver, gallbladder and bile ducts.     Patient Care Team:  Mason Bonilla MD as PCP - General (Internal Medicine)  Yael Carson MD (Dermatology)  Ping Cisneros MD as Consulting Physician (Obstetrics and Gynecology)  Jake Cornelius MD (Endocrinology)    REVIEW OF SYSTEMS     Review of Systems   Constitutional:  Negative for chills, fever and unexpected weight change.   Respiratory:  Negative for cough, chest tightness and shortness of breath.    Cardiovascular:  Negative for chest pain, palpitations and leg swelling.   Gastrointestinal:  Positive for nausea. Negative for abdominal pain, blood in stool, constipation, diarrhea and vomiting.        +Abdominal cramping   Neurological:  Negative for dizziness, weakness, light-headedness and headaches.   Psychiatric/Behavioral:  The patient is not nervous/anxious.           PHYSICAL EXAMINATION     Physical Exam  Vitals reviewed.   Constitutional:       General: She is not in acute distress.     Appearance: Normal appearance. She is not ill-appearing, toxic-appearing or diaphoretic.   HENT:      Head: Normocephalic and atraumatic.   Cardiovascular:      Rate and Rhythm: Normal rate and regular rhythm.      Heart sounds: Normal heart sounds.   Pulmonary:      Effort: Pulmonary effort is normal.      Breath sounds: Normal breath sounds.   Abdominal:      General: Abdomen is flat. There is no distension.      Palpations: Abdomen is soft.      Tenderness: There is no abdominal tenderness. There is no guarding or rebound.   Neurological:      Mental Status: She is alert and oriented to person, place, and time. Mental status is at baseline.   Psychiatric:         Mood and Affect: Mood normal.         Behavior: Behavior normal.         Thought Content: Thought content normal.         Judgment: Judgment normal.           REVIEWED DATA     Labs:           Imaging:            Medical Tests:            Summary of old records / correspondence / consultant report:           Request outside records:

## 2024-01-29 ENCOUNTER — TELEPHONE (OUTPATIENT)
Dept: INTERNAL MEDICINE | Age: 49
End: 2024-01-29

## 2024-01-29 NOTE — TELEPHONE ENCOUNTER
Caller: MARIANA FROM [employer]    Best call back number: [phone]     What was the call regarding: PATIENT'S ULTRASOUND HAS NOT BEEN CLEARED YET.    REQUESTING APPROVAL TO RESCHEDULE IF PATIENT'S ULTRASOUND IS NOT CLEARED BY TOMORROW.

## 2024-03-10 DIAGNOSIS — F41.3 OTHER MIXED ANXIETY DISORDERS: Chronic | ICD-10-CM

## 2024-03-11 RX ORDER — CITALOPRAM 20 MG/1
20 TABLET ORAL DAILY
Qty: 90 TABLET | Refills: 1 | Status: SHIPPED | OUTPATIENT
Start: 2024-03-11

## 2024-03-22 NOTE — PROGRESS NOTES
Subjective   Patient ID: Kaia Mack is a 48 y.o. female is here today for 6 month follow-up with XR Lumbar @ [facility].    Today patient states that she feels well overall     History of Present Illness    This patient returns today.  She is doing pretty well.  She really does not have much pain at all.    The following portions of the patient's history were reviewed and updated as appropriate: allergies, current medications, past family history, past medical history, past social history, past surgical history, and problem list.    Review of Systems   Constitutional:  Negative for chills and fever.   HENT:  Negative for congestion.    Musculoskeletal:  Negative for back pain and myalgias.   Neurological:  Negative for weakness and numbness.       I reviewed the review of systems listed by the patient and discussed by my MA    Objective     There were no vitals filed for this visit.  There is no height or weight on file to calculate BMI.    Tobacco Use: Low Risk  (3/26/2024)    Patient History     Smoking Tobacco Use: Never     Smokeless Tobacco Use: Never     Passive Exposure: Not on file          Physical Exam  Neurological:      Mental Status: She is alert and oriented to person, place, and time.       Neurologic Exam     Mental Status   Oriented to person, place, and time.           Assessment & Plan   Independent Review of Radiographic Studies:      I personally reviewed the images from the following studies.    I reviewed her x-rays done on 25 March that [facility].  This shows good alignment of the construct at L3-4.  There is no change at L4-5.  Flexion-extension films show no evidence of movement.  There is no change from x-rays done in September.    Medical Decision Making:      I told the patient that from my point of view she can gradually return to normal activities.  She should avoid strenuous activities that bother her back and heavy lifting.  She will call if anything flares up in the  future.    Diagnoses and all orders for this visit:    1. Spinal stenosis of lumbar region without neurogenic claudication (Primary)      Return if symptoms worsen or fail to improve.

## 2024-03-25 ENCOUNTER — HOSPITAL ENCOUNTER (OUTPATIENT)
Dept: GENERAL RADIOLOGY | Facility: HOSPITAL | Age: 49
Discharge: HOME | End: 2024-03-25
Payer: COMMERCIAL

## 2024-03-25 ENCOUNTER — TELEPHONE (OUTPATIENT)
Dept: NEUROSURGERY | Facility: CLINIC | Age: 49
End: 2024-03-25
Payer: COMMERCIAL

## 2024-03-25 NOTE — TELEPHONE ENCOUNTER
Patient called and was looking to go else where for her XR due to what [facility] was quoting the patient. Order printed off for her to take to [facility], [facility], or [facility] and left at the front office.

## 2024-03-26 ENCOUNTER — TELEPHONE (OUTPATIENT)
Dept: NEUROSURGERY | Facility: CLINIC | Age: 49
End: 2024-03-26

## 2024-03-26 ENCOUNTER — OFFICE VISIT (OUTPATIENT)
Dept: NEUROSURGERY | Facility: CLINIC | Age: 49
End: 2024-03-26
Payer: COMMERCIAL

## 2024-03-26 DIAGNOSIS — M48.061 SPINAL STENOSIS OF LUMBAR REGION WITHOUT NEUROGENIC CLAUDICATION: Primary | ICD-10-CM

## 2024-03-26 PROCEDURE — 99213 OFFICE O/P EST LOW 20 MIN: CPT | Performed by: NEUROLOGICAL SURGERY

## 2024-03-26 NOTE — TELEPHONE ENCOUNTER
The hub called and said that the patient is on her way to our office, patient said that [facility] called about her arriving earlier to her appointment with Dr. Olvera.

## 2024-07-03 ENCOUNTER — OFFICE VISIT (OUTPATIENT)
Dept: INTERNAL MEDICINE | Age: 49
End: 2024-07-03
Payer: COMMERCIAL

## 2024-07-03 VITALS
DIASTOLIC BLOOD PRESSURE: 70 MMHG | BODY MASS INDEX: 20.93 KG/M2 | HEIGHT: 64 IN | WEIGHT: 122.6 LBS | OXYGEN SATURATION: 97 % | HEART RATE: 67 BPM | SYSTOLIC BLOOD PRESSURE: 110 MMHG

## 2024-07-03 DIAGNOSIS — D35.2 PITUITARY ADENOMA: Chronic | ICD-10-CM

## 2024-07-03 DIAGNOSIS — F41.3 OTHER MIXED ANXIETY DISORDERS: Chronic | ICD-10-CM

## 2024-07-03 DIAGNOSIS — E03.9 HYPOTHYROIDISM, UNSPECIFIED TYPE: Chronic | ICD-10-CM

## 2024-07-03 DIAGNOSIS — Z00.00 ENCOUNTER FOR ANNUAL HEALTH EXAMINATION: Primary | ICD-10-CM

## 2024-07-03 PROBLEM — Z12.11 SCREENING FOR COLON CANCER: Status: RESOLVED | Noted: 2022-08-18 | Resolved: 2024-07-03

## 2024-07-03 PROCEDURE — 99214 OFFICE O/P EST MOD 30 MIN: CPT | Performed by: INTERNAL MEDICINE

## 2024-07-03 PROCEDURE — 99396 PREV VISIT EST AGE 40-64: CPT | Performed by: INTERNAL MEDICINE

## 2024-08-26 DIAGNOSIS — F41.3 OTHER MIXED ANXIETY DISORDERS: Chronic | ICD-10-CM

## 2024-08-26 RX ORDER — CITALOPRAM HYDROBROMIDE 20 MG/1
20 TABLET ORAL DAILY
Qty: 90 TABLET | Refills: 1 | Status: SHIPPED | OUTPATIENT
Start: 2024-08-26

## 2025-01-03 NOTE — TELEPHONE ENCOUNTER
Addended by: YAYA CHRISTIANSEN on: 1/3/2025 04:57 PM     Modules accepted: Level of Service     Caller: Kaia Mack    Relationship to patient: Self    Best call back number:[phone]    Chief complaint:NEEDS TO RESCHEDULE TELEPHONE VISIT    Type of visit:TELEPHONE VISIT    Requested date:SOONER THAN 03/30/23    If rescheduling, when is the original appointment:03/30/23    Additional notes:PT CALLED AND STATES THAT SHE WILL BE FLYING THE DAY OF HER SCHEDULED APPT. PT WOULD LIKE TO SEE IF  HAS ANYTHING ELSE AVAILABLE EARLIER IN THE WEEK PT WOULD LIKE A CALL BACK THANK YOU!

## 2025-02-09 DIAGNOSIS — F41.3 OTHER MIXED ANXIETY DISORDERS: Chronic | ICD-10-CM

## 2025-02-10 RX ORDER — CITALOPRAM HYDROBROMIDE 20 MG/1
20 TABLET ORAL DAILY
Qty: 90 TABLET | Refills: 1 | Status: SHIPPED | OUTPATIENT
Start: 2025-02-10

## 2025-03-25 NOTE — ANESTHESIA POSTPROCEDURE EVALUATION
"Patient: Kaia Mack    Procedure Summary     Date: 09/29/22 [facility] / [facility] /  [facility]    Anesthesia Start: 0806 Anesthesia Stop: 0836    Procedure: COLONOSCOPY to cecum with biopsy and hot snare polypectomy (N/A ) Diagnosis:       Screening for colon cancer      (Screening for colon cancer [Z12.11])    Surgeons: Leticia Elizabeth MD Provider: Cruz Albert MD    Anesthesia Type: MAC ASA Status: 3          Anesthesia Type: MAC    Vitals  Vitals Value Taken Time   BP 97/62 09/29/22 0852   Temp     Pulse 45 09/29/22 0852   Resp 16 09/29/22 0852   SpO2 100 % 09/29/22 0852           Post Anesthesia Care and Evaluation    Patient location during evaluation: bedside  Patient participation: complete - patient participated  Level of consciousness: sleepy but conscious  Pain score: 0  Pain management: adequate    Airway patency: patent  Anesthetic complications: No anesthetic complications    Cardiovascular status: acceptable  Respiratory status: acceptable  Hydration status: acceptable    Comments: BP 97/62 (BP Location: Left arm, Patient Position: Sitting)   Pulse (!) 45   Resp 16   Ht 160 cm (63\")   Wt 51.9 kg (114 lb 8 oz)   SpO2 100%   BMI 20.28 kg/m²         " Pt admitted from ED for alcohol withdrawal. Pt alert and oriented to self, place and situation. Assessment completed. Pt oriented to room and call light. Bed alarm set.

## 2025-07-08 ENCOUNTER — OFFICE VISIT (OUTPATIENT)
Dept: INTERNAL MEDICINE | Age: 50
End: 2025-07-08
Payer: COMMERCIAL

## 2025-07-08 VITALS
HEIGHT: 64 IN | HEART RATE: 60 BPM | SYSTOLIC BLOOD PRESSURE: 92 MMHG | OXYGEN SATURATION: 97 % | BODY MASS INDEX: 20.32 KG/M2 | WEIGHT: 119 LBS | TEMPERATURE: 97.1 F | DIASTOLIC BLOOD PRESSURE: 62 MMHG

## 2025-07-08 DIAGNOSIS — F41.3 OTHER MIXED ANXIETY DISORDERS: Chronic | ICD-10-CM

## 2025-07-08 DIAGNOSIS — Z00.00 ENCOUNTER FOR ANNUAL HEALTH EXAMINATION: ICD-10-CM

## 2025-07-08 DIAGNOSIS — Z00.00 PREVENTATIVE HEALTH CARE: ICD-10-CM

## 2025-07-08 DIAGNOSIS — D35.2 PITUITARY ADENOMA: Chronic | ICD-10-CM

## 2025-07-08 DIAGNOSIS — E03.9 HYPOTHYROIDISM, UNSPECIFIED TYPE: Chronic | ICD-10-CM

## 2025-07-08 RX ORDER — B-COMPLEX WITH VITAMIN C
TABLET ORAL
COMMUNITY

## 2025-07-08 RX ORDER — LEVOTHYROXINE SODIUM 50 UG/1
1 TABLET ORAL DAILY
COMMUNITY
Start: 2025-06-09

## 2025-07-08 RX ORDER — METHYLDOPA/HYDROCHLOROTHIAZIDE 250MG-15MG
TABLET ORAL
COMMUNITY
Start: 2025-07-05

## 2025-07-08 RX ORDER — CHLORAL HYDRATE 500 MG
CAPSULE ORAL
COMMUNITY

## 2025-07-08 NOTE — ASSESSMENT & PLAN NOTE
Taking levothyroxine 50 mcg M-F; no medication on Sat/Sun.     Recent out labs: low FT4 and low TSH

## 2025-07-08 NOTE — PROGRESS NOTES
"         J  U  N  O  H    K  I  M ,   M  D              I  N  T  E  R  N  A  L    M  E  D  I  C  I  N  E         ENCOUNTER DATE:  07/08/2025    Kaia GUTHRIE Frederick / 50 y.o. / female    ANNUAL PREVENTATIVE / PHYSICAL ENCOUNTER       CHIEF COMPLAINT     Annual Exam and Anxiety      VITALS     Vitals:    07/08/25 1414   BP: 92/62   Pulse: 60   Temp: 97.1 °F (36.2 °C)   SpO2: 97%   Weight: 54 kg (119 lb)   Height: 161.3 cm (63.5\")       BP Readings from Last 3 Encounters:   07/08/25 92/62   07/03/24 110/70   01/26/24 120/64     Wt Readings from Last 3 Encounters:   07/08/25 54 kg (119 lb)   07/03/24 55.6 kg (122 lb 9.6 oz)   01/26/24 53.5 kg (118 lb)      Body mass index is 20.75 kg/m².    Blood pressure readings recorded on patient flowsheet:       No data to display                  MEDICATIONS     Current Outpatient Medications on File Prior to Visit   Medication Sig Dispense Refill    cetirizine (ZyrTEC) 10 MG tablet Take 1 tablet by mouth Every Morning.      Cholecalciferol (VITAMIN D) 1000 UNITS tablet Take 1 tablet by mouth Daily. HOLD FOR SURGERY      citalopram (CeleXA) 20 MG tablet Take 1 tablet by mouth once daily 90 tablet 1    Ferrous Sulfate Dried (High Potency Iron) 65 MG tablet Daily.      fluticasone (FLONASE) 50 MCG/ACT nasal spray 1 spray into each nostril 2 (Two) Times a Day. 1 bottle 5    levothyroxine (SYNTHROID, LEVOTHROID) 50 MCG tablet Take 1 tablet by mouth Daily.      MAGNESIUM PO Take 1 tablet by mouth Daily. HOLD FOR SURGERY      Menatetrenone (Vitamin K2) 100 MCG tablet       Omega-3 1000 MG capsule       Vitamin B Complex-C capsule       Zinc Sulfate (ZINC 15 PO) Take 15 mg by mouth Daily. HOLD FOR SURGERY      [DISCONTINUED] levothyroxine (SYNTHROID, LEVOTHROID) 88 MCG tablet Take 1 tablet by mouth Daily. 30 tablet 1     No current facility-administered medications on file prior to visit.         HISTORY OF PRESENT ILLNESS     Kaia presents for annual health maintenance visit.    She is " also here to follow-up on active medical problems requiring laboratory evaluation and/or medical management.      Her father passed away recently from possible VTE with recent hip fracture.   Her brother passed away in September.   Defers grief counseling.     Emotionally seems to be holding up okay taking citalopram 20 mg daily for anxiety.   She exercises very regularly which helps with her mental health.   Sees endocrinologist for pituitary adenoma and hypothyroidism.     Doing fine s/p lumbar spine surgery. Denies significant problems with exercise.     Patient Care Team:  Mason Bonilla MD as PCP - General (Internal Medicine)  Ping Cisneros MD as Consulting Physician (Obstetrics and Gynecology)  Jake Cornelius MD (Endocrinology)  Darrell Olvera MD as Surgeon (Neurosurgery)    General health: some medical problems  Lifestyle:  Attempting to lose weight?: No   Diet: eats a well balanced, healthy diet  Exercise: exercises 6 days weekly  Tobacco: Never used   Alcohol: occasional/infrequent  Work: Not working and currently not looking  Reproductive health:  Sexually active?: Yes   Sexual problems?: No problems  Concern for STD?: No    Sees Gynecologist?: Yes   [place]/Postmenopausal?: Yes   Depression Screening:        PHQ-2 Depression Screening  Little interest or pleasure in doing things? Not at all   Feeling down, depressed, or hopeless? Not at all   PHQ-2 Total Score 0              7/8/2025     2:19 PM   PHQ-2/PHQ-9 Depression Screening   Little interest or pleasure in doing things Not at all   Feeling down, depressed, or hopeless Not at all   How difficult have these problems made it for you to do your work, take care of things at home, or get along with other people? Not difficult at all        PHQ-2: 0 (Not depressed)   PHQ-9: 0 (Negative screening for depression)    ALLERGIES  No Known Allergies     PFSH:     The following portions of the patient's history were reviewed and updated as appropriate:  Allergies / Current Medications / Past Medical History / Surgical History / Social History / Family History    PROBLEM LIST   Patient Active Problem List   Diagnosis    Insomnia    Iron deficiency anemia    Vitamin D deficiency    Hypothyroidism    Pituitary adenoma    Other mixed anxiety disorders    Chronic allergic rhinitis    TMJ (temporomandibular joint disorder)    Other irritable bowel syndrome    Colon polyps    Chronic bilateral low back pain with bilateral sciatica    Spinal stenosis of lumbar region with neurogenic claudication    Lumbar spinal stenosis       PAST MEDICAL HISTORY  Past Medical History:   Diagnosis Date    Acute appendicitis with localized peritonitis 07/21/2017    Anxiety     Asthma     Colon polyp     Depression     History of COVID-19 2020    Hypothyroidism     Kidney stone     Lumbar canal stenosis 08/16/2016    Migraine     RARE    Numbness and tingling of left leg     Pituitary adenoma     benign    PONV (postoperative nausea and vomiting)     Sciatica     Spinal stenosis     Spondylolisthesis of lumbar region 08/16/2016       SURGICAL HISTORY  Past Surgical History:   Procedure Laterality Date    ABDOMINOPLASTY N/A 02/12/2016    Dr. Isidro Nguyen    APPENDECTOMY N/A 7/21/2017    Procedure: APPENDECTOMY LAPAROSCOPIC;  Surgeon: Leticia Elizabeth MD;  Location: [facility] OR;  Service:     BREAST AUGMENTATION Bilateral 02/12/2016    Dr. Isidro Nguyen    COLONOSCOPY N/A 9/29/2022    Procedure: COLONOSCOPY to cecum with biopsy and hot snare polypectomy;  Surgeon: Leticia Elizabeth MD;  Location: [facility] ENDOSCOPY;  Service: General;  Laterality: N/A;  pre- screening  post- polyps    DENTAL EXAMINATION UNDER ANESTHESIA      LUMBAR DISCECTOMY FUSION INSTRUMENTATION N/A 9/26/2016    Procedure: L4-5  fusion with instrumentation;  Surgeon: Isidro Dietz MD;  Location: [facility] OR;  Service:     LUMBAR FUSION N/A 4/26/2023    Procedure: Lumbar 3 to lumbar 4 laminectomy with fusion and  instrumentation using the [employer] robot and removal of previous instrumentation;  Surgeon: Darrell Olvera MD;  Location: [facility] MAIN OR;  Service: Robotics - Neuro;  Laterality: N/A;    LUMBAR LAMINECTOMY DISCECTOMY DECOMPRESSION N/A 2016    Procedure: L4-L5 LUMBAR LAMINECTOMY;  Surgeon: Darrell Olvera MD;  Location: [facility] MAIN OR;  Service:     SCAR REVISION BREAST Bilateral 2016    Procedure: REVISION GABBY BREAST, REMOVE AND REPLACE RIGHT BREAST IMPLANT;  Surgeon: Isidro Nguyen MD;  Location: [facility] MAIN OR;  Service:     WISDOM TOOTH EXTRACTION Bilateral        SOCIAL HISTORY  Social History     Socioeconomic History    Marital status:      Spouse name: Fidel    Number of children: 3    Years of education: Bachelors   Tobacco Use    Smoking status: Never    Smokeless tobacco: Never   Vaping Use    Vaping status: Never Used   Substance and Sexual Activity    Alcohol use: Yes     Alcohol/week: 3.0 - 5.0 standard drinks of alcohol     Types: 3 - 5 Glasses of wine per week    Drug use: No    Sexual activity: Yes     Partners: Male     Comment:  had vasectomy       FAMILY HISTORY  Family History   Problem Relation Age of Onset    Breast cancer Mother 59    Bipolar disorder Mother         type 1    Stroke Mother 74         of stroke    Diabetes type II Mother     Lymphoma Father         CLL    Hypothyroidism Father     Colon polyps Father 50    Heart failure Father     Melanoma Sister     Coronary artery disease Brother 53    Drug abuse Brother     Heart failure Maternal Grandmother     Coronary artery disease Maternal Grandmother     Bipolar disorder Maternal Grandmother     Coronary artery disease Maternal Grandfather     Diabetes Paternal Grandmother     Prostate cancer Paternal Grandfather     Alcohol abuse Maternal Uncle     Drug abuse Maternal Uncle     Graves' disease Paternal Aunt     Abnormal EKG Paternal Aunt     No Known Problems Daughter     No Known Problems Daughter      No Known Problems Son     Colon cancer Neg Hx     Malig Hyperthermia Neg Hx        IMMUNIZATION HISTORY  Immunization History   Administered Date(s) Administered    COVID-19 (MODERNA) 1st,2nd,3rd Dose Monovalent 04/03/2021, 05/01/2021    COVID-19 (PFIZER) BIVALENT 12+YRS 10/29/2022    COVID-19 (PFIZER) Purple Cap Monovalent 12/09/2021    Flu Vaccine Quad PF 6-35MO 10/16/2017, 10/01/2018, 10/17/2019    Fluzone (or Fluarix & Flulaval for VFC) >6mos 10/09/2019, 10/15/2019, 10/16/2019, 09/28/2020, 09/07/2021, 10/29/2022, 10/09/2023    Hepatitis A 03/21/2018, 04/27/2018, 10/31/2018    Hepatitis B 2 Dose Vaccine Heplisav-B 04/22/2019, 05/22/2019    IPV 05/16/2022    Influenza, Unspecified 09/25/2015, 10/29/2022    Tdap 04/22/2019, 01/14/2022    Typhoid Inactivated 04/22/2019, 05/16/2022    Yellow Fever 04/22/2019         REVIEW OF SYSTEMS     Review of Systems   Constitutional: Negative.    HENT: Negative.     Eyes: Negative.    Respiratory: Negative.     Cardiovascular: Negative.    Gastrointestinal: Negative.    Endocrine: Negative.    Genitourinary: Negative.    Musculoskeletal: Negative.    Skin: Negative.    Allergic/Immunologic: Negative.    Neurological: Negative.    Hematological: Negative.    Psychiatric/Behavioral: Negative.           PHYSICAL EXAMINATION     Physical Exam  Constitutional:       General: She is not in acute distress.     Appearance: Normal appearance. She is well-developed.      Comments: Appears very healthy    HENT:      Head: Normocephalic and atraumatic.      Right Ear: Tympanic membrane, ear canal and external ear normal.      Left Ear: Tympanic membrane, ear canal and external ear normal.      Mouth/Throat:      Mouth: Mucous membranes are moist.      Pharynx: Oropharynx is clear.   Eyes:      General: No scleral icterus.     Conjunctiva/sclera: Conjunctivae normal.      Pupils: Pupils are equal, round, and reactive to light.   Neck:      Thyroid: No thyroid mass or thyromegaly.       Vascular: No carotid bruit.      Trachea: No tracheal deviation.   Cardiovascular:      Rate and Rhythm: Normal rate and regular rhythm.      Pulses: Normal pulses.      Heart sounds: Normal heart sounds.   Pulmonary:      Effort: Pulmonary effort is normal.      Breath sounds: Normal breath sounds.   Abdominal:      General: There is no distension.      Palpations: Abdomen is soft. There is no mass.      Tenderness: There is no abdominal tenderness.      Hernia: No hernia is present.   Genitourinary:     Comments: Deferred to gyne/by patient unless specified otherwise.   Musculoskeletal:      Cervical back: Neck supple.      Right lower leg: No edema.      Left lower leg: No edema.   Lymphadenopathy:      Cervical: No cervical adenopathy.   Skin:     General: Skin is warm.      Coloration: Skin is not jaundiced or pale.      Findings: No rash.   Neurological:      General: No focal deficit present.      Mental Status: She is alert and oriented to person, place, and time.      Cranial Nerves: No cranial nerve deficit.      Motor: No abnormal muscle tone.      Coordination: Coordination normal.   Psychiatric:         Mood and Affect: Mood normal.         Behavior: Behavior normal.         Thought Content: Thought content normal.         Judgment: Judgment normal.         REVIEWED DATA      Labs:    Lab Results   Component Value Date     01/26/2024    K 4.4 01/26/2024    CALCIUM 9.0 01/26/2024    AST 16 01/26/2024    ALT 18 01/26/2024    BUN 15 01/26/2024    CREATININE 0.84 01/26/2024    CREATININE 0.79 04/19/2023    CREATININE 0.73 08/12/2021    EGFR 86.9 01/26/2024     Lab Results   Component Value Date    GLUCOSE 91 01/26/2024    HGBA1C 4.80 06/16/2021    TSH 0.160 (L) 10/09/2023    FREET4 1.16 12/28/2023     Lab Results   Component Value Date    LDL 64 06/16/2021    HDL 80 (H) 06/16/2021    TRIG 108 06/16/2021    CHOLHDLRATIO 2.04 06/16/2021     Lab Results   Component Value Date    WBC 3.65 01/26/2024     HGB 13.6 01/26/2024    MCV 97.8 (H) 01/26/2024     01/26/2024     Lab Results   Component Value Date    PROTEIN Negative 06/16/2021    GLUCOSEU Negative 06/16/2021    BLOODU Negative 06/16/2021    NITRITEU Negative 06/16/2021    LEUKOCYTESUR See below: (A) 06/16/2021     Lab Results   Component Value Date    HEPCVIRUSABY <0.1 06/16/2021     Lab Results   Component Value Date    ZQZB72EI 58 09/28/2020    ARXU49XP >60 04/17/2019    OZEY82KM 46 04/16/2018        Imaging:                Medical Tests:              Summary of old records / correspondence / consultant report:               Request outside records:         ASSESSMENT & PLAN     ANNUAL WELLNESS EXAM / PHYSICAL     Other medical problems addressed today:  Problem List Items Addressed This Visit          High    Other mixed anxiety disorders (Chronic)    Overview   Continue citalopram 20 mg daily         Current Assessment & Plan   Father recently passed (unexpected).   Brother passed in 9/2024.   Doing okay overall.   Continue citalopram 20 mg daily.   Grief counseling if needed.          Relevant Medications    citalopram (CeleXA) 20 MG tablet       Medium    Hypothyroidism (Chronic)    Overview   *Endocrine         Current Assessment & Plan   Taking levothyroxine 50 mcg M-F; no medication on Sat/Sun.     Recent out labs: low FT4 and low TSH         Relevant Medications    levothyroxine (SYNTHROID, LEVOTHROID) 50 MCG tablet       Low    Pituitary adenoma (Chronic)    Overview   *Sees endocrinologist          Other Visit Diagnoses         Encounter for annual health examination        Relevant Orders    Comprehensive Metabolic Panel    Lipid Panel With / Chol / HDL Ratio    Hemoglobin A1c    CBC & Differential    Urinalysis With Microscopic If Indicated (No Culture) - Urine, Clean Catch      Preventative health care        Relevant Orders    CBC & Differential    Comprehensive Metabolic Panel    Hemoglobin A1c    Lipid Panel With / Chol / HDL Ratio     TSH+Free T4    Urinalysis With Microscopic If Indicated (No Culture) - Urine, Clean Catch            Orders Placed This Encounter   Procedures    Comprehensive Metabolic Panel     Release to patient:   Routine Release [[id number]]    Lipid Panel With / Chol / HDL Ratio     Release to patient:   Routine Release [[id number]]    Hemoglobin A1c     Release to patient:   Routine Release [[id number]]    Urinalysis With Microscopic If Indicated (No Culture) - Urine, Clean Catch     Release to patient:   Routine Release [[id number]]    Comprehensive Metabolic Panel     Standing Status:   Future     Expected Date:   5/4/2026     Expiration Date:   11/20/2026     Release to patient:   Routine Release [[id number]]    Hemoglobin A1c     Standing Status:   Future     Expected Date:   5/4/2026     Expiration Date:   11/20/2026     Release to patient:   Routine Release [[id number]]    Lipid Panel With / Chol / HDL Ratio     Standing Status:   Future     Expected Date:   5/4/2026     Expiration Date:   11/20/2026     Release to patient:   Routine Release [[id number]]    TSH+Free T4     Standing Status:   Future     Expected Date:   5/4/2026     Expiration Date:   11/20/2026     Release to patient:   Routine Release [[id number]]    Urinalysis With Microscopic If Indicated (No Culture) - Urine, Clean Catch     Standing Status:   Future     Expected Date:   5/4/2026     Expiration Date:   11/20/2026     Release to patient:   Routine Release [[id number]]    CBC & Differential     Manual Differential:   No     Release to patient:   Routine Release [[id number]]    CBC & Differential     Standing Status:   Future     Expected Date:   5/4/2026     Expiration Date:   11/20/2026     Manual Differential:   No     Release to patient:   Routine Release [[id number]]        Summary/Discussion:         Next Appointment with me: Visit date not found    Return in about 1 year (around 7/8/2026) for SCHEDULE COMBINED ANNUAL PHYSICAL & MEDICAL  ALEXX.      HEALTHCARE MAINTENANCE ISSUES     Health Maintenance   Topic Date Due    COVID-19 Vaccine (5 - 2024-25 season) 09/01/2024    ANNUAL PHYSICAL  07/03/2025    COLORECTAL CANCER SCREENING  09/29/2025    INFLUENZA VACCINE  10/01/2025    PAP SMEAR  01/25/2027    MAMMOGRAM  03/21/2027    TDAP/TD VACCINES (3 - Td or Tdap) 01/14/2032    HEPATITIS C SCREENING  Completed    Pneumococcal Vaccine 0-49  Aged Out       Cancer Screening:  Colon: Initial/Next screening at age: CURRENT and -Colonoscopy  Repeat colon cancer screening: every 3 years  Breast: Recommended monthly self exams; annual professional exam  Mammogram: every 1 year  Cervical: 3 years  Lung: Does not meet criteria for lung cancer screening.   Skin: Monthly self skin examination, annual exam by health professional  Other:    Screening Labs & Tests:  CV Screening: Lipid panel  DEXA (65+ or postmenopausal with risk factors): N/A  Other:     Immunization/Vaccinations:    Immunization History   Administered Date(s) Administered    COVID-19 (MODERNA) 1st,2nd,3rd Dose Monovalent 04/03/2021, 05/01/2021    COVID-19 (PFIZER) BIVALENT 12+YRS 10/29/2022    COVID-19 (PFIZER) Purple Cap Monovalent 12/09/2021    Flu Vaccine Quad PF 6-35MO 10/16/2017, 10/01/2018, 10/17/2019    Fluzone (or Fluarix & Flulaval for VFC) >6mos 10/09/2019, 10/15/2019, 10/16/2019, 09/28/2020, 09/07/2021, 10/29/2022, 10/09/2023    Hepatitis A 03/21/2018, 04/27/2018, 10/31/2018    Hepatitis B 2 Dose Vaccine Heplisav-B 04/22/2019, 05/22/2019    IPV 05/16/2022    Influenza, Unspecified 09/25/2015, 10/29/2022    Tdap 04/22/2019, 01/14/2022    Typhoid Inactivated 04/22/2019, 05/16/2022    Yellow Fever 04/22/2019       Influenza: Recommended annual influenza vaccine  Hepatitis A: Up to date  Hepatitis B: Up to date  Tetanus/Pertussis: Up to date  Pneumococcal: Not needed at this time  Shingles: Not needed at this time  COVID: Does not plan to get the latest booster  RSV: Not indicated  HPV:  N/A    Lifestyle Counseling:  Lifestyle Modifications: Continue good lifestyle choices/modifications and Follow a low fat, low cholesterol diet  Safety Issues: Always wear seatbelt, Avoid texting while driving   Use sunscreen, regular skin examination  Recommended annual dental/vision examination.  Emotional/Stress/Sleep: Reviewed and  given when appropriate

## 2025-07-09 LAB
ALBUMIN SERPL-MCNC: 4.3 G/DL (ref 3.5–5.2)
ALBUMIN/GLOB SERPL: 2.3 G/DL
ALP SERPL-CCNC: 67 U/L (ref 39–117)
ALT SERPL-CCNC: 14 U/L (ref 1–33)
APPEARANCE UR: ABNORMAL
AST SERPL-CCNC: 18 U/L (ref 1–32)
BASOPHILS # BLD AUTO: 0.04 10*3/MM3 (ref 0–0.2)
BASOPHILS NFR BLD AUTO: 0.8 % (ref 0–1.5)
BILIRUB SERPL-MCNC: 0.3 MG/DL (ref 0–1.2)
BILIRUB UR QL STRIP: NEGATIVE
BUN SERPL-MCNC: 23 MG/DL (ref 6–20)
BUN/CREAT SERPL: 27.7 (ref 7–25)
CALCIUM SERPL-MCNC: 9.2 MG/DL (ref 8.6–10.5)
CHLORIDE SERPL-SCNC: 103 MMOL/L (ref 98–107)
CHOLEST SERPL-MCNC: 191 MG/DL (ref 0–200)
CHOLEST/HDLC SERPL: 2.3 {RATIO}
CO2 SERPL-SCNC: 28.9 MMOL/L (ref 22–29)
COLOR UR: YELLOW
CREAT SERPL-MCNC: 0.83 MG/DL (ref 0.57–1)
EGFRCR SERPLBLD CKD-EPI 2021: 87.1 ML/MIN/1.73
EOSINOPHIL # BLD AUTO: 0.09 10*3/MM3 (ref 0–0.4)
EOSINOPHIL NFR BLD AUTO: 1.8 % (ref 0.3–6.2)
ERYTHROCYTE [DISTWIDTH] IN BLOOD BY AUTOMATED COUNT: 11.5 % (ref 12.3–15.4)
GLOBULIN SER CALC-MCNC: 1.9 GM/DL
GLUCOSE SERPL-MCNC: 87 MG/DL (ref 65–99)
GLUCOSE UR QL STRIP: NEGATIVE
HBA1C MFR BLD: 5.1 % (ref 4.8–5.6)
HCT VFR BLD AUTO: 39 % (ref 34–46.6)
HDLC SERPL-MCNC: 83 MG/DL (ref 40–60)
HGB BLD-MCNC: 13.4 G/DL (ref 12–15.9)
HGB UR QL STRIP: NEGATIVE
IMM GRANULOCYTES # BLD AUTO: 0.01 10*3/MM3 (ref 0–0.05)
IMM GRANULOCYTES NFR BLD AUTO: 0.2 % (ref 0–0.5)
KETONES UR QL STRIP: NEGATIVE
LDLC SERPL CALC-MCNC: 92 MG/DL (ref 0–100)
LEUKOCYTE ESTERASE UR QL STRIP: NEGATIVE
LYMPHOCYTES # BLD AUTO: 1.76 10*3/MM3 (ref 0.7–3.1)
LYMPHOCYTES NFR BLD AUTO: 35.6 % (ref 19.6–45.3)
MCH RBC QN AUTO: 33.1 PG (ref 26.6–33)
MCHC RBC AUTO-ENTMCNC: 34.4 G/DL (ref 31.5–35.7)
MCV RBC AUTO: 96.3 FL (ref 79–97)
MONOCYTES # BLD AUTO: 0.41 10*3/MM3 (ref 0.1–0.9)
MONOCYTES NFR BLD AUTO: 8.3 % (ref 5–12)
NEUTROPHILS # BLD AUTO: 2.63 10*3/MM3 (ref 1.7–7)
NEUTROPHILS NFR BLD AUTO: 53.3 % (ref 42.7–76)
NITRITE UR QL STRIP: NEGATIVE
NRBC BLD AUTO-RTO: 0 /100 WBC (ref 0–0.2)
PH UR STRIP: 7 [PH] (ref 5–8)
PLATELET # BLD AUTO: 222 10*3/MM3 (ref 140–450)
POTASSIUM SERPL-SCNC: 4.4 MMOL/L (ref 3.5–5.2)
PROT SERPL-MCNC: 6.2 G/DL (ref 6–8.5)
PROT UR QL STRIP: NEGATIVE
RBC # BLD AUTO: 4.05 10*6/MM3 (ref 3.77–5.28)
SODIUM SERPL-SCNC: 141 MMOL/L (ref 136–145)
SP GR UR STRIP: 1.02 (ref 1–1.03)
TRIGL SERPL-MCNC: 93 MG/DL (ref 0–150)
UROBILINOGEN UR STRIP-MCNC: ABNORMAL MG/DL
VLDLC SERPL CALC-MCNC: 16 MG/DL (ref 5–40)
WBC # BLD AUTO: 4.94 10*3/MM3 (ref 3.4–10.8)

## 2025-08-02 DIAGNOSIS — F41.3 OTHER MIXED ANXIETY DISORDERS: Chronic | ICD-10-CM

## 2025-08-04 RX ORDER — CITALOPRAM HYDROBROMIDE 20 MG/1
20 TABLET ORAL DAILY
Qty: 90 TABLET | Refills: 1 | Status: SHIPPED | OUTPATIENT
Start: 2025-08-04

## (undated) DEVICE — VISUALIZATION SYSTEM: Brand: CLEARIFY

## (undated) DEVICE — DRP C/ARM 41X74IN

## (undated) DEVICE — NEEDLE, QUINCKE, 20GX3.5": Brand: MEDLINE

## (undated) DEVICE — GLV SURG BIOGEL LTX PF 6

## (undated) DEVICE — TUBING, SUCTION, 1/4" X 10', STRAIGHT: Brand: MEDLINE

## (undated) DEVICE — Device

## (undated) DEVICE — ENDOPATH ETS-FLEX45 ARTICULATING ENDOSCOPIC LINEAR CUTTER, NO RELOAD: Brand: ENDOPATH

## (undated) DEVICE — APPL CHLORAPREP HI/LITE 26ML ORNG

## (undated) DEVICE — ENDOCUT SCISSOR TIP, DISPOSABLE: Brand: RENEW

## (undated) DEVICE — APPL CHLORAPREP W/TINT 26ML ORNG

## (undated) DEVICE — ANTIBACTERIAL UNDYED BRAIDED (POLYGLACTIN 910), SYNTHETIC ABSORBABLE SUTURE: Brand: COATED VICRYL

## (undated) DEVICE — THE SINGLE USE ETRAP – POLYP TRAP IS USED FOR SUCTION RETRIEVAL OF ENDOSCOPICALLY REMOVED POLYPS.: Brand: ETRAP

## (undated) DEVICE — GLV SURG SENSICARE W/ALOE PF LF 7.5 STRL

## (undated) DEVICE — ADAPT CLN BIOGUARD AIR/H2O DISP

## (undated) DEVICE — ADHS SKIN DERMABOND TOP ADVANCED

## (undated) DEVICE — GLV SURG BIOGEL LTX PF 7

## (undated) DEVICE — SYR LUERLOK 30CC

## (undated) DEVICE — 3M™ STERI-STRIP™ ANTIMICROBIAL SKIN CLOSURES 1/2 INCH X 4 INCHES 50/CARTON 4 CARTONS/CASE A1847: Brand: 3M™ STERI-STRIP™

## (undated) DEVICE — DRP MICROSCOPE 4 BINOCULAR CV 54X150IN

## (undated) DEVICE — GLV SURG SENSICARE GREEN W/ALOE PF LF 6.5 STRL

## (undated) DEVICE — GOWN,SIRUS,NON REINFRCD,LARGE,SET IN SL: Brand: MEDLINE

## (undated) DEVICE — TRAP FLD MINIVAC MEGADYNE 100ML

## (undated) DEVICE — SUT PROLN SURGILENE 5.0 24IN BLU 9702H

## (undated) DEVICE — GOWN ,SIRUS,NONREINFORCED SMALL: Brand: MEDLINE

## (undated) DEVICE — ADHS LIQ MASTISOL 2/3ML

## (undated) DEVICE — BG TRANSF W/COUPLER SPK 600ML

## (undated) DEVICE — PATIENT RETURN ELECTRODE, SINGLE-USE, CONTACT QUALITY MONITORING, ADULT, WITH 9FT CORD, FOR PATIENTS WEIGING OVER 33LBS. (15KG): Brand: MEGADYNE

## (undated) DEVICE — SENSR O2 OXIMAX FNGR A/ 18IN NONSTR

## (undated) DEVICE — SMOKE EVACUATION TUBING WITH 7/8 IN TO 1/4 IN REDUCER: Brand: BUFFALO FILTER

## (undated) DEVICE — INTENDED FOR TISSUE SEPARATION, AND OTHER PROCEDURES THAT REQUIRE A SHARP SURGICAL BLADE TO PUNCTURE OR CUT.: Brand: BARD-PARKER ® STAINLESS STEEL BLADES

## (undated) DEVICE — PK NEURO SPINE 40

## (undated) DEVICE — LOU LAP CHOLE: Brand: MEDLINE INDUSTRIES, INC.

## (undated) DEVICE — SUT VIC 0 UR6 27IN VCP603H

## (undated) DEVICE — PK ATS CUST W CARDIOTOMY RESEVOIR

## (undated) DEVICE — NDL BIOP BONE MARRW JAMSHIDI 11G 101MM

## (undated) DEVICE — SPHR MARKR STEALTH STATION

## (undated) DEVICE — ENDOPATH XCEL UNIVERSAL TROCAR STABLILITY SLEEVES: Brand: ENDOPATH XCEL

## (undated) DEVICE — KT ORCA ORCAPOD DISP STRL

## (undated) DEVICE — PENCL ES MEGADINE EZ/CLEAN BUTN W/HOLSTR 10FT

## (undated) DEVICE — TOOL MR8-31TD3030 MR8 TWST DRIL 3MMX30MM: Brand: MIDAS REX

## (undated) DEVICE — TOOL MR8-15BA50T MR8 15CM BAL SYMTRI 5MM: Brand: MIDAS REX MR8

## (undated) DEVICE — CANN O2 ETCO2 FITS ALL CONN CO2 SMPL A/ 7IN DISP LF

## (undated) DEVICE — SNAR POLYP CAPTIVATOR2 RND STFF 2.4 25MM 240CM

## (undated) DEVICE — DRAPE,REIN 53X77,STERILE: Brand: MEDLINE

## (undated) DEVICE — TOTAL TRAY, 16FR 10ML SIL FOLEY, URN: Brand: MEDLINE

## (undated) DEVICE — SYR CONTRL PRESS/LO FIX/M/LL W/THMB/RNG 10ML

## (undated) DEVICE — SPONGE,NEURO,.75"X.75",XR,STRL,LF,10/PK: Brand: MEDLINE

## (undated) DEVICE — KT SPINE MAZORX DISP

## (undated) DEVICE — DISPOSABLE IRRIGATION BIPOLAR CORD, M1000 TYPE: Brand: KIRWAN

## (undated) DEVICE — SINGLE-USE BIOPSY FORCEPS: Brand: RADIAL JAW 4

## (undated) DEVICE — ENDOPATH XCEL BLADELESS TROCARS WITH STABILITY SLEEVES: Brand: ENDOPATH XCEL

## (undated) DEVICE — CONN TBG Y 5 IN 1 LF STRL

## (undated) DEVICE — UNDYED BRAIDED (POLYGLACTIN 910), SYNTHETIC ABSORBABLE SUTURE: Brand: COATED VICRYL

## (undated) DEVICE — SPONGE,LAP,12"X12",XR,ST,5/PK,40PK/CS: Brand: MEDLINE

## (undated) DEVICE — ENDOPOUCH RETRIEVER SPECIMEN RETRIEVAL BAGS: Brand: ENDOPOUCH RETRIEVER

## (undated) DEVICE — TBG PENCL TELESCP MEGADYNE SMOKE EVAC 10FT

## (undated) DEVICE — ENDOPATH XCEL BLUNT TIP TROCARS WITH SMOOTH SLEEVES: Brand: ENDOPATH XCEL

## (undated) DEVICE — TOOL MR8-15MH30 MR8 15CM MATCH 3MM: Brand: MIDAS REX MR8